# Patient Record
Sex: FEMALE | Race: WHITE | NOT HISPANIC OR LATINO | Employment: OTHER | URBAN - METROPOLITAN AREA
[De-identification: names, ages, dates, MRNs, and addresses within clinical notes are randomized per-mention and may not be internally consistent; named-entity substitution may affect disease eponyms.]

---

## 2017-01-17 ENCOUNTER — ALLSCRIPTS OFFICE VISIT (OUTPATIENT)
Dept: OTHER | Facility: OTHER | Age: 62
End: 2017-01-17

## 2017-01-17 DIAGNOSIS — R73.01 IMPAIRED FASTING GLUCOSE: ICD-10-CM

## 2017-01-17 DIAGNOSIS — E78.5 HYPERLIPIDEMIA: ICD-10-CM

## 2017-01-17 DIAGNOSIS — E03.9 HYPOTHYROIDISM: ICD-10-CM

## 2017-01-17 DIAGNOSIS — R92.8 OTHER ABNORMAL AND INCONCLUSIVE FINDINGS ON DIAGNOSTIC IMAGING OF BREAST: ICD-10-CM

## 2017-01-20 ENCOUNTER — GENERIC CONVERSION - ENCOUNTER (OUTPATIENT)
Dept: OTHER | Facility: OTHER | Age: 62
End: 2017-01-20

## 2017-01-20 LAB
A/G RATIO (HISTORICAL): 2 (ref 1.1–2.5)
ALBUMIN SERPL BCP-MCNC: 4.5 G/DL (ref 3.6–4.8)
ALP SERPL-CCNC: 70 IU/L (ref 39–117)
ALT SERPL W P-5'-P-CCNC: 32 IU/L (ref 0–32)
AMBIG ABBREV CMP14 DEFAULT (HISTORICAL): NORMAL
AMBIG ABBREV LP DEFAULT (HISTORICAL): NORMAL
AST SERPL W P-5'-P-CCNC: 38 IU/L (ref 0–40)
BASOPHILS # BLD AUTO: 0 X10E3/UL (ref 0–0.2)
BASOPHILS # BLD AUTO: 1 %
BILIRUB SERPL-MCNC: 0.5 MG/DL (ref 0–1.2)
BUN SERPL-MCNC: 19 MG/DL (ref 8–27)
BUN/CREA RATIO (HISTORICAL): 23 (ref 11–26)
CALCIUM SERPL-MCNC: 9.6 MG/DL (ref 8.7–10.3)
CHLORIDE SERPL-SCNC: 101 MMOL/L (ref 96–106)
CHOLEST SERPL-MCNC: 174 MG/DL (ref 100–199)
CO2 SERPL-SCNC: 21 MMOL/L (ref 18–29)
CREAT SERPL-MCNC: 0.83 MG/DL (ref 0.57–1)
DEPRECATED RDW RBC AUTO: 13.4 % (ref 12.3–15.4)
EGFR AFRICAN AMERICAN (HISTORICAL): 89 ML/MIN/1.73
EGFR-AMERICAN CALC (HISTORICAL): 77 ML/MIN/1.73
EOSINOPHIL # BLD AUTO: 0.3 X10E3/UL (ref 0–0.4)
EOSINOPHIL # BLD AUTO: 4 %
GLUCOSE SERPL-MCNC: 111 MG/DL (ref 65–99)
HBA1C MFR BLD HPLC: 5.4 % (ref 4.8–5.6)
HCT VFR BLD AUTO: 39.1 % (ref 34–46.6)
HDLC SERPL-MCNC: 67 MG/DL
HGB BLD-MCNC: 13 G/DL (ref 11.1–15.9)
IMM.GRANULOCYTES (CD4/8) (HISTORICAL): 0 %
IMM.GRANULOCYTES (CD4/8) (HISTORICAL): 0 X10E3/UL (ref 0–0.1)
LDLC SERPL CALC-MCNC: 73 MG/DL (ref 0–99)
LYMPHOCYTES # BLD AUTO: 2.2 X10E3/UL (ref 0.7–3.1)
LYMPHOCYTES # BLD AUTO: 35 %
MCH RBC QN AUTO: 31.9 PG (ref 26.6–33)
MCHC RBC AUTO-ENTMCNC: 33.2 G/DL (ref 31.5–35.7)
MCV RBC AUTO: 96 FL (ref 79–97)
MONOCYTES # BLD AUTO: 0.5 X10E3/UL (ref 0.1–0.9)
MONOCYTES (HISTORICAL): 8 %
NEUTROPHILS # BLD AUTO: 3.3 X10E3/UL (ref 1.4–7)
NEUTROPHILS # BLD AUTO: 52 %
PLATELET # BLD AUTO: 168 X10E3/UL (ref 150–379)
POTASSIUM SERPL-SCNC: 4.5 MMOL/L (ref 3.5–5.2)
RBC (HISTORICAL): 4.08 X10E6/UL (ref 3.77–5.28)
SODIUM SERPL-SCNC: 142 MMOL/L (ref 134–144)
TOT. GLOBULIN, SERUM (HISTORICAL): 2.3 G/DL (ref 1.5–4.5)
TOTAL PROTEIN (HISTORICAL): 6.8 G/DL (ref 6–8.5)
TRIGL SERPL-MCNC: 168 MG/DL (ref 0–149)
VLDLC SERPL CALC-MCNC: 34 MG/DL (ref 5–40)
WBC # BLD AUTO: 6.3 X10E3/UL (ref 3.4–10.8)

## 2017-01-21 LAB
T4 FREE SERPL-MCNC: 1.26 NG/DL (ref 0.82–1.77)
TSH SERPL DL<=0.05 MIU/L-ACNC: 1.13 UIU/ML (ref 0.45–4.5)

## 2017-01-25 ENCOUNTER — GENERIC CONVERSION - ENCOUNTER (OUTPATIENT)
Dept: OTHER | Facility: OTHER | Age: 62
End: 2017-01-25

## 2017-02-16 ENCOUNTER — APPOINTMENT (EMERGENCY)
Dept: CT IMAGING | Facility: HOSPITAL | Age: 62
End: 2017-02-16
Payer: COMMERCIAL

## 2017-02-16 ENCOUNTER — HOSPITAL ENCOUNTER (EMERGENCY)
Facility: HOSPITAL | Age: 62
Discharge: HOME | End: 2017-02-16
Attending: EMERGENCY MEDICINE | Admitting: EMERGENCY MEDICINE
Payer: COMMERCIAL

## 2017-02-16 VITALS
OXYGEN SATURATION: 98 % | SYSTOLIC BLOOD PRESSURE: 126 MMHG | WEIGHT: 210.2 LBS | TEMPERATURE: 98.5 F | BODY MASS INDEX: 33.93 KG/M2 | DIASTOLIC BLOOD PRESSURE: 86 MMHG | HEART RATE: 83 BPM | RESPIRATION RATE: 18 BRPM

## 2017-02-16 DIAGNOSIS — S06.0X0A CONCUSSION, WITHOUT LOSS OF CONSCIOUSNESS, INITIAL ENCOUNTER: Primary | ICD-10-CM

## 2017-02-16 LAB
ALBUMIN SERPL BCP-MCNC: 4.5 G/DL (ref 3.5–5)
ALP SERPL-CCNC: 81 U/L (ref 46–116)
ALT SERPL W P-5'-P-CCNC: 41 U/L (ref 12–78)
ANION GAP SERPL CALCULATED.3IONS-SCNC: 11 MMOL/L (ref 4–13)
AST SERPL W P-5'-P-CCNC: 27 U/L (ref 5–45)
BASOPHILS # BLD AUTO: 0.04 THOUSANDS/ΜL (ref 0–0.1)
BASOPHILS NFR BLD AUTO: 1 % (ref 0–1)
BILIRUB SERPL-MCNC: 0.8 MG/DL (ref 0.2–1)
BUN SERPL-MCNC: 13 MG/DL (ref 5–25)
CALCIUM SERPL-MCNC: 9.6 MG/DL (ref 8.3–10.1)
CHLORIDE SERPL-SCNC: 104 MMOL/L (ref 100–108)
CO2 SERPL-SCNC: 26 MMOL/L (ref 21–32)
CREAT SERPL-MCNC: 0.88 MG/DL (ref 0.6–1.3)
EOSINOPHIL # BLD AUTO: 0.12 THOUSAND/ΜL (ref 0–0.61)
EOSINOPHIL NFR BLD AUTO: 1 % (ref 0–6)
ERYTHROCYTE [DISTWIDTH] IN BLOOD BY AUTOMATED COUNT: 12.7 % (ref 11.6–15.1)
GFR SERPL CREATININE-BSD FRML MDRD: >60 ML/MIN/1.73SQ M
GLUCOSE SERPL-MCNC: 116 MG/DL (ref 65–140)
HCT VFR BLD AUTO: 42.4 % (ref 34.8–46.1)
HGB BLD-MCNC: 13.6 G/DL (ref 11.5–15.4)
LYMPHOCYTES # BLD AUTO: 1.7 THOUSANDS/ΜL (ref 0.6–4.47)
LYMPHOCYTES NFR BLD AUTO: 20 % (ref 14–44)
MCH RBC QN AUTO: 31.7 PG (ref 26.8–34.3)
MCHC RBC AUTO-ENTMCNC: 32.1 G/DL (ref 31.4–37.4)
MCV RBC AUTO: 99 FL (ref 82–98)
MONOCYTES # BLD AUTO: 0.68 THOUSAND/ΜL (ref 0.17–1.22)
MONOCYTES NFR BLD AUTO: 8 % (ref 4–12)
NEUTROPHILS # BLD AUTO: 6.11 THOUSANDS/ΜL (ref 1.85–7.62)
NEUTS SEG NFR BLD AUTO: 70 % (ref 43–75)
PLATELET # BLD AUTO: 165 THOUSANDS/UL (ref 149–390)
PMV BLD AUTO: 13.3 FL (ref 8.9–12.7)
POTASSIUM SERPL-SCNC: 4.1 MMOL/L (ref 3.5–5.3)
PROT SERPL-MCNC: 7.9 G/DL (ref 6.4–8.2)
RBC # BLD AUTO: 4.29 MILLION/UL (ref 3.81–5.12)
SODIUM SERPL-SCNC: 141 MMOL/L (ref 136–145)
TROPONIN I SERPL-MCNC: <0.02 NG/ML
WBC # BLD AUTO: 8.65 THOUSAND/UL (ref 4.31–10.16)

## 2017-02-16 PROCEDURE — 36415 COLL VENOUS BLD VENIPUNCTURE: CPT | Performed by: EMERGENCY MEDICINE

## 2017-02-16 PROCEDURE — 80053 COMPREHEN METABOLIC PANEL: CPT | Performed by: EMERGENCY MEDICINE

## 2017-02-16 PROCEDURE — 93005 ELECTROCARDIOGRAM TRACING: CPT | Performed by: EMERGENCY MEDICINE

## 2017-02-16 PROCEDURE — 99284 EMERGENCY DEPT VISIT MOD MDM: CPT

## 2017-02-16 PROCEDURE — 85025 COMPLETE CBC W/AUTO DIFF WBC: CPT | Performed by: EMERGENCY MEDICINE

## 2017-02-16 PROCEDURE — 70450 CT HEAD/BRAIN W/O DYE: CPT

## 2017-02-16 PROCEDURE — 84484 ASSAY OF TROPONIN QUANT: CPT | Performed by: EMERGENCY MEDICINE

## 2017-02-17 LAB
ATRIAL RATE: 83 BPM
P AXIS: 42 DEGREES
PR INTERVAL: 142 MS
QRS AXIS: 2 DEGREES
QRSD INTERVAL: 78 MS
QT INTERVAL: 368 MS
QTC INTERVAL: 432 MS
T WAVE AXIS: 29 DEGREES
VENTRICULAR RATE: 83 BPM

## 2017-07-05 DIAGNOSIS — Z91.018 ALLERGY TO OTHER FOODS: ICD-10-CM

## 2017-07-06 ENCOUNTER — GENERIC CONVERSION - ENCOUNTER (OUTPATIENT)
Dept: OTHER | Facility: OTHER | Age: 62
End: 2017-07-06

## 2017-07-08 LAB
ALLERGEN CLAMS (HISTORICAL): <0.1 KU/L
ALLERGEN CORN (HISTORICAL): <0.1 KU/L
ALLERGEN SCALLOP (F338) IGE (HISTORICAL): <0.1 KU/L
CLASS (HISTORICAL): ABNORMAL
EGG WHITE (HISTORICAL): <0.1 KU/L
FISH COD (HISTORICAL): <0.1 KU/L
MILK, COW'S (HISTORICAL): <0.1 KU/L
PEANUT (HISTORICAL): <0.1 KU/L
SESAME SEED IGE (HISTORICAL): <0.1 KU/L
SHRIMP (HISTORICAL): 0.26 KU/L
SOYBEAN (HISTORICAL): <0.1 KU/L
WALNUT (HISTORICAL): <0.1 KU/L
WHEAT (HISTORICAL): <0.1 KU/L

## 2017-07-11 ENCOUNTER — GENERIC CONVERSION - ENCOUNTER (OUTPATIENT)
Dept: OTHER | Facility: OTHER | Age: 62
End: 2017-07-11

## 2017-09-14 ENCOUNTER — GENERIC CONVERSION - ENCOUNTER (OUTPATIENT)
Dept: OTHER | Facility: OTHER | Age: 62
End: 2017-09-14

## 2017-10-16 ENCOUNTER — GENERIC CONVERSION - ENCOUNTER (OUTPATIENT)
Dept: OTHER | Facility: OTHER | Age: 62
End: 2017-10-16

## 2017-11-27 ENCOUNTER — GENERIC CONVERSION - ENCOUNTER (OUTPATIENT)
Dept: FAMILY MEDICINE CLINIC | Facility: CLINIC | Age: 62
End: 2017-11-27

## 2018-01-10 NOTE — PROGRESS NOTES
Assessment    1  Encounter for preventive health examination (V70 0) (Z00 00)   2  History of squamous cell carcinoma of skin (V10 83) (Z85 828)    Plan  Abnormal mammogram    · MAMMO DIAGNOSTIC RIGHT W 3D & CAD; Status:Active; Requested IIZ:50TXM2121;    · US BREAST RIGHT COMPLETE (ABUS); Status:Hold For - Scheduling; Requested  EAT:86ZIL0672;   Hyperlipidemia, Hypothyroidism, Impaired fasting glucose    · (1) CBC/PLT/DIFF; Status:Active; Requested TSF:[id number];    · (1) COMPREHENSIVE METABOLIC PANEL; Status:Active; Requested [id number];    · (1) HEMOGLOBIN A1C; Status:Active; Requested OOE:50BTL2985;    · (1) LIPID PANEL, FASTING; Status:Active; Requested WAV:59HIY1948;    · (1) TSH WITH FT4 REFLEX; Status:Active; Requested JWB:23BXQ6568;     Discussion/Summary  Currently, she eats an adequate diet and has an adequate exercise regimen  the risks and benefits of cervical cancer screening were discussed Breast cancer screening: mammogram is current  Colorectal cancer screening: colorectal cancer screening is current  Osteoporosis screening: the risks and benefits of osteoporosis screening were discussed and the patient declines bone mineral density testing  Screening lab work includes hemoglobin, glucose, lipid profile and thyroid function testing  The immunizations are up to date  Advice and education were given regarding nutrition, aerobic exercise, weight loss, calcium supplements, vitamin D supplements, cardiovascular risk reduction, sunscreen use and self skin examination  Patient discussion: discussed with the patient  Repeat labs  She is not interested in shingles vaccine I suggested a DEXA scan given recent wrist fracture she declined  Mammogram 2016 a repeat right breast diagnostic mammogram and ultrasound recommended 2017  Regarding her medications she would like to taper off her antidepressants  I suggested a slow taper of Lexapro over the next 3 weeks   Continue with Wellbutrin for now with consideration of stopping this in the summer time  History of Present Illness  HM, Adult Female: The last health maintenance visit was >1 year(s) ago  Social History: Household members include spouse  She is   Work status: occupation: retired   The patient is a former cigarette smoker  She reports rare alcohol use and 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  General Health: The patient's health since the last visit is described as good  She has regular dental visits  She denies vision problems  She denies hearing loss  Immunizations status: up to date  Lifestyle:  She consumes a diverse and healthy diet  She has weight concerns  She exercises regularly  She exercises 2 times per week  Exercise includes aquatic aerobics  She does not use tobacco    Reproductive health: the patient is postmenopausal    Screening: Cervical cancer screening includes a pap smear performed > 1 year ago  Breast cancer screening includes a mammogram performed 09/2016  Colorectal cancer screening includes a colonoscopy performed 07/2016  Metabolic screening includes lipid profile performed 07/2015, glucose screening performed 07/2015, thyroid function test performed 07/2015 and no previous DEXA  Cardiovascular risk factors: obesity  General health risks: previous colon polyps  HPI: 62year-old female here for wellness exam  Active medical problems and past medical history see chart  Current medications reviewed  labs 07/2015 see note  Hyperlipidemia mixed type with high HDL  Lipid profile cholesterol 228  Triglycerides 197  HDL 80    Non   Impaired fasting glucose       Review of Systems    Constitutional: recent weight gain and 15 lb weight from 05/2016  Eyes: no eyesight problems  Cardiovascular: no chest pain, no palpitations and no lower extremity edema  Respiratory: DIONE wears CPAP  , but no cough, no orthopnea, no wheezing, no shortness of breath during exertion and no PND  Gastrointestinal: no abdominal pain, no nausea, no vomiting, no constipation, no diarrhea and no blood in stools  Genitourinary: no dysuria and no incontinence  Musculoskeletal: s/p fracture left distal radius [id number] secondary to fall, but no arthralgias and no myalgias  Integumentary: s/p resection SCC right face  followed by dermatology  , but no rashes and no skin lesions  Neurological: no headache and no dizziness  Psychiatric: depression and history of depression on Lexapro 10 mg daily and Wellbutrin  mg daily  Endocrine: hypothyroidism on Levothyroxine 175 mcg daily  05/2015 TSH 1 420  Active Problems    1  Depression (311) (F32 9)   2  Hyperlipidemia (272 4) (E78 5)   3  Hypothyroidism (244 9) (E03 9)   4  Impaired fasting glucose (790 21) (R73 01)   5  DIONE on CPAP (327 23) (G47 33)    Past Medical History    · History of Closed fracture of distal radius and ulna, left, initial encounter (813 44)  (S52 502A,S52 602A)   · History of Fibromyalgia (729 1) (M79 7)   · History of acute sinusitis (V12 69) (Z87 09)   · History of essential hypertension (V12 59) (Z86 79)   · History of gastroesophageal reflux (GERD) (V12 79) (Z87 19)   · History of squamous cell carcinoma of skin (V10 83) (Z85 828)   · History of urticaria (V13 3) (Z87 2)   · History of Plantar fasciitis (728 71) (M72 2)    Surgical History    · History of Nose Surgery    Family History  Mother    · Family history of Breast Cancer (V16 3)  Father    · Family history of Colon Cancer (V16 0)    Social History    · Former smoker (T66 69) (U24 548)    Current Meds   1  BuPROPion HCl ER (XL) 300 MG Oral Tablet Extended Release 24 Hour; Take 1 tablet   daily; Therapy: 23WSO4659 to (Last Rx:59Uon4101)  Requested for: 49SXB6566 Ordered   2  [facility] Vitamin E CAPS Recorded   3  Daily Value Multivitamin Oral Tablet Recorded   4  Escitalopram Oxalate 10 MG Oral Tablet; Take 1 tablet daily;    Therapy: 03Acu2627 to (Evaluate:67Agq0393) Requested for: 21Nov2016; Last   Rx:21Nov2016 Ordered   5  Fish Oil 1000 MG Oral Capsule Recorded   6  Levothyroxine Sodium 175 MCG Oral Tablet; Take 1 tablet daily; Therapy: 24TIQ2884 to (Last Rx:11Mar2016)  Requested for: 40ACS5763 Ordered   7  Pravastatin Sodium 20 MG Oral Tablet; Take 1 tablet daily; Therapy: 40RTA6880 to (Last Rx:02Mar2016)  Requested for: 02Mar2016 Ordered    Allergies    1  Cefprozil SUSR   2  Erythromycin GEL    Vitals   Recorded: 04CSB9356 02:29PM   Temperature 97 7 F   Heart Rate 62   Respiration 16   Systolic 415   Diastolic 84   Height 5 ft 5 3 in   Weight 216 lb 2 08 oz   BMI Calculated 35 64   BSA Calculated 2 05     Physical Exam    Constitutional   General appearance: No acute distress, well appearing and well nourished  Eyes   Conjunctiva and lids: No swelling, erythema or discharge  Pupils and irises: Equal, round, reactive to light  Ophthalmoscopic examination: Normal fundi and optic discs  Ears, Nose, Mouth, and Throat   Otoscopic examination: Tympanic membranes translucent with normal light reflex  Canals patent without erythema  Oropharynx: Normal with no erythema, edema, exudate or lesions  Neck   Neck: Supple, symmetric, trachea midline, no masses  Thyroid: Normal, no thyromegaly  Pulmonary   Auscultation of lungs: Clear to auscultation  Cardiovascular   Auscultation of heart: Normal rate and rhythm, normal S1 and S2, no murmurs  Heart sounds: no gallop heard  Carotid pulses: 2+ bilaterally  no bruit heard over the right carotid and no bruit heard over the left carotid  Abdominal aorta: Normal   Abdominal aorta: no bruit heard  Examination of extremities for edema and/or varicosities: Normal     Abdomen   Abdomen: Non-tender, no masses  Liver and spleen: No hepatomegaly or splenomegaly  Lymphatic   Palpation of lymph nodes in neck: No lymphadenopathy      Musculoskeletal   Gait and station: Normal     Range of motion: Normal     Skin Skin and subcutaneous tissue: Normal without rashes or lesions  Psychiatric   Judgment and insight: Normal     Recent and remote memory: Intact  Mood and affect: Normal        Results/Data  PHQ-9 Adult Depression Screening 63NUF1816 02:35PM User, Olga     Test Name Result Flag Reference   PHQ-9 Adult Depression Score 0     Over the last two weeks, how often have you been bothered by any of the following problems? Little interest or pleasure in doing things: Not at all - 0  Feeling down, depressed, or hopeless: Not at all - 0  Trouble falling or staying asleep, or sleeping too much: Not at all - 0  Feeling tired or having little energy: Not at all - 0  Poor appetite or over eating: Not at all - 0  Feeling bad about yourself - or that you are a failure or have let yourself or your family down: Not at all - 0  Trouble concentrating on things, such as reading the newspaper or watching television: Not at all - 0  Moving or speaking so slowly that other people could have noticed  Or the opposite -  being so fidgety or restless that you have been moving around a lot more than usual: Not at all - 0  Thoughts that you would be better off dead, or of hurting yourself in some way: Not at all - 0   PHQ-9 Adult Depression Screening Negative     PHQ-9 Difficulty Level Not difficult at all     PHQ-9 Severity No Depression       * MAMMO SCREENING BILATERAL W CAD [id number] 12:00AM Theresa Reyes     Test Name Result Flag Reference   [facility] SCREENING BILATERAL W CAD 09/02/2016       Summary / No summary entered :      No summary entered  Documents attached :      Surinder Aponte;  Enc: [id number] - Image Encounter - Theresa Kiminge - (Family      Medicine) (Result Document)  COLONOSCOPY [id number] 01:37PM      Test Name Result Flag Reference   Colonoscopy 07/14/16       Summary / No summary entered :      No summary entered  Documents attached :      EPIC OP NOTE - EPIC, Provider; Enc: [id number] - Transcription Encounter - EPIC,      Provider - (Family Medicine) (Result Document)  (1) CBC/PLT/DIFF [id number] 08:45AM Jazmín Cvent     Test Name Result Flag Reference   WBC 7 4 x10E3/uL  3 4-10 8   RBC 4 02 x10E6/uL  3 77-5 28   Hemoglobin 12 6 g/dL  11 1-15 9   Hematocrit 39 0 %  34 0-46  6   MCV 97 fL  79-97   MCH 31 3 pg  26 6-33 0   MCHC 32 3 g/dL  31 5-35 7   RDW 14 2 %  12 3-15 4   Platelets 158 B64O4/FU  150-379   Neutrophils 55 %     Lymphs 30 %     Monocytes 11 %     Eos 3 %     Basos 1 %     Neutrophils (Absolute) 4 1 x10E3/uL  1 4-7 0   Lymphs (Absolute) 2 2 x10E3/uL  0 7-3 1   Monocytes(Absolute) 0 8 x10E3/uL  0 1-0 9   Eos (Absolute) 0 2 x10E3/uL  0 0-0 4   Baso (Absolute) 0 1 x10E3/uL  0 0-0 2   Immature Granulocytes 0 %     Immature Grans (Abs) 0 0 x10E3/uL  0 0-0 1     (1) COMPREHENSIVE METABOLIC PANEL [id number] 78:71RS [employer]     Test Name Result Flag Reference   Glucose, Serum 105 mg/dL H 65-99   BUN 21 mg/dL  6-24   Creatinine, Serum 0 55 mg/dL L 0 57-1 00   eGFR If NonAfricn Am 104 mL/min/1 73  >59   eGFR If Africn Am 120 mL/min/1 73  >59   BUN/Creatinine Ratio 38 H 9-23   Sodium, Serum 140 mmol/L  134-144   Potassium, Serum 4 7 mmol/L  3 5-5 2   Chloride, Serum 101 mmol/L     Carbon Dioxide, Total 21 mmol/L  18-29   Calcium, Serum 9 5 mg/dL  8 7-10 2   Protein, Total, Serum 6 9 g/dL  6 0-8 5   Albumin, Serum 4 7 g/dL  3 5-5 5   Globulin, Total 2 2 g/dL  1 5-4 5   A/G Ratio 2 1  1 1-2 5   Bilirubin, Total 0 4 mg/dL  0 0-1 2   Alkaline Phosphatase, S 83 IU/L     AST (SGOT) 26 IU/L  0-40   ALT (SGPT) 27 IU/L  0-32     (LC) Lipid Panel [id number] 08:45AM Jazmín De La Garzareza     Test Name Result Flag Reference   Cholesterol, Total 228 mg/dL H 100-199   Triglycerides 197 mg/dL H 0-149   HDL Cholesterol 80 mg/dL  >39   According to ATP-III Guidelines, HDL-C >59 mg/dL is considered a  negative risk factor for CHD     VLDL Cholesterol Aldair 39 mg/dL  5-40   LDL Cholesterol Calc 109 mg/dL H 0-99 (1) TSH [id number] 08:45AM Linda Cardenas     Test Name Result Flag Reference   TSH 1 420 uIU/mL  0 450-4 500       Signatures   Electronically signed by : JUAN Garcias ; Jan 17 2017  6:25PM EST                       (Author)

## 2018-01-11 NOTE — RESULT NOTES
Message  Megan I have enclosed a copy of your recent labs  abnormals fasting blood sugar 111 normal less than 100  borderline 100 to 125  mildly elevated triglycerides at 168 normal less than 150  watch diet  thyroid studies normal  no changes in Levothyroxine dose  Verified Results  [facility]) TSH+Free T4 [id number] 08:02AM Jessie Durham     Test Name Result Flag Reference   TSH 1 130 uIU/mL  0 450-4 500   T4,Free(Direct) 1 26 ng/dL  0 82-1 77     (1) CBC/PLT/DIFF [id number] 08:02AM Jessie Durham     Test Name Result Flag Reference   WBC 6 3 x10E3/uL  3 4-10 8   RBC 4 08 x10E6/uL  3 77-5 28   Hemoglobin 13 0 g/dL  11 1-15 9   Hematocrit 39 1 %  34 0-46  6   MCV 96 fL  79-97   MCH 31 9 pg  26 6-33 0   MCHC 33 2 g/dL  31 5-35 7   RDW 13 4 %  12 3-15 4   Platelets 694 U75R1/IE  150-379   Neutrophils 52 %     Lymphs 35 %     Monocytes 8 %     Eos 4 %     Basos 1 %     Neutrophils (Absolute) 3 3 x10E3/uL  1 4-7 0   Lymphs (Absolute) 2 2 x10E3/uL  0 7-3 1   Monocytes(Absolute) 0 5 x10E3/uL  0 1-0 9   Eos (Absolute) 0 3 x10E3/uL  0 0-0 4   Baso (Absolute) 0 0 x10E3/uL  0 0-0 2   Immature Granulocytes 0 %     Immature Grans (Abs) 0 0 x10E3/uL  0 0-0 1     (1) COMPREHENSIVE METABOLIC PANEL [id number] 27:16OO Jessie Durham     Test Name Result Flag Reference   Glucose, Serum 111 mg/dL H 65-99   BUN 19 mg/dL  8-27   Creatinine, Serum 0 83 mg/dL  0 57-1 00   eGFR If NonAfricn Am 77 mL/min/1 73  >59   eGFR If Africn Am 89 mL/min/1 73  >59   BUN/Creatinine Ratio 23  11-26   Sodium, Serum 142 mmol/L  134-144   Potassium, Serum 4 5 mmol/L  3 5-5 2   Chloride, Serum 101 mmol/L     Carbon Dioxide, Total 21 mmol/L  18-29   Calcium, Serum 9 6 mg/dL  8 7-10 3   Protein, Total, Serum 6 8 g/dL  6 0-8 5   Albumin, Serum 4 5 g/dL  3 6-4 8   Globulin, Total 2 3 g/dL  1 5-4 5   A/G Ratio 2 0  1 1-2 5   Bilirubin, Total 0 5 mg/dL  0 0-1 2   Alkaline Phosphatase, S 70 IU/L     AST (SGOT) 38 IU/L  0-40   ALT (SGPT) 32 IU/L  0-32 [facility] Lipid Panel [id number] 08:02AM Robbert Lights     Test Name Result Flag Reference   Cholesterol, Total 174 mg/dL  100-199   Triglycerides 168 mg/dL H 0-149   HDL Cholesterol 67 mg/dL  >39   VLDL Cholesterol Aldair 34 mg/dL  5-40   LDL Cholesterol Calc 73 mg/dL  0-99     (1) HEMOGLOBIN A1C [id number] 08:02AM Robbert Lights     Test Name Result Flag Reference   Hemoglobin A1c 5 4 %  4 8-5 6   Pre-diabetes: 5 7 - 6 4           Diabetes: >6 4           Glycemic control for adults with diabetes: <7 0     [place] CMP14 Default [id number] 08:02AM Robbert Lights     Test Name Result Flag Reference   Jack Ren CMP14 Default Comment     A hand-written panel/profile was received from your office  In  accordance with the [facility] Ambiguous Test Code Policy dated July 1055, we have completed your order by using the closest currently  or formerly recognized AMA panel  We have assigned Comprehensive  Metabolic Panel (14), Test Code #373799 to this request   If this  is not the testing you wished to receive on this specimen, please  contact the [address] [place] [address] Client Inquiry/Technical Services Department  to clarify the test order  We appreciate your business  [facility] Jack Ren LP Default [id number] 08:02AM Robbert Lights     Test Name Result Flag Reference   Mamadou Abbrev LP Default Comment     A hand-written panel/profile was received from your office  In  accordance with the [facility] Ambiguous Test Code Policy dated July 3812, we have completed your order by using the closest currently  or formerly recognized AMA panel  We have assigned Lipid Panel,  Test Code #733501 to this request  If this is not the testing you  wished to receive on this specimen, please contact the [address] [place] [address]  Client Inquiry/Technical Services Department to clarify the test  order  We appreciate your business         Results/Data     (LC) TSH+Free T4   TSH: 1 130 uIU/mL Reference Range 0 450-4 500  T4,Free(Direct): 1 26 ng/dL Reference Range 0  82-1 77  (1) CBC/PLT/DIFF   WBC: 6 3 x10E3/uL Reference Range 3 4-10 8  RBC: 4 08 x10E6/uL Reference Range 3 77-5 28  Hemoglobin: 13 0 g/dL Reference Range 11 1-15 9  Hematocrit: 39 1 % Reference Range 34 0-46  6  MCV: 96 fL Reference Range 79-97  MCH: 31 9 pg Reference Range 26 6-33 0  MCHC: 33 2 g/dL Reference Range 31 5-35 7  RDW: 13 4 % Reference Range 12 3-15 4  Platelets: 432 N02I3/ZG Reference Range 150-379  Neutrophils: 52 %  Lymphs: 35 %  Monocytes: 8 %  Eos: 4 %  Basos: 1 %  Neutrophils (Absolute): 3 3 x10E3/uL Reference Range 1 4-7 0  Lymphs (Absolute): 2 2 x10E3/uL Reference Range 0 7-3 1  Monocytes(Absolute): 0 5 x10E3/uL Reference Range 0 1-0 9  Eos (Absolute): 0 3 x10E3/uL Reference Range 0 0-0 4  Baso (Absolute): 0 0 x10E3/uL Reference Range 0 0-0 2  Immature Granulocytes: 0 %  Immature Grans (Abs): 0 0 x10E3/uL Reference Range 0 0-0 1  (1) COMPREHENSIVE METABOLIC PANEL   Glucose, Serum: 111 mg/dL Abnormal High Reference Range 65-99  BUN: 19 mg/dL Reference Range 8-27  Creatinine, Serum: 0 83 mg/dL Reference Range 0 57-1 00  eGFR If NonAfricn Am: 77 mL/min/1 73 Reference Range >59  eGFR If Africn Am: 89 mL/min/1 73 Reference Range >59  BUN/Creatinine Ratio: 23  Reference Range 11-26  Sodium, Serum: 142 mmol/L Reference Range 134-144  Potassium, Serum: 4 5 mmol/L Reference Range 3 5-5 2  Chloride, Serum: 101 mmol/L Reference Range   Carbon Dioxide, Total: 21 mmol/L Reference Range 18-29  Calcium, Serum: 9 6 mg/dL Reference Range 8 7-10 3  Protein, Total, Serum: 6 8 g/dL Reference Range 6 0-8 5  Albumin, Serum: 4 5 g/dL Reference Range 3 6-4 8  Globulin, Total: 2 3 g/dL Reference Range 1 5-4 5  A/G Ratio: 2 0  Reference Range 1 1-2 5  Bilirubin, Total: 0 5 mg/dL Reference Range 0 0-1 2  Alkaline Phosphatase, S: 70 IU/L Reference Range   AST (SGOT): 38 IU/L Reference Range 0-40  ALT (SGPT): 32 IU/L Reference Range 0-32  (LC) Lipid Panel   Cholesterol, Total: 174 mg/dL Reference Range 100-199  Triglycerides: 168 mg/dL Abnormal High Reference Range 0-149  HDL Cholesterol: 67 mg/dL Reference Range >39  VLDL Cholesterol Aldair: 34 mg/dL Reference Range 5-40  LDL Cholesterol Ca mg/dL Reference Range 0-99  (1) HEMOGLOBIN A1C   Hemoglobin A1c: 5 4 % Reference Range 4 8-5 6    Signatures   Electronically signed by : JUAN Goodrich ; 2017  7:13AM EST                       (Author)

## 2018-01-13 VITALS
TEMPERATURE: 97.7 F | BODY MASS INDEX: 36.01 KG/M2 | HEIGHT: 65 IN | SYSTOLIC BLOOD PRESSURE: 126 MMHG | WEIGHT: 216.13 LBS | HEART RATE: 62 BPM | RESPIRATION RATE: 16 BRPM | DIASTOLIC BLOOD PRESSURE: 84 MMHG

## 2018-01-13 NOTE — RESULT NOTES
Discussion/Summary   [facility] food allergy testing normal except positive for shrimp      Verified Results  ([facility]) Allergen Profile, Food [id number] 08:09AM Barbara Metcalf     Test Name Result Flag Reference   Class Description Comment     Levels of Specific IgE       Class  Description of Class      ---------------------------  -----  --------------------                     < 0 10         0         Negative             0 10 -    0 31         0/I       Equivocal/Low             0 32 -    0 55         I         Low             0 56 -    1 40         II        Moderate             1 41 -    3 90         III       High             3 91 -   19 00         IV        Very High            19 01 -  100 00         V         Very High                    >100 00         VI        Very High   F477-ZdB Egg White <0 10 kU/L  Class 0   Z394-TkP Peanut <0 10 kU/L  Class 0   F882-XrP Soybean <0 10 kU/L  Class 0   U787-AlR Milk <0 10 kU/L  Class 0   K096-NxD Clam <0 10 kU/L  Class 0   B779-GkK [place] <0 10 kU/L  Class 0   T422-VjF Codfish <0 10 kU/L  Class 0   M499-DuD Scallop <0 10 kU/L  Class 0   A784-QkW Wheat <0 10 kU/L  Class 0   D741-BtA Corn <0 10 kU/L  Class 0   X254-NhM Sesame Seed <0 10 kU/L  Class 0   J966-HcR Shrimp 0 26 kU/L A Class 0/I

## 2018-01-23 ENCOUNTER — ALLSCRIPTS OFFICE VISIT (OUTPATIENT)
Dept: OTHER | Facility: OTHER | Age: 63
End: 2018-01-23

## 2018-01-23 DIAGNOSIS — G47.33 OBSTRUCTIVE SLEEP APNEA: ICD-10-CM

## 2018-01-23 DIAGNOSIS — R73.01 IMPAIRED FASTING GLUCOSE: ICD-10-CM

## 2018-01-23 DIAGNOSIS — E78.5 HYPERLIPIDEMIA: ICD-10-CM

## 2018-01-23 DIAGNOSIS — Z91.018 ALLERGY TO OTHER FOODS (CODE): ICD-10-CM

## 2018-01-23 DIAGNOSIS — E03.9 HYPOTHYROIDISM: ICD-10-CM

## 2018-01-24 NOTE — PROGRESS NOTES
Assessment    1  Encounter for preventive health examination (V70 0) (Z00 00)    Plan  Food allergy, Hyperlipidemia, Hypothyroidism, Impaired fasting glucose, DIONE on CPAP    · (1) CBC/PLT/DIFF; Status:Active; Requested [id number]:[id number];    · (1) COMPREHENSIVE METABOLIC PANEL; Status:Active; Requested YKP:[id number];    · (1) HEMOGLOBIN A1C; Status:Active; Requested WJT:[id number];    · (1) LIPID PANEL, FASTING; Status:Active; Requested GMA:[id number];    · (1) TSH WITH FT4 REFLEX; Status:Active; Requested QRS:[id number];   Need for shingles vaccine    · Zostavax 88735 UNT/0 65ML Subcutaneous Solution Reconstituted (Zostavax  62773 UNT/0 65ML Subcutaneous Solution Reconstituted); INJECT 0 65 ML Once  PMH: History of urticaria    · MethylPREDNISolone 4 MG Oral Tablet Therapy Pack (Medrol); take as directed    Discussion/Summary  health maintenance visit Currently, she eats a healthy diet and has an adequate exercise regimen  cervical cancer screening is current Breast cancer screening: mammogram is current  Colorectal cancer screening: colorectal cancer screening is current  Osteoporosis screening: the risks and benefits of osteoporosis screening were discussed and the patient declines bone mineral density testing  Screening lab work includes hemoglobin, glucose, lipid profile and thyroid function testing  The risks and benefits of immunizations were discussed  Advice and education were given regarding nutrition, aerobic exercise, weight loss, calcium supplements, vitamin D supplements and cardiovascular risk reduction  Repeat labs  script for shingles vaccine  script for prn Medrol for urticaria  History of Present Illness  HM, Adult Female: The last health maintenance visit was >1 year(s) ago  Social History: Household members include spouse  She is   Work status: occupation: retired   The patient is a former cigarette smoker  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  General Health:  The patient's health since the last visit is described as good  She has regular dental visits  She denies vision problems  She denies hearing loss  Immunizations status: up to date  Lifestyle:  She consumes a diverse and healthy diet  She has weight concerns  She exercises regularly  She exercises 2 times per week  Exercise includes aquatic aerobics  She does not use tobacco    Reproductive health: the patient is postmenopausal    Screening: Cervical cancer screening includes a pap smear performed > 1 year ago  Breast cancer screening includes a mammogram performed 09/2017  Colorectal cancer screening includes a colonoscopy performed 07/2016  Metabolic screening includes lipid profile performed 01/2017, glucose screening performed 01/2017, thyroid function test performed 01/2017 and no previous DEXA  Cardiovascular risk factors: obesity  General health risks: previous colon polyps  HPI: 65year-old female here for wellness exam  Active medical problems and past medical history see chart  Current medications reviewed  labs 01/2017 see note  Hyperlipidemia mixed type with high HDL  on Pravastatin 20 mg daily and fish oils  Lipid profile cholesterol 174  Triglycerides 168  HDL 67  LDL 73  Impaired fasting glucose   A1c 5 4  BPs stable off medication  Review of Systems    Constitutional: no recent weight gain and no recent weight loss  Eyes: no eyesight problems  Cardiovascular: no chest pain, no palpitations and no lower extremity edema  Respiratory: DIONE wears CPAP  , but no cough, no orthopnea, no wheezing, no shortness of breath during exertion and no PND  Gastrointestinal: no abdominal pain, no nausea, no vomiting, no constipation, no diarrhea and no blood in stools  Genitourinary: no dysuria and no incontinence  Musculoskeletal: s/p fracture left distal radius 012101 secondary to fall, but no arthralgias and no myalgias  Integumentary: s/p resection SCC right face   followed by dermatology  chronic hives  food allergy panel 07/2017 + shrimp  no reaction to shrimp , but no rashes and no skin lesions  Neurological: no headache and no dizziness  Psychiatric: depression and history of depression stable on Wellbutrin  mg daily  no longer on Lexapro  Endocrine: hypothyroidism on Levothyroxine 175 mcg daily  01/2017 TSH 1 130  Active Problems    1  Abnormal mammogram (793 80) (R92 8)   2  Depression (311) (F32 9)   3  Food allergy (V15 05) (Z91 018)   4  Hyperlipidemia (272 4) (E78 5)   5  Hypothyroidism (244 9) (E03 9)   6  Impaired fasting glucose (790 21) (R73 01)   7  DIONE on CPAP (327 23,V46 8) (G47 33,Z99 89)    Past Medical History    · History of Closed fracture of distal radius and ulna, left, initial encounter (813 44)  (S52 502A,S52 602A)   · History of Fibromyalgia (729 1) (M79 7)   · History of essential hypertension (V12 59) (Z86 79)   · History of gastroesophageal reflux (GERD) (V12 79) (Z87 19)   · History of squamous cell carcinoma of skin (V10 83) (Z85 828)   · History of urticaria (V13 3) (Z87 2)   · History of Plantar fasciitis (728 71) (M72 2)    Surgical History    · History of Nose Surgery    Family History  Mother    · Family history of Breast Cancer (V16 3)  Father    · Family history of Colon Cancer (V16 0)    Social History    · Former smoker (X78 42) (I99 506)    Current Meds   1  BuPROPion HCl ER (XL) 300 MG Oral Tablet Extended Release 24 Hour; Take 1 tablet   daily; Therapy: 11UAU7567 to (Last Rx:17Oct2017)  Requested for: 17Oct2017 Ordered   2  Calcium 500/D 500-200 MG-UNIT Oral Tablet; Therapy: (Recorded:23Jan2018) to Recorded   3  [facility] Vitamin E CAPS Recorded   4  Daily Value Multivitamin Oral Tablet Recorded   5  Fish Oil 1000 MG Oral Capsule Recorded   6  Levothyroxine Sodium 175 MCG Oral Tablet; Take 1 tablet daily; Therapy: 48CNU5438 to (Last Rx:29Ivn6898)  Requested for: 36PFR6658 Ordered   7  Pravastatin Sodium 20 MG Oral Tablet;  Take 1 tablet daily; Therapy: 30ORJ4316 to (Last Rx:01Vfz9759)  Requested for: 67VBV4876 Ordered    Allergies    1  Cefprozil SUSR   2  Erythromycin GEL    Vitals   Recorded: 13AQP1024 01:07PM   Temperature 98 3 F   Heart Rate 72   Respiration 16   Systolic 002   Diastolic 70   Height 5 ft 5 in   Weight 214 lb    BMI Calculated 35 61   BSA Calculated 2 04     Physical Exam    Constitutional   General appearance: No acute distress, well appearing and well nourished  Eyes   Conjunctiva and lids: No swelling, erythema or discharge  Pupils and irises: Equal, round, reactive to light  Ophthalmoscopic examination: Normal fundi and optic discs  Ears, Nose, Mouth, and Throat   Otoscopic examination: Tympanic membranes translucent with normal light reflex  Canals patent without erythema  Oropharynx: Normal with no erythema, edema, exudate or lesions  Neck   Neck: Supple, symmetric, trachea midline, no masses  Thyroid: Normal, no thyromegaly  There were no thyroid nodules  Pulmonary   Auscultation of lungs: Clear to auscultation  Cardiovascular   Auscultation of heart: Normal rate and rhythm, normal S1 and S2, no murmurs  Heart sounds: no gallop heard  Carotid pulses: 2+ bilaterally  no bruit heard over the right carotid and no bruit heard over the left carotid  Abdominal aorta: Normal   Abdominal aorta: no bruit heard  Examination of extremities for edema and/or varicosities: Normal     Abdomen   Abdomen: Non-tender, no masses  Liver and spleen: No hepatomegaly or splenomegaly  Lymphatic   Palpation of lymph nodes in neck: No lymphadenopathy  no anterior cervical node enlargement, no posterior cervical node enlargement, no submandibular node enlargement and no supraclavicular node enlargement  Musculoskeletal   Gait and station: Normal     Range of motion: Normal     Skin   Skin and subcutaneous tissue: Normal without rashes or lesions      Psychiatric   Mood and affect: Normal  Results/Data  MAMMO DIAGNOSTIC RIGHT W 3D & CAD [id number] 12:00AM Sheron Moran     Test Name Result Flag Reference   [facility] DIAGNOSTIC RIGHT W 3D & CAD 9/14/2017       Summary / No summary entered :      No summary entered  Documents attached :      Radha Zamorano; Enc: [id number] - Image Encounter - Sheron Moran - (Family      Medicine) (Additional Information Document)  [facility]) Allergen Profile, Food [id number] 08:09AM Sheron Moran     Test Name Result Flag Reference   Class Description Comment     Levels of Specific IgE       Class  Description of Class      ---------------------------  -----  --------------------                     < 0 10         0         Negative             0 10 -    0 31         0/I       Equivocal/Low             0 32 -    0 55         I         Low             0 56 -    1 40         II        Moderate             1 41 -    3 90         III       High             3 91 -   19 00         IV        Very High            19 01 -  100 00         V         Very High                    >100 00         VI        Very High   S359-CpJ Egg White <0 10 kU/L  Class 0   F438-NfU Peanut <0 10 kU/L  Class 0   M470-VnS Soybean <0 10 kU/L  Class 0   S629-ItO Milk <0 10 kU/L  Class 0   X472-DaM Clam <0 10 kU/L  Class 0   B883-WoH [place] <0 10 kU/L  Class 0   K483-LiR Codfish <0 10 kU/L  Class 0   D901-PpD Scallop <0 10 kU/L  Class 0   Z132-MhH Wheat <0 10 kU/L  Class 0   U099-GzQ Corn <0 10 kU/L  Class 0   V929-GfE Sesame Seed <0 10 kU/L  Class 0   A821-SeA Shrimp 0 26 kU/L A Class 0/I     US BREAST RIGHT COMPLETE (ABUS) [id number] 01:12PM Sheron Moran     Test Name Result Flag Reference   US BREAST RIGHT COMPLETE 03/03/2017       Summary / No summary entered :      No summary entered  Documents attached :      Dmitry Newberry;  Enc: [id number] - Image Encounter - Sheron Moran -      [facility]) (Result Document)  [facility]) TSH+Free T4 [id number] 08:02AM Sheron Moran     Test Name Result Flag Reference   TSH 1 130 uIU/mL  0 450-4 500   T4,Free(Direct) 1 26 ng/dL  0 82-1 77     (1) CBC/PLT/DIFF 20Jan2017 08:02AM [facility]     Test Name Result Flag Reference   WBC 6 3 x10E3/uL  3 4-10 8   RBC 4 08 x10E6/uL  3 77-5 28   Hemoglobin 13 0 g/dL  11 1-15 9   Hematocrit 39 1 %  34 0-46  6   MCV 96 fL  79-97   MCH 31 9 pg  26 6-33 0   MCHC 33 2 g/dL  31 5-35 7   RDW 13 4 %  12 3-15 4   Platelets 730 W44P3/SR  150-379   Neutrophils 52 %     Lymphs 35 %     Monocytes 8 %     Eos 4 %     Basos 1 %     Neutrophils (Absolute) 3 3 x10E3/uL  1 4-7 0   Lymphs (Absolute) 2 2 x10E3/uL  0 7-3 1   Monocytes(Absolute) 0 5 x10E3/uL  0 1-0 9   Eos (Absolute) 0 3 x10E3/uL  0 0-0 4   Baso (Absolute) 0 0 x10E3/uL  0 0-0 2   Immature Granulocytes 0 %     Immature Grans (Abs) 0 0 x10E3/uL  0 0-0 1     (1) COMPREHENSIVE METABOLIC PANEL [id number] 45:99GK [facility]     Test Name Result Flag Reference   Glucose, Serum 111 mg/dL H 65-99   BUN 19 mg/dL  8-27   Creatinine, Serum 0 83 mg/dL  0 57-1 00   BUN/Creatinine Ratio 23  11-26   Sodium, Serum 142 mmol/L  134-144   Potassium, Serum 4 5 mmol/L  3 5-5 2   Chloride, Serum 101 mmol/L     Carbon Dioxide, Total 21 mmol/L  18-29   Calcium, Serum 9 6 mg/dL  8 7-10 3   Protein, Total, Serum 6 8 g/dL  6 0-8 5   Albumin, Serum 4 5 g/dL  3 6-4 8   Globulin, Total 2 3 g/dL  1 5-4 5   A/G Ratio 2 0  1 1-2 5   Bilirubin, Total 0 5 mg/dL  0 0-1 2   Alkaline Phosphatase, S 70 IU/L     AST (SGOT) 38 IU/L  0-40   ALT (SGPT) 32 IU/L  0-32   eGFR If NonAfricn Am 77 mL/min/1 73  >59   eGFR If Africn Am 89 mL/min/1 73  >59     (LC) Lipid Panel 20Jan2017 08:02AM Rachael Suresh     Test Name Result Flag Reference   Cholesterol, Total 174 mg/dL  100-199   Triglycerides 168 mg/dL H 0-149   HDL Cholesterol 67 mg/dL  >39   VLDL Cholesterol [place] 34 mg/dL  5-40   LDL Cholesterol Calc 73 mg/dL  0-99     (1) HEMOGLOBIN A1C 20Jan2017 08:02AM Rachael Suresh     Test Name Result Flag Reference Hemoglobin A1c 5 4 %  4 8-5 6   Pre-diabetes: 5 7 - 6 4           Diabetes: >6 4           Glycemic control for adults with diabetes: <7 0       Signatures   Electronically signed by : JUAN Pederson ; Jan 23 2018  2:33PM EST                       (Author)

## 2018-01-30 LAB — TSH SERPL DL<=0.005 MIU/L-ACNC: 2.53 UIU/ML (ref 0.45–4.5)

## 2018-03-05 DIAGNOSIS — E78.00 HYPERCHOLESTEREMIA: Primary | ICD-10-CM

## 2018-03-05 RX ORDER — PRAVASTATIN SODIUM 20 MG
20 TABLET ORAL DAILY
Qty: 90 TABLET | Refills: 3 | Status: SHIPPED | OUTPATIENT
Start: 2018-03-05 | End: 2018-03-15 | Stop reason: SDUPTHER

## 2018-03-05 RX ORDER — PRAVASTATIN SODIUM 20 MG
1 TABLET ORAL DAILY
COMMUNITY
Start: 2015-02-23 | End: 2018-03-05 | Stop reason: SDUPTHER

## 2018-03-15 DIAGNOSIS — E78.00 HYPERCHOLESTEREMIA: ICD-10-CM

## 2018-03-16 RX ORDER — PRAVASTATIN SODIUM 20 MG
20 TABLET ORAL DAILY
Qty: 90 TABLET | Refills: 3 | Status: SHIPPED | OUTPATIENT
Start: 2018-03-16 | End: 2020-01-16

## 2018-05-07 ENCOUNTER — OFFICE VISIT (OUTPATIENT)
Dept: FAMILY MEDICINE CLINIC | Facility: CLINIC | Age: 63
End: 2018-05-07
Payer: COMMERCIAL

## 2018-05-07 VITALS
TEMPERATURE: 98.1 F | BODY MASS INDEX: 34.99 KG/M2 | WEIGHT: 210 LBS | HEIGHT: 65 IN | RESPIRATION RATE: 16 BRPM | HEART RATE: 92 BPM | SYSTOLIC BLOOD PRESSURE: 122 MMHG | DIASTOLIC BLOOD PRESSURE: 82 MMHG

## 2018-05-07 DIAGNOSIS — R22.0 TONGUE SWELLING: Primary | ICD-10-CM

## 2018-05-07 PROBLEM — R92.8 ABNORMAL MAMMOGRAM: Status: ACTIVE | Noted: 2017-01-17

## 2018-05-07 PROCEDURE — 99214 OFFICE O/P EST MOD 30 MIN: CPT | Performed by: FAMILY MEDICINE

## 2018-05-07 RX ORDER — EPINEPHRINE 1 MG/ML
0.5 INJECTION, SOLUTION, CONCENTRATE INTRAVENOUS ONCE
Status: SHIPPED | OUTPATIENT
Start: 2018-05-07

## 2018-05-07 RX ORDER — OYSTER SHELL CALCIUM WITH VITAMIN D 500; 200 MG/1; [IU]/1
1 TABLET, FILM COATED ORAL DAILY
COMMUNITY
End: 2019-01-24 | Stop reason: ALTCHOICE

## 2018-05-07 RX ORDER — PREDNISONE 20 MG/1
20 TABLET ORAL DAILY
Qty: 4 TABLET | Refills: 0 | Status: SHIPPED | OUTPATIENT
Start: 2018-05-07 | End: 2018-05-09 | Stop reason: ALTCHOICE

## 2018-05-07 RX ORDER — OMEGA-3 FATTY ACIDS CAP DELAYED RELEASE 1000 MG 1000 MG
1 CAPSULE DELAYED RELEASE ORAL DAILY
COMMUNITY

## 2018-05-07 RX ORDER — FAMOTIDINE 40 MG/1
40 TABLET, FILM COATED ORAL 2 TIMES DAILY PRN
Qty: 30 TABLET | Refills: 1 | Status: SHIPPED | OUTPATIENT
Start: 2018-05-07 | End: 2018-05-09

## 2018-05-07 NOTE — PROGRESS NOTES
FAMILY MEDICINE PROGRESS NOTE  Iman Moore 65 y o  female   DATE: May 7, 2018     ASSESSMENT and PLAN:  Iman Moore is a 65 y o  female with:     1  Tongue swelling  Likely related to ingestion of TicTac, though has had them without incident in the past  Significant left tongue swelling on exam and now complaining of gradually progressive difficulty swallowing  Epinephrine 0 5mg IM given in the office today  Meds as below, can add Benadryl qHS PRN  If symptoms persist, she is to go to the ER emergently  - predniSONE 20 mg tablet; Take 1 tablet (20 mg total) by mouth daily  Dispense: 4 tablet; Refill: 0  - famotidine (PEPCID) 40 MG tablet; Take 1 tablet (40 mg total) by mouth 2 (two) times a day as needed (itching)  Dispense: 30 tablet; Refill: 1    SUBJECTIVE:  Iman Moore is a 65 y o  female who presents today with a chief complaint of Mouth Injury (left tomgue swelling)  This morning, she took 2-3 orange Tic Tacs and then 40 minutes later started with a tongue swelling  She went to the pool and had no problems  Otherwise no other new food ingestion  Took 2 pills of prednisone 4mg  which she has for hives  She has not noticed a difference in her swelling  Is having some difficulty swallowing, but still able to swallow  Did need an EpiPen >10 years ago  Doesn't have an EpiPen at home  Review of Systems   HENT: Positive for trouble swallowing  Negative for congestion and mouth sores  Respiratory: Negative for cough, choking, shortness of breath, wheezing and stridor  Cardiovascular: Negative for chest pain and palpitations  I have reviewed the patient's PMH, Social History, Medication List and Allergies  OBJECTIVE:  /82   Pulse 92   Temp 98 1 °F (36 7 °C)   Resp 16   Ht 5' 5" (1 651 m)   Wt 95 3 kg (210 lb)   BMI 34 95 kg/m²   Physical Exam   Constitutional: She appears well-developed and well-nourished  No distress     HENT:   Mouth/Throat:       Cardiovascular: Normal rate, regular rhythm and normal heart sounds  Pulmonary/Chest: Effort normal and breath sounds normal  No respiratory distress  She has no wheezes  She has no rales  Skin: She is not diaphoretic  Vitals reviewed  Shruthi Gutierrez MD

## 2018-05-09 ENCOUNTER — OFFICE VISIT (OUTPATIENT)
Dept: FAMILY MEDICINE CLINIC | Facility: CLINIC | Age: 63
End: 2018-05-09
Payer: COMMERCIAL

## 2018-05-09 VITALS
HEIGHT: 65 IN | HEART RATE: 78 BPM | BODY MASS INDEX: 34.99 KG/M2 | WEIGHT: 210 LBS | TEMPERATURE: 97.2 F | RESPIRATION RATE: 16 BRPM | SYSTOLIC BLOOD PRESSURE: 110 MMHG | DIASTOLIC BLOOD PRESSURE: 62 MMHG

## 2018-05-09 DIAGNOSIS — Z91.018 FOOD ALLERGY: Primary | ICD-10-CM

## 2018-05-09 DIAGNOSIS — R73.01 IMPAIRED FASTING GLUCOSE: ICD-10-CM

## 2018-05-09 DIAGNOSIS — E78.2 MIXED HYPERLIPIDEMIA: ICD-10-CM

## 2018-05-09 PROCEDURE — 99214 OFFICE O/P EST MOD 30 MIN: CPT | Performed by: FAMILY MEDICINE

## 2018-05-09 PROCEDURE — 1036F TOBACCO NON-USER: CPT | Performed by: FAMILY MEDICINE

## 2018-05-09 RX ORDER — FEXOFENADINE HCL 180 MG/1
180 TABLET ORAL DAILY
Qty: 30 TABLET | Refills: 1 | Status: SHIPPED | OUTPATIENT
Start: 2018-05-09 | End: 2019-01-24 | Stop reason: ALTCHOICE

## 2018-05-09 RX ORDER — EPINEPHRINE 0.3 MG/.3ML
0.3 INJECTION SUBCUTANEOUS ONCE
Qty: 0.3 ML | Refills: 0 | Status: SHIPPED | OUTPATIENT
Start: 2018-05-09 | End: 2019-01-24 | Stop reason: ALTCHOICE

## 2018-05-09 NOTE — ASSESSMENT & PLAN NOTE
Tongue swelling has significantly improved after 1/2 dose epinephrine given in the office 2 days ago and 2 days of prednisone  Patient has a history angioedema, with unknown source  Patient has had a workup including [place] panel and food allergy that showed low probability for shrimp allergy otherwise was normal   Given this is likely histamine mediated will add Allegra 180 milligrams daily  Given Rx for EpiPen to keep on hand  Will check CBC for IgG and C1 esterase inhibitor, and refer to allergist for further testing  Patient to go to ER if she has to use her EpiPen

## 2018-05-09 NOTE — ASSESSMENT & PLAN NOTE
Labs:  Lab Results   Component Value Date    HGBA1C 5 4 01/20/2017     Lab Results   Component Value Date    CHOL 174 01/20/2017    HDL 67 01/20/2017    LDLCALC 73 01/20/2017    TRIG 168 (H) 01/20/2017     -Recheck A1c and FBG

## 2018-05-09 NOTE — PROGRESS NOTES
FAMILY MEDICINE PROGRESS NOTE  Claude Nims 65 y o  female   DATE: May 9, 2018     ASSESSMENT and PLAN:  Claude Nims is a 65 y o  female with:     Hyperlipidemia  Pt had labs ordered in January, but only TSH was actually resulted  Recheck FLP    Impaired fasting glucose  Labs:  Lab Results   Component Value Date    HGBA1C 5 4 01/20/2017     Lab Results   Component Value Date    CHOL 174 01/20/2017    HDL 67 01/20/2017    LDLCALC 73 01/20/2017    TRIG 168 (H) 01/20/2017     -Recheck A1c and FBG    Food allergy   Tongue swelling has significantly improved after 1/2 dose epinephrine given in the office 2 days ago and 2 days of prednisone  Patient has a history angioedema, with unknown source  Patient has had a workup including [place] panel and food allergy that showed low probability for shrimp allergy otherwise was normal   Given this is likely histamine mediated will add Allegra 180 milligrams daily  Given Rx for EpiPen to keep on hand  Will check CBC for IgG and C1 esterase inhibitor, and refer to allergist for further testing  Patient to go to ER if she has to use her EpiPen  SUBJECTIVE:  Claude Nims is a 65 y o  female who presents today with a chief complaint of Follow-up (tongue swelling)  Patient was seen 2 days ago with left-sided tongue swelling,and received Epi in the office at that time  Pt here to discuss her underlying allergies, has seen Allergist and did have allergy tests done in the past  She has a history of angioedema, only once she had hives to the paint where it caused anaphylaxis  2016: Allergy food profile- low reaction to shrimp  2010: Allergist did skin patch testing that was mostly negative      Review of Systems   Constitutional: Negative for fever  HENT: Negative for facial swelling, trouble swallowing and voice change  Respiratory: Negative for cough, choking and shortness of breath        I have reviewed the patient's PMH, Social History, Medication List and Allergies  OBJECTIVE:  /62   Pulse 78   Temp (!) 97 2 °F (36 2 °C)   Resp 16   Ht 5' 5" (1 651 m)   Wt 95 3 kg (210 lb)   BMI 34 95 kg/m²   Physical Exam   Constitutional: She appears well-developed and well-nourished  No distress  HENT:   Mouth/Throat: Oropharynx is clear and moist  No oropharyngeal exudate, posterior oropharyngeal edema, posterior oropharyngeal erythema or tonsillar abscesses  Tongue swelling has resolved entirely   Skin: She is not diaphoretic  Isabel Maravilla MD

## 2018-05-18 LAB
ALBUMIN SERPL-MCNC: 4.8 G/DL (ref 3.6–4.8)
ALBUMIN/GLOB SERPL: 1.8 {RATIO} (ref 1.2–2.2)
ALP SERPL-CCNC: 85 IU/L (ref 39–117)
ALT SERPL-CCNC: 25 IU/L (ref 0–32)
AMBIG ABBREV DEFAULT: NORMAL
AST SERPL-CCNC: 22 IU/L (ref 0–40)
BASOPHILS # BLD AUTO: 0 X10E3/UL (ref 0–0.2)
BASOPHILS NFR BLD AUTO: 1 %
BILIRUB SERPL-MCNC: 0.4 MG/DL (ref 0–1.2)
BUN SERPL-MCNC: 17 MG/DL (ref 8–27)
BUN/CREAT SERPL: 21 (ref 12–28)
C1INH SERPL-MCNC: 39 MG/DL (ref 21–39)
CALCIUM SERPL-MCNC: 9.4 MG/DL (ref 8.7–10.3)
CHLORIDE SERPL-SCNC: 101 MMOL/L (ref 96–106)
CHOLEST SERPL-MCNC: 210 MG/DL (ref 100–199)
CO2 SERPL-SCNC: 24 MMOL/L (ref 18–29)
CREAT SERPL-MCNC: 0.8 MG/DL (ref 0.57–1)
EOSINOPHIL # BLD AUTO: 0.2 X10E3/UL (ref 0–0.4)
EOSINOPHIL NFR BLD AUTO: 3 %
ERYTHROCYTE [DISTWIDTH] IN BLOOD BY AUTOMATED COUNT: 13.9 % (ref 12.3–15.4)
EST. AVERAGE GLUCOSE BLD GHB EST-MCNC: 114 MG/DL
GLOBULIN SER-MCNC: 2.6 G/DL (ref 1.5–4.5)
GLUCOSE SERPL-MCNC: 111 MG/DL (ref 65–99)
HBA1C MFR BLD: 5.6 % (ref 4.8–5.6)
HCT VFR BLD AUTO: 40.7 % (ref 34–46.6)
HDLC SERPL-MCNC: 85 MG/DL
HGB BLD-MCNC: 13.7 G/DL (ref 11.1–15.9)
IMM GRANULOCYTES # BLD: 0 X10E3/UL (ref 0–0.1)
IMM GRANULOCYTES NFR BLD: 1 %
LDLC SERPL CALC-MCNC: 79 MG/DL (ref 0–99)
LYMPHOCYTES # BLD AUTO: 2.2 X10E3/UL (ref 0.7–3.1)
LYMPHOCYTES NFR BLD AUTO: 29 %
MCH RBC QN AUTO: 31.8 PG (ref 26.6–33)
MCHC RBC AUTO-ENTMCNC: 33.7 G/DL (ref 31.5–35.7)
MCV RBC AUTO: 94 FL (ref 79–97)
MONOCYTES # BLD AUTO: 0.7 X10E3/UL (ref 0.1–0.9)
MONOCYTES NFR BLD AUTO: 9 %
NEUTROPHILS # BLD AUTO: 4.4 X10E3/UL (ref 1.4–7)
NEUTROPHILS NFR BLD AUTO: 57 %
PLATELET # BLD AUTO: 171 X10E3/UL (ref 150–379)
POTASSIUM SERPL-SCNC: 4.9 MMOL/L (ref 3.5–5.2)
PROT SERPL-MCNC: 7.4 G/DL (ref 6–8.5)
RBC # BLD AUTO: 4.31 X10E6/UL (ref 3.77–5.28)
SL AMB EGFR AFRICAN AMERICAN: 92 ML/MIN/1.73
SL AMB EGFR NON AFRICAN AMERICAN: 80 ML/MIN/1.73
SODIUM SERPL-SCNC: 144 MMOL/L (ref 134–144)
TRIGL SERPL-MCNC: 228 MG/DL (ref 0–149)
WBC # BLD AUTO: 7.7 X10E3/UL (ref 3.4–10.8)

## 2018-05-24 DIAGNOSIS — F32.A DEPRESSION, UNSPECIFIED DEPRESSION TYPE: Primary | ICD-10-CM

## 2018-05-25 RX ORDER — BUPROPION HYDROCHLORIDE 300 MG/1
TABLET ORAL
Qty: 90 TABLET | Refills: 0 | Status: SHIPPED | OUTPATIENT
Start: 2018-05-25 | End: 2018-08-14 | Stop reason: SDUPTHER

## 2018-08-14 DIAGNOSIS — F32.A DEPRESSION, UNSPECIFIED DEPRESSION TYPE: ICD-10-CM

## 2018-08-14 RX ORDER — BUPROPION HYDROCHLORIDE 300 MG/1
TABLET ORAL
Qty: 90 TABLET | Refills: 1 | Status: SHIPPED | OUTPATIENT
Start: 2018-08-14 | End: 2019-01-24 | Stop reason: SDUPTHER

## 2018-08-17 DIAGNOSIS — E03.9 ACQUIRED HYPOTHYROIDISM: Primary | ICD-10-CM

## 2018-08-20 RX ORDER — LEVOTHYROXINE SODIUM 175 UG/1
TABLET ORAL
Qty: 90 TABLET | Refills: 1 | Status: SHIPPED | OUTPATIENT
Start: 2018-08-20 | End: 2019-01-28 | Stop reason: SDUPTHER

## 2018-09-27 PROCEDURE — 87624 HPV HI-RISK TYP POOLED RSLT: CPT | Performed by: OBSTETRICS & GYNECOLOGY

## 2018-09-27 PROCEDURE — G0145 SCR C/V CYTO,THINLAYER,RESCR: HCPCS | Performed by: OBSTETRICS & GYNECOLOGY

## 2018-09-28 ENCOUNTER — LAB REQUISITION (OUTPATIENT)
Dept: LAB | Facility: HOSPITAL | Age: 63
End: 2018-09-28
Payer: COMMERCIAL

## 2018-09-28 DIAGNOSIS — Z01.419 ENCOUNTER FOR GYNECOLOGICAL EXAMINATION WITHOUT ABNORMAL FINDING: ICD-10-CM

## 2018-09-28 DIAGNOSIS — Z11.51 ENCOUNTER FOR SCREENING FOR HUMAN PAPILLOMAVIRUS (HPV): ICD-10-CM

## 2018-10-03 LAB
HPV HR 12 DNA CVX QL NAA+PROBE: NEGATIVE
HPV16 DNA CVX QL NAA+PROBE: NEGATIVE
HPV18 DNA CVX QL NAA+PROBE: NEGATIVE
LAB AP GYN PRIMARY INTERPRETATION: NORMAL
Lab: NORMAL

## 2018-10-23 ENCOUNTER — IMMUNIZATION (OUTPATIENT)
Dept: FAMILY MEDICINE CLINIC | Facility: CLINIC | Age: 63
End: 2018-10-23
Payer: COMMERCIAL

## 2018-10-23 DIAGNOSIS — Z23 NEED FOR INFLUENZA VACCINATION: Primary | ICD-10-CM

## 2018-10-23 PROCEDURE — 90686 IIV4 VACC NO PRSV 0.5 ML IM: CPT

## 2018-10-23 PROCEDURE — 90471 IMMUNIZATION ADMIN: CPT

## 2019-01-24 ENCOUNTER — OFFICE VISIT (OUTPATIENT)
Dept: FAMILY MEDICINE CLINIC | Facility: CLINIC | Age: 64
End: 2019-01-24
Payer: COMMERCIAL

## 2019-01-24 VITALS
DIASTOLIC BLOOD PRESSURE: 73 MMHG | HEIGHT: 65 IN | SYSTOLIC BLOOD PRESSURE: 114 MMHG | RESPIRATION RATE: 18 BRPM | HEART RATE: 96 BPM | BODY MASS INDEX: 36.49 KG/M2 | WEIGHT: 219 LBS | TEMPERATURE: 98.9 F

## 2019-01-24 DIAGNOSIS — R29.890 LOSS OF HEIGHT: ICD-10-CM

## 2019-01-24 DIAGNOSIS — Z23 NEED FOR SHINGLES VACCINE: ICD-10-CM

## 2019-01-24 DIAGNOSIS — F33.42 RECURRENT MAJOR DEPRESSIVE DISORDER, IN FULL REMISSION (HCC): ICD-10-CM

## 2019-01-24 DIAGNOSIS — E78.2 MIXED HYPERLIPIDEMIA: ICD-10-CM

## 2019-01-24 DIAGNOSIS — Z99.89 OSA ON CPAP: ICD-10-CM

## 2019-01-24 DIAGNOSIS — E66.9 OBESITY (BMI 30-39.9): ICD-10-CM

## 2019-01-24 DIAGNOSIS — R73.01 IMPAIRED FASTING GLUCOSE: ICD-10-CM

## 2019-01-24 DIAGNOSIS — Z91.018 FOOD ALLERGY: ICD-10-CM

## 2019-01-24 DIAGNOSIS — L50.8 CHRONIC URTICARIA: ICD-10-CM

## 2019-01-24 DIAGNOSIS — E03.9 ACQUIRED HYPOTHYROIDISM: ICD-10-CM

## 2019-01-24 DIAGNOSIS — G47.33 OSA ON CPAP: ICD-10-CM

## 2019-01-24 DIAGNOSIS — Z00.00 WELL ADULT EXAM: Primary | ICD-10-CM

## 2019-01-24 PROCEDURE — 99396 PREV VISIT EST AGE 40-64: CPT | Performed by: FAMILY MEDICINE

## 2019-01-24 RX ORDER — BUPROPION HYDROCHLORIDE 300 MG/1
300 TABLET ORAL DAILY
Qty: 90 TABLET | Refills: 3 | Status: SHIPPED | OUTPATIENT
Start: 2019-01-24 | End: 2020-01-16

## 2019-01-24 RX ORDER — RANITIDINE 150 MG/1
150 TABLET ORAL
Qty: 90 TABLET | Refills: 3 | Status: SHIPPED | OUTPATIENT
Start: 2019-01-24 | End: 2019-09-19 | Stop reason: ALTCHOICE

## 2019-01-24 NOTE — PROGRESS NOTES
Assessment/Plan:         Diagnoses and all orders for this visit:    Well adult exam    Recurrent major depressive disorder, in full remission ([facility] [address] )  -     buPROPion (WELLBUTRIN XL) 300 mg 24 hr tablet; Take 1 tablet (300 mg total) by mouth daily    Chronic urticaria  -     ranitidine (ZANTAC) 150 mg tablet; Take 1 tablet (150 mg total) by mouth daily at bedtime for 90 days  -     CBC and differential  -     Comprehensive metabolic panel    Impaired fasting glucose  -     Hemoglobin A1C    Acquired hypothyroidism    DIONE on CPAP  -     TSH, 3rd generation with Free T4 reflex    Obesity (BMI 30-39  9)    Mixed hyperlipidemia  -     Lipid panel    Food allergy    Loss of height  -     DXA bone density spine hip and pelvis; Future    Need for shingles vaccine  -     Zoster Vac Recomb Adjuvanted 50 MCG/0 5ML SUSR; Inject 50 mcg into a muscle once for 1 dose        Repeat labs  Up to date with flu vaccine  Script for Shingrix  DEXA scan for loss of height and left distal radial fracture  Re chronic hives start Zyrtec 10 mg in AM  Zantac 150 mg at HS  BMI Counseling: Body mass index is 36 22 kg/m²  Discussed the patient's BMI with her  The BMI is above average  BMI counseling and education was provided to the patient  Nutrition recommendations include reducing portion sizes, decreasing overall calorie intake, consuming healthier snacks, moderation in carbohydrate intake, reducing intake of saturated fat and trans fat and reducing intake of cholesterol  Patient ID: Ray Snider is a 59 y o  female  59year-old female here for wellness exam  Active medical problems and past medical history see chart  Current medications reviewed  Hyperlipidemia mixed type with high HDL  on Pravastatin 20 mg daily and fish oils  Last lipid profile 05/2018  cholesterol 210  Triglycerides 228  HDL 85  LDL 79  Impaired fasting glucose   A1c 5 6  Non smoker  Exercises several times a week   Teaches aquatic exercise program at the   The following portions of the patient's history were reviewed and updated as appropriate: allergies, current medications, past family history, past medical history, past social history, past surgical history and problem list     Review of Systems   Constitutional: Negative for appetite change, chills, fatigue, fever and unexpected weight change  HENT: Negative for congestion, ear pain, hearing loss, rhinorrhea, sore throat and trouble swallowing  Eyes: Negative for visual disturbance  Respiratory: Negative for cough, shortness of breath and wheezing  Cardiovascular: Negative for chest pain, palpitations and leg swelling  Gastrointestinal: Negative for abdominal pain, blood in stool, constipation, diarrhea, nausea and vomiting  Colonoscopy 07/2016   Endocrine: Negative for polydipsia and polyuria  Hypothyroidism on Levothyroxine 175 mcg daily  01/2018 TSH 2 530      Genitourinary: Negative for difficulty urinating  GYN and pap smear 10/2018  Mammogram 09/2018   Musculoskeletal: Negative for arthralgias and myalgias  S/p fracture left distal radius 02/2016 secondary to fall,   Skin: Negative for rash  s/p resection SCC right face  followed by dermatology  chronic hives with prior episodes of swelling of lips and tongue  prior allergist evaluation  food allergy panel 07/2017 + shrimp  no reaction to shrimp  No improvement with Allegra  She has an Epi Pen  Allergic/Immunologic: Positive for food allergies  Neurological: Negative for dizziness and headaches  Hematological: Negative for adenopathy  Does not bruise/bleed easily  Psychiatric/Behavioral: Positive for dysphoric mood  Negative for sleep disturbance  Long standing history of depression stable on Wellbutrin  mg daily  no longer on Lexapro            Objective:      /73   Pulse 96   Temp 98 9 °F (37 2 °C)   Resp 18   Ht 5' 5 2" (1 656 m)   Wt 99 3 kg (219 lb)   BMI 36 22 kg/m²          Physical Exam   Constitutional: She is oriented to person, place, and time  She appears well-developed and well-nourished  No distress  HENT:   Mouth/Throat: Oropharynx is clear and moist and mucous membranes are normal  No oral lesions  Normal dentition  Eyes: Pupils are equal, round, and reactive to light  Conjunctivae and EOM are normal  No scleral icterus  Neck: No JVD present  Carotid bruit is not present  No tracheal deviation present  No thyroid mass and no thyromegaly present  Cardiovascular: Normal rate, regular rhythm and normal heart sounds  Exam reveals no gallop  No murmur heard  Pulmonary/Chest: Effort normal and breath sounds normal  No respiratory distress  She has no wheezes  She has no rales  Abdominal: Soft  Bowel sounds are normal  She exhibits no distension and no mass  There is no tenderness  There is no rebound and no guarding  Musculoskeletal: Normal range of motion  She exhibits no edema  Lymphadenopathy:     She has no cervical adenopathy  Neurological: She is alert and oriented to person, place, and time  No cranial nerve deficit  Skin: No rash noted  Psychiatric: She has a normal mood and affect  Nursing note and vitals reviewed          Recent Results (from the past 6384 hour(s))   CBC and differential    Collection Time: 05/14/18  7:15 AM   Result Value Ref Range    White Blood Cell Count 7 7 3 4 - 10 8 x10E3/uL    Red Blood Cell Count 4 31 3 77 - 5 28 x10E6/uL    Hemoglobin 13 7 11 1 - 15 9 g/dL    HCT 40 7 34 0 - 46 6 %    MCV 94 79 - 97 fL    MCH 31 8 26 6 - 33 0 pg    MCHC 33 7 31 5 - 35 7 g/dL    RDW 13 9 12 3 - 15 4 %    Platelet Count 467 356 - 379 x10E3/uL    Neutrophils 57 Not Estab  %    Lymphocytes 29 Not Estab  %    Monocytes 9 Not Estab  %    Eosinophils 3 Not Estab  %    Basophils PCT 1 Not Estab  %    Neutrophils (Absolute) 4 4 1 4 - 7 0 x10E3/uL    Lymphocytes (Absolute) 2 2 0 7 - 3 1 x10E3/uL    Monocytes (Absolute) 0 7 0 1 - 0 9 x10E3/uL    Eosinophils (Absolute) 0 2 0 0 - 0 4 x10E3/uL    Basophils ABS 0 0 0 0 - 0 2 x10E3/uL    Immature Granulocytes 1 Not Estab  %    Immature Granulocytes (Absolute) 0 0 0 0 - 0 1 x10E3/uL   Comprehensive metabolic panel    Collection Time: 05/14/18  7:15 AM   Result Value Ref Range    Glucose, Random 111 (H) 65 - 99 mg/dL    BUN 17 8 - 27 mg/dL    Creatinine 0 80 0 57 - 1 00 mg/dL    eGFR Non African American 80 >59 mL/min/1 73    SL AMB EGFR AFRICAN AMERICAN 92 >59 mL/min/1 73    SL AMB BUN/CREATININE RATIO 21 12 - 28    Sodium 144 134 - 144 mmol/L    Potassium 4 9 3 5 - 5 2 mmol/L    Chloride 101 96 - 106 mmol/L    CO2 24 18 - 29 mmol/L    CALCIUM 9 4 8 7 - 10 3 mg/dL    SL AMB PROTEIN, TOTAL 7 4 6 0 - 8 5 g/dL    Albumin 4 8 3 6 - 4 8 g/dL    Globulin, Total 2 6 1 5 - 4 5 g/dL    Albumin/Globulin Ratio 1 8 1 2 - 2 2    TOTAL BILIRUBIN 0 4 0 0 - 1 2 mg/dL    Alk Phos Isoenzymes 85 39 - 117 IU/L    SL AMB AST 22 0 - 40 IU/L    SL AMB ALT 25 0 - 32 IU/L   Lipid panel    Collection Time: 05/14/18  7:15 AM   Result Value Ref Range    Cholesterol, Total 210 (H) 100 - 199 mg/dL    Triglycerides 228 (H) 0 - 149 mg/dL    HDL 85 >39 mg/dL    LDL Direct 79 0 - 99 mg/dL   HEMOGLOBIN A1C W/ EAG ESTIMATION    Collection Time: 05/14/18  7:15 AM   Result Value Ref Range    Hemoglobin A1C 5 6 4 8 - 5 6 %    Estimated Average Glucose 114 mg/dL   C1 esterase inhibitor    Collection Time: 05/14/18  7:15 AM   Result Value Ref Range    C1 Esterase Inhibitor 39 21 - 39 mg/dL   Brayden Rahman Default    Collection Time: 05/14/18  7:15 AM   Result Value Ref Range    SAMSONIG DARV DEFAULT Comment    Liquid-based pap, screening    Collection Time: 09/27/18  2:49 PM   Result Value Ref Range    Case Report       Gynecologic Cytology Report                       Case: [id number]                                  Authorizing Provider:  Melvin Patel MD            Collected:           09/27/2018 8626 First Screen:          Caitlin Duarte CT       Received:            10/01/2018 1448              Specimen:    LIQUID-BASED PAP, SCREENING, Cervix, Endocervical                                          Primary Interpretation Negative for intraepithelial lesion or malignancy     Specimen Adequacy       Satisfactory for evaluation  Endocervical/transformation zone component present  Additional Information       [employer] FDA approved ,  and ThinPrep Imaging System are utilized with strict adherence to the 's instruction manual to prepare gynecologic and non-gynecologic cytology specimens for the production of ThinPrep slides as well as for gynecologic ThinPrep imaging  These processes have been validated by our laboratory and/or by the   The Pap test is not a diagnostic procedure and should not be used as the sole means to detect cervical cancer  It is only a screening procedure to aid in the detection of cervical cancer and its precursors  Both false-negative and false-positive results have been experienced  Your patient's test result should be interpreted in this context together with the history and clinical findings        LMP     HPV High Risk    Collection Time: 09/27/18  2:49 PM   Result Value Ref Range    HPV Other HR Negative Negative    HPV16 Negative Negative    HPV18 Negative Negative

## 2019-01-28 DIAGNOSIS — E03.9 ACQUIRED HYPOTHYROIDISM: ICD-10-CM

## 2019-01-29 RX ORDER — LEVOTHYROXINE SODIUM 175 UG/1
TABLET ORAL
Qty: 90 TABLET | Refills: 3 | Status: SHIPPED | OUTPATIENT
Start: 2019-01-29 | End: 2019-02-05 | Stop reason: DRUGHIGH

## 2019-01-31 LAB
ALBUMIN SERPL-MCNC: 4.8 G/DL (ref 3.6–4.8)
ALBUMIN/GLOB SERPL: 2.1 {RATIO} (ref 1.2–2.2)
ALP SERPL-CCNC: 85 IU/L (ref 39–117)
ALT SERPL-CCNC: 25 IU/L (ref 0–32)
AST SERPL-CCNC: 21 IU/L (ref 0–40)
BASOPHILS # BLD AUTO: 0 X10E3/UL (ref 0–0.2)
BASOPHILS NFR BLD AUTO: 1 %
BILIRUB SERPL-MCNC: 0.8 MG/DL (ref 0–1.2)
BUN SERPL-MCNC: 17 MG/DL (ref 8–27)
BUN/CREAT SERPL: 21 (ref 12–28)
CALCIUM SERPL-MCNC: 9.6 MG/DL (ref 8.7–10.3)
CHLORIDE SERPL-SCNC: 99 MMOL/L (ref 96–106)
CHOLEST SERPL-MCNC: 210 MG/DL (ref 100–199)
CO2 SERPL-SCNC: 22 MMOL/L (ref 20–29)
CREAT SERPL-MCNC: 0.8 MG/DL (ref 0.57–1)
EOSINOPHIL # BLD AUTO: 0.2 X10E3/UL (ref 0–0.4)
EOSINOPHIL NFR BLD AUTO: 3 %
ERYTHROCYTE [DISTWIDTH] IN BLOOD BY AUTOMATED COUNT: 13.3 % (ref 12.3–15.4)
GLOBULIN SER-MCNC: 2.3 G/DL (ref 1.5–4.5)
GLUCOSE SERPL-MCNC: 106 MG/DL (ref 65–99)
HBA1C MFR BLD: 5.4 % (ref 4.8–5.6)
HCT VFR BLD AUTO: 41.6 % (ref 34–46.6)
HDLC SERPL-MCNC: 66 MG/DL
HGB BLD-MCNC: 13.6 G/DL (ref 11.1–15.9)
IMM GRANULOCYTES # BLD: 0 X10E3/UL (ref 0–0.1)
IMM GRANULOCYTES NFR BLD: 0 %
LABCORP COMMENT: NORMAL
LDLC SERPL CALC-MCNC: 78 MG/DL (ref 0–99)
LYMPHOCYTES # BLD AUTO: 1.8 X10E3/UL (ref 0.7–3.1)
LYMPHOCYTES NFR BLD AUTO: 28 %
MCH RBC QN AUTO: 32.2 PG (ref 26.6–33)
MCHC RBC AUTO-ENTMCNC: 32.7 G/DL (ref 31.5–35.7)
MCV RBC AUTO: 99 FL (ref 79–97)
MONOCYTES # BLD AUTO: 0.5 X10E3/UL (ref 0.1–0.9)
MONOCYTES NFR BLD AUTO: 7 %
NEUTROPHILS # BLD AUTO: 4.1 X10E3/UL (ref 1.4–7)
NEUTROPHILS NFR BLD AUTO: 61 %
PLATELET # BLD AUTO: 177 X10E3/UL (ref 150–379)
POTASSIUM SERPL-SCNC: 4.4 MMOL/L (ref 3.5–5.2)
PROT SERPL-MCNC: 7.1 G/DL (ref 6–8.5)
RBC # BLD AUTO: 4.22 X10E6/UL (ref 3.77–5.28)
SL AMB EGFR AFRICAN AMERICAN: 91 ML/MIN/1.73
SL AMB EGFR NON AFRICAN AMERICAN: 79 ML/MIN/1.73
SL AMB T4, FREE (DIRECT): 1.15 NG/DL (ref 0.82–1.77)
SL AMB VLDL CHOLESTEROL CALC: 66 MG/DL (ref 5–40)
SODIUM SERPL-SCNC: 138 MMOL/L (ref 134–144)
TRIGL SERPL-MCNC: 331 MG/DL (ref 0–149)
TSH SERPL DL<=0.005 MIU/L-ACNC: 6.48 UIU/ML (ref 0.45–4.5)
WBC # BLD AUTO: 6.6 X10E3/UL (ref 3.4–10.8)

## 2019-02-03 ENCOUNTER — TELEPHONE (OUTPATIENT)
Dept: FAMILY MEDICINE CLINIC | Facility: CLINIC | Age: 64
End: 2019-02-03

## 2019-02-03 NOTE — TELEPHONE ENCOUNTER
Call re las  Cholesterol 210 normal less 200  Triglycerides 331 normal less 150  This increased from 228   < 100 normal  watch diet  Increase fish oil capsules to 2/day  Repeat FBS and lipid profile in 6 months  TSH or thyroid test shows thyroid mildly underactive  Dose of Levothyroxine can be increased to 200 mcg daily  If agreeable let me know       Recent Results (from the past 336 hour(s))   CBC and differential    Collection Time: 01/28/19  9:02 AM   Result Value Ref Range    White Blood Cell Count 6 6 3 4 - 10 8 x10E3/uL    Red Blood Cell Count 4 22 3 77 - 5 28 x10E6/uL    Hemoglobin 13 6 11 1 - 15 9 g/dL    HCT 41 6 34 0 - 46 6 %    MCV 99 (H) 79 - 97 fL    MCH 32 2 26 6 - 33 0 pg    MCHC 32 7 31 5 - 35 7 g/dL    RDW 13 3 12 3 - 15 4 %    Platelet Count 899 943 - 379 x10E3/uL    Neutrophils 61 Not Estab  %    Lymphocytes 28 Not Estab  %    Monocytes 7 Not Estab  %    Eosinophils 3 Not Estab  %    Basophils PCT 1 Not Estab  %    Neutrophils (Absolute) 4 1 1 4 - 7 0 x10E3/uL    Lymphocytes (Absolute) 1 8 0 7 - 3 1 x10E3/uL    Monocytes (Absolute) 0 5 0 1 - 0 9 x10E3/uL    Eosinophils (Absolute) 0 2 0 0 - 0 4 x10E3/uL    Basophils ABS 0 0 0 0 - 0 2 x10E3/uL    Immature Granulocytes 0 Not Estab  %    Immature Granulocytes (Absolute) 0 0 0 0 - 0 1 x10E3/uL   Comprehensive metabolic panel    Collection Time: 01/28/19  9:02 AM   Result Value Ref Range    Glucose, Random 106 (H) 65 - 99 mg/dL    BUN 17 8 - 27 mg/dL    Creatinine 0 80 0 57 - 1 00 mg/dL    eGFR Non  79 >59 mL/min/1 73    eGFR  91 >59 mL/min/1 73    SL AMB BUN/CREATININE RATIO 21 12 - 28    Sodium 138 134 - 144 mmol/L    Potassium 4 4 3 5 - 5 2 mmol/L    Chloride 99 96 - 106 mmol/L    CO2 22 20 - 29 mmol/L    CALCIUM 9 6 8 7 - 10 3 mg/dL    Protein, Total 7 1 6 0 - 8 5 g/dL    Albumin 4 8 3 6 - 4 8 g/dL    Globulin, Total 2 3 1 5 - 4 5 g/dL    Albumin/Globulin Ratio 2 1 1 2 - 2 2    TOTAL BILIRUBIN 0 8 0 0 - 1 2 mg/dL    Alk Phos Isoenzymes 85 39 - 117 IU/L    AST 21 0 - 40 IU/L    ALT 25 0 - 32 IU/L   Lipid panel    Collection Time: 01/28/19  9:02 AM   Result Value Ref Range    Cholesterol, Total 210 (H) 100 - 199 mg/dL    Triglycerides 331 (H) 0 - 149 mg/dL    HDL 66 >39 mg/dL    VLDL Cholesterol Calculated 66 (H) 5 - 40 mg/dL    LDL Direct 78 0 - 99 mg/dL   Hemoglobin A1c (w/out EAG)    Collection Time: 01/28/19  9:02 AM   Result Value Ref Range    Hemoglobin A1C 5 4 4 8 - 5 6 %   TSH WITH REFLEX TO FREE T4    Collection Time: 01/28/19  9:02 AM   Result Value Ref Range    TSH 6 480 (H) 0 450 - 4 500 uIU/mL   T4, free    Collection Time: 01/28/19  9:02 AM   Result Value Ref Range    T4,Free (Direct) 1 15 0 82 - 1 77 ng/dL   Specimen Status Report    Collection Time: 01/28/19  9:02 AM   Result Value Ref Range    Comment Comment

## 2019-02-04 NOTE — TELEPHONE ENCOUNTER
Patient called back and was notified of results  She is agreeable to the increase of her LEVOTHYROXINE  MCG  Please call into her [employer] [facility] mailorder  She also wants to know if her PRAVASTATIN 20 MG is staying the same?  Please advise     [phone]

## 2019-02-05 DIAGNOSIS — E03.9 ACQUIRED HYPOTHYROIDISM: ICD-10-CM

## 2019-02-05 DIAGNOSIS — E78.2 MIXED HYPERLIPIDEMIA: Primary | ICD-10-CM

## 2019-02-05 RX ORDER — LEVOTHYROXINE SODIUM 0.2 MG/1
200 TABLET ORAL
Qty: 90 TABLET | Refills: 3 | Status: SHIPPED | OUTPATIENT
Start: 2019-02-05 | End: 2019-12-15 | Stop reason: SDUPTHER

## 2019-02-05 NOTE — TELEPHONE ENCOUNTER
Call new script for Levothyroxine 200 mcg daily sent in  Repeat TSH in 6 weeks  I would not change Pravastatin at this time  Correcting thyroid issue may bring cholesterol down  I would repeat a lipid profile in 6 months

## 2019-02-15 DIAGNOSIS — R29.890 LOSS OF HEIGHT: ICD-10-CM

## 2019-02-24 DIAGNOSIS — E78.00 HYPERCHOLESTEREMIA: ICD-10-CM

## 2019-02-25 RX ORDER — PRAVASTATIN SODIUM 20 MG
TABLET ORAL
Qty: 90 TABLET | Refills: 3 | Status: SHIPPED | OUTPATIENT
Start: 2019-02-25 | End: 2019-09-19 | Stop reason: ALTCHOICE

## 2019-08-16 ENCOUNTER — TELEPHONE (OUTPATIENT)
Dept: FAMILY MEDICINE CLINIC | Facility: CLINIC | Age: 64
End: 2019-08-16

## 2019-08-16 DIAGNOSIS — L50.9 HIVES: Primary | ICD-10-CM

## 2019-08-16 RX ORDER — PREDNISONE 20 MG/1
20 TABLET ORAL 2 TIMES DAILY WITH MEALS
Qty: 10 TABLET | Refills: 0 | Status: SHIPPED | OUTPATIENT
Start: 2019-08-16 | End: 2021-02-08 | Stop reason: SDUPTHER

## 2019-08-16 NOTE — TELEPHONE ENCOUNTER
Patient called stating she was prescribed prednisone from her allergist for hives  She stated she is having a break out and would like to know if this can be prescribed to her  Please advise patient would like a return call   She stated the last time she was prescribed Prednisone 20 mg

## 2019-09-16 ENCOUNTER — IMMUNIZATIONS (OUTPATIENT)
Dept: FAMILY MEDICINE CLINIC | Facility: CLINIC | Age: 64
End: 2019-09-16
Payer: COMMERCIAL

## 2019-09-16 DIAGNOSIS — Z23 NEED FOR INFLUENZA VACCINATION: Primary | ICD-10-CM

## 2019-09-16 PROCEDURE — 90471 IMMUNIZATION ADMIN: CPT

## 2019-09-16 PROCEDURE — 90682 RIV4 VACC RECOMBINANT DNA IM: CPT

## 2019-09-19 ENCOUNTER — OFFICE VISIT (OUTPATIENT)
Dept: FAMILY MEDICINE CLINIC | Facility: CLINIC | Age: 64
End: 2019-09-19
Payer: COMMERCIAL

## 2019-09-19 VITALS
HEIGHT: 62 IN | DIASTOLIC BLOOD PRESSURE: 74 MMHG | SYSTOLIC BLOOD PRESSURE: 118 MMHG | HEART RATE: 78 BPM | TEMPERATURE: 97.3 F | RESPIRATION RATE: 16 BRPM | BODY MASS INDEX: 39.56 KG/M2 | WEIGHT: 215 LBS

## 2019-09-19 DIAGNOSIS — E03.9 ACQUIRED HYPOTHYROIDISM: ICD-10-CM

## 2019-09-19 DIAGNOSIS — R73.01 IMPAIRED FASTING GLUCOSE: ICD-10-CM

## 2019-09-19 DIAGNOSIS — Z11.59 NEED FOR HEPATITIS C SCREENING TEST: ICD-10-CM

## 2019-09-19 DIAGNOSIS — E78.2 MIXED HYPERLIPIDEMIA: ICD-10-CM

## 2019-09-19 DIAGNOSIS — F33.0 MILD EPISODE OF RECURRENT MAJOR DEPRESSIVE DISORDER (HCC): Primary | ICD-10-CM

## 2019-09-19 PROCEDURE — 3008F BODY MASS INDEX DOCD: CPT | Performed by: FAMILY MEDICINE

## 2019-09-19 PROCEDURE — 99213 OFFICE O/P EST LOW 20 MIN: CPT | Performed by: FAMILY MEDICINE

## 2019-09-19 RX ORDER — FLUOXETINE 10 MG/1
10 CAPSULE ORAL DAILY
Qty: 30 CAPSULE | Refills: 1 | Status: SHIPPED | OUTPATIENT
Start: 2019-09-19 | End: 2019-10-03 | Stop reason: SDUPTHER

## 2019-09-19 NOTE — PROGRESS NOTES
Assessment/Plan:         Diagnoses and all orders for this visit:    Mild episode of recurrent major depressive disorder (HCC)  -     FLUoxetine (PROzac) 10 mg capsule; Take 1 capsule (10 mg total) by mouth daily    Acquired hypothyroidism  -     CBC and differential  -     TSH, 3rd generation with Free T4 reflex    Impaired fasting glucose  -     Hemoglobin A1C    Mixed hyperlipidemia  -     Comprehensive metabolic panel  -     Lipid panel    Need for hepatitis C screening test  -     Hepatitis C antibody; Future          Repeat labs  Start Fluoxetine 10 mg/day in AM with food  Vitamin D 2,000 IU daily  Continue with current dose of Wellbutrin XL  Status report several weeks  The 10-year ASCVD risk score (Ganesh Randle et al , 2013) is: 3 2%    Values used to calculate the score:      Age: 59 years      Sex: Female      Is Non- : No      Diabetic: No      Tobacco smoker: No      Systolic Blood Pressure: 381 mmHg      Is BP treated: No      HDL Cholesterol: 66 mg/dL      Total Cholesterol: 210 mg/dL     Patient ID: Braeden Friedman is a 59 y o  female  Follow up visit  Longstanding history of depression currently on Wellbutrin  mg/day recently she has been experiencing depressive symptoms-feeling down, tearful  She had been on Prozac, Lexapro in the past along with Wellbutrin XL  Retired  Working part time at [employer] as a   Labs 01/2019 see note  The following portions of the patient's history were reviewed and updated as appropriate: allergies, current medications, past family history, past medical history, past social history, past surgical history and problem list     Review of Systems   Constitutional: Negative for appetite change, chills, fatigue, fever and unexpected weight change  4 lb weight loss from 01/2019    HENT: Negative for rhinorrhea  Respiratory: Negative for cough, shortness of breath and wheezing      Cardiovascular: Negative for chest pain, palpitations and leg swelling  Gastrointestinal: Negative for abdominal pain, blood in stool, constipation, diarrhea, nausea and vomiting  Colonoscopy 07/2016   Endocrine:        Hypothyroidism on Levothyroxine 200 mcg daily  Genitourinary: Negative for difficulty urinating  GYN and pap smear 10/2018  Mammogram 09/2018   Musculoskeletal: Negative for arthralgias and myalgias  S/p fracture left distal radius 02/2016 secondary to fall,   Skin: Negative for rash  s/p resection SCC right face  followed by dermatology  chronic hives with prior episodes of swelling of lips and tongue  prior allergist evaluation  food allergy panel 07/2017 + shrimp  no reaction to shrimp  No improvement with Allegra  She has an Epi Pen  Allergic/Immunologic: Positive for food allergies  Neurological: Negative for headaches  Psychiatric/Behavioral: Positive for dysphoric mood and sleep disturbance  Negative for suicidal ideas  The patient is not nervous/anxious  See HPI  Objective:      /74   Pulse 78   Temp (!) 97 3 °F (36 3 °C)   Resp 16   Ht 5' 2 2" (1 58 m)   Wt 97 5 kg (215 lb)   BMI 39 07 kg/m²          Physical Exam   Constitutional: She appears well-developed and well-nourished  No distress  Psychiatric: She has a normal mood and affect  Her speech is normal and behavior is normal  Judgment and thought content normal  Cognition and memory are normal  She is attentive           Recent Results (from the past 6048 hour(s))   CBC and differential    Collection Time: 01/28/19  9:02 AM   Result Value Ref Range    White Blood Cell Count 6 6 3 4 - 10 8 x10E3/uL    Red Blood Cell Count 4 22 3 77 - 5 28 x10E6/uL    Hemoglobin 13 6 11 1 - 15 9 g/dL    HCT 41 6 34 0 - 46 6 %    MCV 99 (H) 79 - 97 fL    MCH 32 2 26 6 - 33 0 pg    MCHC 32 7 31 5 - 35 7 g/dL    RDW 13 3 12 3 - 15 4 %    Platelet Count 004 533 - 379 x10E3/uL    Neutrophils 61 Not Estab  %    Lymphocytes 28 Not Estab  %    Monocytes 7 Not Estab  %    Eosinophils 3 Not Estab  %    Basophils PCT 1 Not Estab  %    Neutrophils (Absolute) 4 1 1 4 - 7 0 x10E3/uL    Lymphocytes (Absolute) 1 8 0 7 - 3 1 x10E3/uL    Monocytes (Absolute) 0 5 0 1 - 0 9 x10E3/uL    Eosinophils (Absolute) 0 2 0 0 - 0 4 x10E3/uL    Basophils ABS 0 0 0 0 - 0 2 x10E3/uL    Immature Granulocytes 0 Not Estab  %    Immature Granulocytes (Absolute) 0 0 0 0 - 0 1 x10E3/uL   Comprehensive metabolic panel    Collection Time: 01/28/19  9:02 AM   Result Value Ref Range    Glucose, Random 106 (H) 65 - 99 mg/dL    BUN 17 8 - 27 mg/dL    Creatinine 0 80 0 57 - 1 00 mg/dL    eGFR Non  79 >59 mL/min/1 73    eGFR  91 >59 mL/min/1 73    SL AMB BUN/CREATININE RATIO 21 12 - 28    Sodium 138 134 - 144 mmol/L    Potassium 4 4 3 5 - 5 2 mmol/L    Chloride 99 96 - 106 mmol/L    CO2 22 20 - 29 mmol/L    CALCIUM 9 6 8 7 - 10 3 mg/dL    Protein, Total 7 1 6 0 - 8 5 g/dL    Albumin 4 8 3 6 - 4 8 g/dL    Globulin, Total 2 3 1 5 - 4 5 g/dL    Albumin/Globulin Ratio 2 1 1 2 - 2 2    TOTAL BILIRUBIN 0 8 0 0 - 1 2 mg/dL    Alk Phos Isoenzymes 85 39 - 117 IU/L    AST 21 0 - 40 IU/L    ALT 25 0 - 32 IU/L   Lipid panel    Collection Time: 01/28/19  9:02 AM   Result Value Ref Range    Cholesterol, Total 210 (H) 100 - 199 mg/dL    Triglycerides 331 (H) 0 - 149 mg/dL    HDL 66 >39 mg/dL    VLDL Cholesterol Calculated 66 (H) 5 - 40 mg/dL    LDL Direct 78 0 - 99 mg/dL   Hemoglobin A1c (w/out EAG)    Collection Time: 01/28/19  9:02 AM   Result Value Ref Range    Hemoglobin A1C 5 4 4 8 - 5 6 %   TSH WITH REFLEX TO FREE T4    Collection Time: 01/28/19  9:02 AM   Result Value Ref Range    TSH 6 480 (H) 0 450 - 4 500 uIU/mL   T4, free    Collection Time: 01/28/19  9:02 AM   Result Value Ref Range    T4,Free (Direct) 1 15 0 82 - 1 77 ng/dL   Specimen Status Report    Collection Time: 01/28/19  9:02 AM   Result Value Ref Range    Comment Comment

## 2019-09-28 LAB
ALBUMIN SERPL-MCNC: 4.5 G/DL (ref 3.6–4.8)
ALBUMIN/GLOB SERPL: 2.1 {RATIO} (ref 1.2–2.2)
ALP SERPL-CCNC: 80 IU/L (ref 39–117)
ALT SERPL-CCNC: 24 IU/L (ref 0–32)
AST SERPL-CCNC: 23 IU/L (ref 0–40)
BASOPHILS # BLD AUTO: 0 X10E3/UL (ref 0–0.2)
BASOPHILS NFR BLD AUTO: 1 %
BILIRUB SERPL-MCNC: 0.4 MG/DL (ref 0–1.2)
BUN SERPL-MCNC: 15 MG/DL (ref 8–27)
BUN/CREAT SERPL: 21 (ref 12–28)
CALCIUM SERPL-MCNC: 9.3 MG/DL (ref 8.7–10.3)
CHLORIDE SERPL-SCNC: 107 MMOL/L (ref 96–106)
CHOLEST SERPL-MCNC: 181 MG/DL (ref 100–199)
CO2 SERPL-SCNC: 20 MMOL/L (ref 20–29)
CREAT SERPL-MCNC: 0.72 MG/DL (ref 0.57–1)
EOSINOPHIL # BLD AUTO: 0.2 X10E3/UL (ref 0–0.4)
EOSINOPHIL NFR BLD AUTO: 3 %
ERYTHROCYTE [DISTWIDTH] IN BLOOD BY AUTOMATED COUNT: 13 % (ref 12.3–15.4)
GLOBULIN SER-MCNC: 2.1 G/DL (ref 1.5–4.5)
GLUCOSE SERPL-MCNC: 113 MG/DL (ref 65–99)
HBA1C MFR BLD: 5.5 % (ref 4.8–5.6)
HCT VFR BLD AUTO: 38.2 % (ref 34–46.6)
HDLC SERPL-MCNC: 61 MG/DL
HGB BLD-MCNC: 12.6 G/DL (ref 11.1–15.9)
IMM GRANULOCYTES # BLD: 0 X10E3/UL (ref 0–0.1)
IMM GRANULOCYTES NFR BLD: 0 %
LDLC SERPL CALC-MCNC: 89 MG/DL (ref 0–99)
LYMPHOCYTES # BLD AUTO: 1.9 X10E3/UL (ref 0.7–3.1)
LYMPHOCYTES NFR BLD AUTO: 30 %
MCH RBC QN AUTO: 31 PG (ref 26.6–33)
MCHC RBC AUTO-ENTMCNC: 33 G/DL (ref 31.5–35.7)
MCV RBC AUTO: 94 FL (ref 79–97)
MONOCYTES # BLD AUTO: 0.6 X10E3/UL (ref 0.1–0.9)
MONOCYTES NFR BLD AUTO: 9 %
NEUTROPHILS # BLD AUTO: 3.6 X10E3/UL (ref 1.4–7)
NEUTROPHILS NFR BLD AUTO: 57 %
PLATELET # BLD AUTO: 159 X10E3/UL (ref 150–450)
POTASSIUM SERPL-SCNC: 4.6 MMOL/L (ref 3.5–5.2)
PROT SERPL-MCNC: 6.6 G/DL (ref 6–8.5)
RBC # BLD AUTO: 4.07 X10E6/UL (ref 3.77–5.28)
SL AMB EGFR AFRICAN AMERICAN: 103 ML/MIN/1.73
SL AMB EGFR NON AFRICAN AMERICAN: 89 ML/MIN/1.73
SL AMB T4, FREE (DIRECT): 1.56 NG/DL (ref 0.82–1.77)
SL AMB VLDL CHOLESTEROL CALC: 31 MG/DL (ref 5–40)
SODIUM SERPL-SCNC: 142 MMOL/L (ref 134–144)
TRIGL SERPL-MCNC: 154 MG/DL (ref 0–149)
TSH SERPL DL<=0.005 MIU/L-ACNC: 0.17 UIU/ML (ref 0.45–4.5)
WBC # BLD AUTO: 6.2 X10E3/UL (ref 3.4–10.8)

## 2019-10-03 ENCOUNTER — TELEPHONE (OUTPATIENT)
Dept: FAMILY MEDICINE CLINIC | Facility: CLINIC | Age: 64
End: 2019-10-03

## 2019-10-03 DIAGNOSIS — E03.9 ACQUIRED HYPOTHYROIDISM: Primary | ICD-10-CM

## 2019-10-03 DIAGNOSIS — F33.0 MILD EPISODE OF RECURRENT MAJOR DEPRESSIVE DISORDER (HCC): ICD-10-CM

## 2019-10-03 RX ORDER — FLUOXETINE 10 MG/1
10 CAPSULE ORAL DAILY
Qty: 90 CAPSULE | Refills: 3 | Status: SHIPPED | OUTPATIENT
Start: 2019-10-03 | End: 2019-10-08 | Stop reason: SDUPTHER

## 2019-10-03 NOTE — TELEPHONE ENCOUNTER
Call re labs  All labs are normal except for the following-  thyroid test shows dose of Levothyroxine too high  She is on Levothyroxine 200 mcg daily  I would change to 200 mcg daily M-Saturday 1/2 tablet on Sundays  TSH in 6 weeks   < 100  Borderline 100 to 125  "pre diabetes" treatment diet weight loss  Repeat FBS in 6 months  Cholesterol 181 decreased from 210 normal less than 200  Triglycerides 154 decreased from 331 normal < 150       Recent Results (from the past 504 hour(s))   CBC and differential    Collection Time: 09/25/19  7:07 AM   Result Value Ref Range    White Blood Cell Count 6 2 3 4 - 10 8 x10E3/uL    Red Blood Cell Count 4 07 3 77 - 5 28 x10E6/uL    Hemoglobin 12 6 11 1 - 15 9 g/dL    HCT 38 2 34 0 - 46 6 %    MCV 94 79 - 97 fL    MCH 31 0 26 6 - 33 0 pg    MCHC 33 0 31 5 - 35 7 g/dL    RDW 13 0 12 3 - 15 4 %    Platelet Count 892 855 - 450 x10E3/uL    Neutrophils 57 Not Estab  %    Lymphocytes 30 Not Estab  %    Monocytes 9 Not Estab  %    Eosinophils 3 Not Estab  %    Basophils PCT 1 Not Estab  %    Neutrophils (Absolute) 3 6 1 4 - 7 0 x10E3/uL    Lymphocytes (Absolute) 1 9 0 7 - 3 1 x10E3/uL    Monocytes (Absolute) 0 6 0 1 - 0 9 x10E3/uL    Eosinophils (Absolute) 0 2 0 0 - 0 4 x10E3/uL    Basophils ABS 0 0 0 0 - 0 2 x10E3/uL    Immature Granulocytes 0 Not Estab  %    Immature Granulocytes (Absolute) 0 0 0 0 - 0 1 x10E3/uL   Comprehensive metabolic panel    Collection Time: 09/25/19  7:07 AM   Result Value Ref Range    Glucose, Random 113 (H) 65 - 99 mg/dL    BUN 15 8 - 27 mg/dL    Creatinine 0 72 0 57 - 1 00 mg/dL    eGFR Non  89 >59 mL/min/1 73    eGFR  103 >59 mL/min/1 73    SL AMB BUN/CREATININE RATIO 21 12 - 28    Sodium 142 134 - 144 mmol/L    Potassium 4 6 3 5 - 5 2 mmol/L    Chloride 107 (H) 96 - 106 mmol/L    CO2 20 20 - 29 mmol/L    CALCIUM 9 3 8 7 - 10 3 mg/dL    Protein, Total 6 6 6 0 - 8 5 g/dL    Albumin 4 5 3 6 - 4 8 g/dL    Globulin, Total 2 1 1 5 - 4 5 g/dL    Albumin/Globulin Ratio 2 1 1 2 - 2 2    TOTAL BILIRUBIN 0 4 0 0 - 1 2 mg/dL    Alk Phos Isoenzymes 80 39 - 117 IU/L    AST 23 0 - 40 IU/L    ALT 24 0 - 32 IU/L   Lipid panel    Collection Time: 09/25/19  7:07 AM   Result Value Ref Range    Cholesterol, Total 181 100 - 199 mg/dL    Triglycerides 154 (H) 0 - 149 mg/dL    HDL 61 >39 mg/dL    VLDL Cholesterol Calculated 31 5 - 40 mg/dL    LDL Direct 89 0 - 99 mg/dL   Hemoglobin A1c (w/out EAG)    Collection Time: 09/25/19  7:07 AM   Result Value Ref Range    Hemoglobin A1C 5 5 4 8 - 5 6 %   TSH WITH REFLEX TO FREE T4    Collection Time: 09/25/19  7:07 AM   Result Value Ref Range    TSH 0 168 (L) 0 450 - 4 500 uIU/mL   T4, free    Collection Time: 09/25/19  7:07 AM   Result Value Ref Range    T4,Free (Direct) 1 56 0 82 - 1 77 ng/dL

## 2019-10-03 NOTE — TELEPHONE ENCOUNTER
Spoke with patient and gave her the message  Mailing out her lab slip  She will have labs done in 6 weeks

## 2019-10-07 ENCOUNTER — ANNUAL EXAM (OUTPATIENT)
Dept: OBGYN CLINIC | Facility: CLINIC | Age: 64
End: 2019-10-07
Payer: COMMERCIAL

## 2019-10-07 VITALS
SYSTOLIC BLOOD PRESSURE: 120 MMHG | WEIGHT: 210 LBS | BODY MASS INDEX: 33.75 KG/M2 | DIASTOLIC BLOOD PRESSURE: 70 MMHG | HEIGHT: 66 IN

## 2019-10-07 DIAGNOSIS — Z12.31 ENCOUNTER FOR SCREENING MAMMOGRAM FOR BREAST CANCER: Primary | ICD-10-CM

## 2019-10-07 DIAGNOSIS — Z01.419 ENCOUNTER FOR ANNUAL ROUTINE GYNECOLOGICAL EXAMINATION: ICD-10-CM

## 2019-10-07 PROCEDURE — 99396 PREV VISIT EST AGE 40-64: CPT | Performed by: OBSTETRICS & GYNECOLOGY

## 2019-10-07 NOTE — PROGRESS NOTES
Assessment/Plan:  Breast exam normal  GYN exam normal  Discussed colonoscopy screening  Colonoscopy due in 5 years  Diet and exercise discussed    Subjective:      Patient ID: Fang Henning is a 62 y o   postmenopausal female with a past medical history of hypothyroidism and colon polyps presents for her yearly visit  No breast or gynecological complaints  Patient is postmenopausal as of   Patient had a colonoscopy done in August  No polyps were found and patient  was cleared for testing every five years  Review of Systems   Constitutional: Negative  HENT: Negative  Eyes: Negative  Respiratory: Negative  Cardiovascular: Negative  Gastrointestinal: Negative  Endocrine: Negative  Genitourinary: Negative  Musculoskeletal: Negative  Skin: Negative  Allergic/Immunologic: Negative  Neurological: Negative  Hematological: Negative  Psychiatric/Behavioral: Negative  All other systems reviewed and are negative  Objective:      /70 (BP Location: Left arm, Patient Position: Sitting, Cuff Size: Standard)   Ht 5' 6 14" (1 68 m)   Wt 95 3 kg (210 lb)   BMI 33 75 kg/m²          Physical Exam   Constitutional: She is oriented to person, place, and time  She appears well-developed and well-nourished  HENT:   Head: Normocephalic and atraumatic  Neck: Normal range of motion  Neck supple  No tracheal deviation present  No thyromegaly present  Cardiovascular: Normal rate, regular rhythm and normal heart sounds  Pulmonary/Chest: Effort normal and breath sounds normal  No stridor  No respiratory distress  She has no wheezes  She has no rales  She exhibits no tenderness  Right breast exhibits no inverted nipple, no mass, no nipple discharge, no skin change and no tenderness  Left breast exhibits no inverted nipple, no mass, no nipple discharge, no skin change and no tenderness  No breast swelling, tenderness, discharge or bleeding   Breasts are symmetrical  Abdominal: Soft  Bowel sounds are normal  She exhibits no distension and no mass  There is no tenderness  There is no rebound and no guarding  No hernia  Hernia confirmed negative in the right inguinal area and confirmed negative in the left inguinal area  Genitourinary: Vagina normal and uterus normal  Rectal exam shows no external hemorrhoid, no internal hemorrhoid, no fissure and no mass  No breast swelling, tenderness, discharge or bleeding  No labial fusion  There is no rash, tenderness, lesion or injury on the right labia  There is no rash, tenderness, lesion or injury on the left labia  Uterus is not deviated, not enlarged, not fixed and not tender  Cervix exhibits no motion tenderness, no discharge and no friability  Right adnexum displays no mass, no tenderness and no fullness  Left adnexum displays no mass, no tenderness and no fullness  No erythema, tenderness or bleeding in the vagina  No foreign body in the vagina  No signs of injury around the vagina  No vaginal discharge found  Genitourinary Comments: Normal urethra  Mild vaginal atrophy   Lymphadenopathy: No inguinal adenopathy noted on the right or left side  Neurological: She is alert and oriented to person, place, and time  Skin: Skin is warm and dry  Psychiatric: She has a normal mood and affect   Her behavior is normal  Judgment and thought content normal

## 2019-10-08 DIAGNOSIS — F33.0 MILD EPISODE OF RECURRENT MAJOR DEPRESSIVE DISORDER (HCC): ICD-10-CM

## 2019-10-08 RX ORDER — FLUOXETINE 10 MG/1
10 CAPSULE ORAL DAILY
Qty: 90 CAPSULE | Refills: 3 | Status: SHIPPED | OUTPATIENT
Start: 2019-10-08 | End: 2020-07-27 | Stop reason: SDUPTHER

## 2019-11-26 LAB
SL AMB T4, FREE (DIRECT): 1.51 NG/DL (ref 0.82–1.77)
TSH SERPL DL<=0.005 MIU/L-ACNC: 0.35 UIU/ML (ref 0.45–4.5)

## 2019-11-29 ENCOUNTER — TELEPHONE (OUTPATIENT)
Dept: FAMILY MEDICINE CLINIC | Facility: CLINIC | Age: 64
End: 2019-11-29

## 2019-11-29 DIAGNOSIS — E03.9 ACQUIRED HYPOTHYROIDISM: Primary | ICD-10-CM

## 2019-11-29 NOTE — TELEPHONE ENCOUNTER
Call re labs TSH  Still showing thyroid slightly over active but improved  Current dose of Levothyroxine 200 mcg M-Saturdays  1/2 tablet Sundays  I would change to 200 mg M-Fridays and 1/2 tablet Saturdays and Sundays   TSH in 6 weeks order placed     Lab Results   Component Value Date    [id number] 1 130 01/20/2017    TSH 0 352 (L) 11/22/2019

## 2019-12-15 DIAGNOSIS — E03.9 ACQUIRED HYPOTHYROIDISM: ICD-10-CM

## 2019-12-16 RX ORDER — LEVOTHYROXINE SODIUM 0.2 MG/1
TABLET ORAL
Qty: 90 TABLET | Refills: 3 | Status: SHIPPED | OUTPATIENT
Start: 2019-12-16 | End: 2021-01-01

## 2020-01-15 DIAGNOSIS — F33.42 RECURRENT MAJOR DEPRESSIVE DISORDER, IN FULL REMISSION (HCC): ICD-10-CM

## 2020-01-15 DIAGNOSIS — E78.00 HYPERCHOLESTEREMIA: ICD-10-CM

## 2020-01-16 RX ORDER — BUPROPION HYDROCHLORIDE 300 MG/1
TABLET ORAL
Qty: 90 TABLET | Refills: 3 | Status: SHIPPED | OUTPATIENT
Start: 2020-01-16 | End: 2020-12-17

## 2020-01-16 RX ORDER — PRAVASTATIN SODIUM 20 MG
TABLET ORAL
Qty: 90 TABLET | Refills: 3 | Status: SHIPPED | OUTPATIENT
Start: 2020-01-16 | End: 2020-12-17

## 2020-01-20 LAB — TSH SERPL DL<=0.005 MIU/L-ACNC: 0.7 UIU/ML (ref 0.45–4.5)

## 2020-01-21 ENCOUNTER — TELEPHONE (OUTPATIENT)
Dept: FAMILY MEDICINE CLINIC | Facility: CLINIC | Age: 65
End: 2020-01-21

## 2020-01-21 NOTE — TELEPHONE ENCOUNTER
Call re TSH normal no changes in Levothyroxine dose repeat TSH in 3 to 6 months       Lab Results   Component Value Date    [id number] 1 130 01/20/2017    TSH 0 705 01/17/2020

## 2020-01-26 NOTE — PROGRESS NOTES
Assessment/Plan:     Diagnoses and all orders for this visit:    Well adult exam    Mixed hyperlipidemia  -     Lipid panel    Acquired hypothyroidism  -     TSH, 3rd generation with Free T4 reflex    Impaired fasting glucose  -     Comprehensive metabolic panel  -     Hemoglobin A1C    DIONE on CPAP    Food allergy    Chronic urticaria    Recurrent major depressive disorder, in full remission (HCC)    Osteopenia of right forearm          Continue with current medications  Repeat labs in 6 months  Up to date with adult vaccines  Re chronic urticaria I recommended Zyrtec 10 mg daily in AM and Pepcid 20 mg at HS  BMI Counseling: Body mass index is 33 69 kg/m²  The BMI is above normal  Nutrition recommendations include reducing portion sizes, decreasing overall calorie intake, consuming healthier snacks, moderation in carbohydrate intake, reducing intake of saturated fat and trans fat and reducing intake of cholesterol  Exercise recommendations include exercising 3-5 times per week  Patient ID: Donny Crystal is a 62 y o  female  62year-old female here for wellness exam  Medications reviewed  Hyperlipidemia mixed type with high HDL  on Pravastatin 20 mg daily and fish oils  Lipid profile 09/2019 cholesterol 181  Triglycerides 154  HDL 61  LDL 89  Impaired fasting glucose 09/2019   A1c 5 5  Non smoker  Exercises several times a week  [facility] [employer]  The following portions of the patient's history were reviewed and updated as appropriate: allergies, current medications, past family history, past medical history, past social history, past surgical history and problem list     Review of Systems   Constitutional: Positive for unexpected weight change  Negative for appetite change, chills, fatigue and fever  5 lb weight loss from 09/2019 with dieting and exercise  HENT: Negative for congestion, ear pain, rhinorrhea, sore throat and trouble swallowing      Eyes: Negative for visual disturbance  Respiratory: Negative for cough, shortness of breath and wheezing  Cardiovascular: Negative for chest pain, palpitations and leg swelling  Gastrointestinal: Negative for abdominal pain, blood in stool, constipation, diarrhea, nausea and vomiting  Colonoscopy 08/2019 normal except for severe diverticulosis sigmoid coon  Endocrine:        Hypothyroidism on Levothyroxine 200 mcg daily  01/20202 TSH normal at 0 705  DEXA scan 02/2019 osteopenia T score -2 3 right distal radius  Fracture risk assessment low risk  On MVI daily    Genitourinary: Negative for difficulty urinating  GYN and pap smear 10/2018  Mammogram 09/2019   Musculoskeletal: Negative for arthralgias and myalgias  S/p fracture left distal radius 02/2016 secondary to fall,   Skin: Negative for rash  s/p resection SCC right face followed by dermatology  chronic hives with prior episodes of swelling of lips and tongue  Prior allergist evaluation  food allergy panel 07/2017 + shrimp  no reaction to shrimp  Repeat allergy testing 2018 + trees, grass, dust and eggs  No improvement with Allegra  She has an Epi Pen  Allergic/Immunologic: Positive for food allergies  Neurological: Negative for dizziness and headaches  Hematological: Negative for adenopathy  Does not bruise/bleed easily  Psychiatric/Behavioral: Positive for dysphoric mood  Negative for sleep disturbance and suicidal ideas  The patient is not nervous/anxious  Longstanding history of depression stable on Wellbutrin  mg/day and Prozac 10 mg daily  Objective:      /64   Pulse 88   Temp 97 8 °F (36 6 °C)   Resp 16   Ht 5' 6 2" (1 681 m)   Wt 95 3 kg (210 lb)   BMI 33 69 kg/m²     Wt Readings from Last 3 Encounters:   01/27/20 95 3 kg (210 lb)   10/07/19 95 3 kg (210 lb)   09/19/19 97 5 kg (215 lb)        Physical Exam   Constitutional: She is oriented to person, place, and time   She appears well-developed and well-nourished  No distress  HENT:   Mouth/Throat: Oropharynx is clear and moist and mucous membranes are normal  No oral lesions  Normal dentition  Eyes: Pupils are equal, round, and reactive to light  Conjunctivae and EOM are normal  No scleral icterus  Neck: No JVD present  Carotid bruit is not present  No tracheal deviation present  No thyroid mass and no thyromegaly present  Cardiovascular: Normal rate, regular rhythm and normal heart sounds  Exam reveals no gallop  No murmur heard  Pulmonary/Chest: Effort normal and breath sounds normal  No respiratory distress  She has no wheezes  She has no rales  Abdominal: Soft  Bowel sounds are normal  She exhibits no distension, no abdominal bruit and no mass  There is no hepatosplenomegaly  There is no tenderness  There is no rebound and no guarding  Musculoskeletal: Normal range of motion  She exhibits no edema or deformity  Lymphadenopathy:     She has no cervical adenopathy  Neurological: She is alert and oriented to person, place, and time  She displays normal reflexes  No cranial nerve deficit  Skin: No rash noted  No cyanosis  Nails show no clubbing  Psychiatric: She has a normal mood and affect  Nursing note and vitals reviewed          Recent Results (from the past 672 hour(s))   TSH WITH REFLEX TO FREE T4    Collection Time: 01/17/20  8:22 AM   Result Value Ref Range    TSH 0 705 0 450 - 4 500 uIU/mL     Hemoglobin A1C (%)   Date Value   09/25/2019 5 5   01/28/2019 5 4   05/14/2018 5 6       Lab Results   Component Value Date     01/20/2017    SODIUM 142 09/25/2019    K 4 6 09/25/2019     (H) 09/25/2019    CO2 20 09/25/2019    ANIONGAP 11 04/15/2014    AGAP 11 02/16/2017    BUN 15 09/25/2019    CREATININE 0 72 09/25/2019    GLUC 113 (H) 09/25/2019    CALCIUM 9 6 02/16/2017    AST 23 09/25/2019    ALT 24 09/25/2019    ALKPHOS 81 02/16/2017    PROT 6 8 01/20/2017    TP 6 6 09/25/2019    BILITOT 0 5 01/20/2017 TBILI 0 4 09/25/2019    EGFR >60 0 02/16/2017       Lab Results   Component Value Date    WBC 6 2 09/25/2019    HGB 12 6 09/25/2019    HCT 38 2 09/25/2019    MCV 94 09/25/2019     09/25/2019       Lab Results   Component Value Date    CHOLESTEROL 181 09/25/2019    CHOLESTEROL 210 (H) 01/28/2019    CHOLESTEROL 210 (H) 05/14/2018     Lab Results   Component Value Date    HDL 61 09/25/2019    HDL 66 01/28/2019    HDL 85 05/14/2018     Lab Results   Component Value Date    TRIG 154 (H) 09/25/2019    TRIG 331 (H) 01/28/2019    TRIG 228 (H) 05/14/2018     No results found for: Dusty

## 2020-01-27 ENCOUNTER — OFFICE VISIT (OUTPATIENT)
Dept: FAMILY MEDICINE CLINIC | Facility: CLINIC | Age: 65
End: 2020-01-27
Payer: COMMERCIAL

## 2020-01-27 VITALS
WEIGHT: 210 LBS | RESPIRATION RATE: 16 BRPM | SYSTOLIC BLOOD PRESSURE: 110 MMHG | HEART RATE: 88 BPM | DIASTOLIC BLOOD PRESSURE: 64 MMHG | HEIGHT: 66 IN | TEMPERATURE: 97.8 F | BODY MASS INDEX: 33.75 KG/M2

## 2020-01-27 DIAGNOSIS — G47.33 OSA ON CPAP: ICD-10-CM

## 2020-01-27 DIAGNOSIS — Z91.018 FOOD ALLERGY: ICD-10-CM

## 2020-01-27 DIAGNOSIS — L50.8 CHRONIC URTICARIA: ICD-10-CM

## 2020-01-27 DIAGNOSIS — M85.831 OSTEOPENIA OF RIGHT FOREARM: ICD-10-CM

## 2020-01-27 DIAGNOSIS — E03.9 ACQUIRED HYPOTHYROIDISM: ICD-10-CM

## 2020-01-27 DIAGNOSIS — E78.2 MIXED HYPERLIPIDEMIA: ICD-10-CM

## 2020-01-27 DIAGNOSIS — R73.01 IMPAIRED FASTING GLUCOSE: ICD-10-CM

## 2020-01-27 DIAGNOSIS — Z99.89 OSA ON CPAP: ICD-10-CM

## 2020-01-27 DIAGNOSIS — F33.42 RECURRENT MAJOR DEPRESSIVE DISORDER, IN FULL REMISSION (HCC): ICD-10-CM

## 2020-01-27 DIAGNOSIS — Z00.00 WELL ADULT EXAM: Primary | ICD-10-CM

## 2020-01-27 PROCEDURE — 99396 PREV VISIT EST AGE 40-64: CPT | Performed by: FAMILY MEDICINE

## 2020-04-29 ENCOUNTER — TELEPHONE (OUTPATIENT)
Dept: FAMILY MEDICINE CLINIC | Facility: CLINIC | Age: 65
End: 2020-04-29

## 2020-04-29 DIAGNOSIS — L50.9 HIVES: Primary | ICD-10-CM

## 2020-04-29 RX ORDER — METHYLPREDNISOLONE 4 MG/1
TABLET ORAL
Qty: 21 EACH | Refills: 0 | Status: SHIPPED | OUTPATIENT
Start: 2020-04-29 | End: 2020-07-01 | Stop reason: ALTCHOICE

## 2020-06-20 ENCOUNTER — NURSE TRIAGE (OUTPATIENT)
Dept: OTHER | Facility: OTHER | Age: 65
End: 2020-06-20

## 2020-07-01 ENCOUNTER — OFFICE VISIT (OUTPATIENT)
Dept: FAMILY MEDICINE CLINIC | Facility: CLINIC | Age: 65
End: 2020-07-01
Payer: COMMERCIAL

## 2020-07-01 VITALS
WEIGHT: 216 LBS | DIASTOLIC BLOOD PRESSURE: 70 MMHG | TEMPERATURE: 98.2 F | SYSTOLIC BLOOD PRESSURE: 108 MMHG | HEART RATE: 96 BPM | RESPIRATION RATE: 16 BRPM | BODY MASS INDEX: 34.65 KG/M2

## 2020-07-01 DIAGNOSIS — F10.20 ALCOHOLISM (HCC): ICD-10-CM

## 2020-07-01 DIAGNOSIS — F33.1 MODERATE EPISODE OF RECURRENT MAJOR DEPRESSIVE DISORDER (HCC): ICD-10-CM

## 2020-07-01 DIAGNOSIS — H61.23 IMPACTED CERUMEN, BILATERAL: ICD-10-CM

## 2020-07-01 DIAGNOSIS — S40.012A CONTUSION OF LEFT SHOULDER, INITIAL ENCOUNTER: Primary | ICD-10-CM

## 2020-07-01 DIAGNOSIS — E03.9 ACQUIRED HYPOTHYROIDISM: ICD-10-CM

## 2020-07-01 DIAGNOSIS — K76.0 FATTY LIVER: ICD-10-CM

## 2020-07-01 DIAGNOSIS — S10.93XA HEMATOMA OF NECK, INITIAL ENCOUNTER: ICD-10-CM

## 2020-07-01 PROCEDURE — 69210 REMOVE IMPACTED EAR WAX UNI: CPT | Performed by: FAMILY MEDICINE

## 2020-07-01 PROCEDURE — 1036F TOBACCO NON-USER: CPT | Performed by: FAMILY MEDICINE

## 2020-07-01 PROCEDURE — 99214 OFFICE O/P EST MOD 30 MIN: CPT | Performed by: FAMILY MEDICINE

## 2020-07-01 RX ORDER — MAGNESIUM 200 MG
1 TABLET ORAL DAILY
COMMUNITY

## 2020-07-01 NOTE — PROGRESS NOTES
Assessment/Plan:         Diagnoses and all orders for this visit:    Contusion of left shoulder, initial encounter  -     MRI shoulder left wo contrast; Future    Hematoma of neck, initial encounter    Alcoholism ([facility] [address] )    Moderate episode of [facility] disorder [facility]    Acquired hypothyroidism    Fatty liver    Impacted cerumen, bilateral  -     Ear cerumen removal    Other orders  -     Calcium Carb-Cholecalciferol (Calcium 500/D) 500-400 MG-UNIT CHEW; Chew 1 tablet daily  -     Magnesium 200 MG TABS; Take 1 tablet by mouth daily          Status post fall with contusion left shoulder- clinical findings concerning for left shoulder labral tear element of impingement  MRI left shoulder without contrast   No treatment at this time needed for left supraclavicular hematoma  Watch for any signs of increasing left arm pain, numbness or pallor  Increase Fluoxetine to 20 mg a day  Vitamin D 2,000 IU daily, B complex daily  Status report in several weeks  She has a follow-up with Psychiatry but not for 2 months  As a separate procedure today I performed bilateral cerumen impaction removal see procedure note     Patient ID: Jacey Kessler is a 62 y o  female  S/p fall at home tripping over gate for her dog injuring her left shoulder  Fall occurred while she was drinking  History of alcoholism with prior in patient rehab  She had been attending AA meetings until onset of COV 19  She relapsed and started drinking again  After her fall she was seen in ER at [place] [id number] 06/20/2020 X-rays left shoulder normal   CT scan neck showed hematoma within the left supraclavicular fossa  Hematoma just anterior to the left subclavian artery  No evidence of active hemorrhage  CT scan chest left cirilo clavicular edema no evidence of pneumothorax or other traumatic abnormality within the chest   Hepatic fatty infiltration  No rib or clavicular fracture seen  Lab CBC normal   Hemoglobin 13 7    Chemistry profile random blood glucose 98  Electrolytes normal   Creatinine 0 70  LFTs normal except for bilirubin 1 2  TSH 1 90  Ethanol level negative  Urine drug screen positive for benzodiazepine  She opted to pursue out patient treatment for her alcoholism  She has started back with AA meetings  She started a partial out program 3 days a week at [employer]  The following portions of the patient's history were reviewed and updated as appropriate: allergies, current medications, past family history, past medical history, past social history, past surgical history and problem list     Review of Systems   Constitutional: Positive for unexpected weight change  Negative for appetite change, chills, fatigue and fever  6 lb weight gain from 10/2019  HENT: Negative for congestion, ear discharge, ear pain, rhinorrhea, sore throat and trouble swallowing  Decreased hearing right ear  Eyes: Negative for visual disturbance  Respiratory: Negative for cough, shortness of breath and wheezing  Cardiovascular: Negative for chest pain, palpitations and leg swelling  Gastrointestinal: Negative for abdominal pain, blood in stool, constipation, diarrhea, nausea and vomiting  Colonoscopy 08/2019 normal except for severe diverticulosis sigmoid coon  Endocrine:        Hypothyroidism on Levothyroxine 200 mcg daily  06/2020 TSH normal at 1 90  DEXA scan 02/2019 osteopenia T score -2 3 right distal radius  Fracture risk assessment low risk  On MVI daily    Genitourinary: Negative for difficulty urinating  GYN and pap smear 10/2018  Mammogram 09/2019   Musculoskeletal: Negative for arthralgias and myalgias  S/p fracture left distal radius 02/2016 secondary to fall,   Skin: Negative for rash  s/p resection SCC right face followed by dermatology  chronic hives with prior episodes of swelling of lips and tongue  Prior allergist evaluation  food allergy panel 07/2017 + shrimp   no reaction to shrimp  Repeat allergy testing 2018 + trees, grass, dust and eggs  No improvement with Allegra  She has an Epi Pen  Allergic/Immunologic: Positive for food allergies  Neurological: Negative for dizziness and headaches  Hematological: Negative for adenopathy  Does not bruise/bleed easily  Psychiatric/Behavioral: Positive for dysphoric mood  Negative for sleep disturbance and suicidal ideas  The patient is nervous/anxious  Longstanding history of depression currently on Wellbutrin  mg/day and Prozac 10 mg daily  She feels she needs an adjustment of her medication  She has felt more depressed since she stopped drinking  Objective:      /70   Pulse 96   Temp 98 2 °F (36 8 °C)   Resp 16   Wt 98 kg (216 lb)   BMI 34 65 kg/m²     Wt Readings from Last 3 Encounters:   07/01/20 98 kg (216 lb)   01/27/20 95 3 kg (210 lb)   10/07/19 95 3 kg (210 lb)          Physical Exam   Constitutional: She is oriented to person, place, and time  She appears well-developed and well-nourished  No distress  HENT:   Bilateral impacted cerumen   Eyes: Conjunctivae are normal  No scleral icterus  Neck: No JVD present  No tracheal deviation present  No thyroid mass and no thyromegaly present  Cardiovascular: Normal rate, regular rhythm and normal heart sounds  Exam reveals no gallop  No murmur heard  Pulmonary/Chest: Effort normal  No respiratory distress  She has no wheezes  She has no rales  Musculoskeletal: Normal range of motion  Full range of motion of left shoulder  Large hematoma left supraclavicular area  Neurovascular exam left arm intact  Tenderness left subacromial area  No effusion left shoulder no warmth or redness  Positive impingement sign  Negative empty can sign  Positive Kingsbury test   Negative sulcus sign  Negative Speed test   Lymphadenopathy:     She has no cervical adenopathy  Right: No supraclavicular adenopathy present          Left: No supraclavicular adenopathy present  Neurological: She is alert and oriented to person, place, and time  She has normal strength  She displays normal reflexes  No sensory deficit  Strength and sensation left upper extremity normal    Psychiatric: She has a normal mood and affect  Her speech is normal and behavior is normal  Judgment and thought content normal  Cognition and memory are normal          Ear cerumen removal  Date/Time: 7/1/2020 2:50 PM  Performed by: Anson Severance, MD  Authorized by: Anson Severance, MD     Patient location:  Clinic  Other Assisting Provider: No    Consent:     Consent obtained:  Verbal  Procedure details:     Local anesthetic:  None    Location:  L ear and R ear    Procedure type: irrigation with instrumentation      Instrumentation: curette      Approach:  External  Post-procedure details:     Complication:  None    Hearing quality:  Normal    Patient tolerance of procedure:   Tolerated well, no immediate complications  Comments:      Post procedure both TMs and ear canals normal

## 2020-07-07 ENCOUNTER — TELEPHONE (OUTPATIENT)
Dept: FAMILY MEDICINE CLINIC | Facility: CLINIC | Age: 65
End: 2020-07-07

## 2020-07-07 NOTE — TELEPHONE ENCOUNTER
Coretta from Pre Encounter called  Patient is scheduled for MRI  tomorrow and wanted to know if Prior Auth went through  Please advise

## 2020-07-23 ENCOUNTER — HOSPITAL ENCOUNTER (OUTPATIENT)
Dept: RADIOLOGY | Age: 65
Discharge: HOME | End: 2020-07-23
Payer: COMMERCIAL

## 2020-07-23 DIAGNOSIS — S40.012A CONTUSION OF LEFT SHOULDER, INITIAL ENCOUNTER: ICD-10-CM

## 2020-07-23 PROCEDURE — 73221 MRI JOINT UPR EXTREM W/O DYE: CPT

## 2020-07-24 ENCOUNTER — TELEPHONE (OUTPATIENT)
Dept: FAMILY MEDICINE CLINIC | Facility: CLINIC | Age: 65
End: 2020-07-24

## 2020-07-24 DIAGNOSIS — M75.102 ROTATOR CUFF TEAR ARTHROPATHY OF LEFT SHOULDER: Primary | ICD-10-CM

## 2020-07-24 DIAGNOSIS — M12.812 ROTATOR CUFF TEAR ARTHROPATHY OF LEFT SHOULDER: Primary | ICD-10-CM

## 2020-07-24 NOTE — TELEPHONE ENCOUNTER
Call re MRI left shoulder-severe tendonitis with rotator cuff tear ( supraspinatus tendon)  I would recommend orthopedic evaluation  Procedure: Mri Shoulder Left Wo Contrast    Result Date: 7/23/2020  Narrative: MRI LEFT SHOULDER INDICATION:   [id number]: Contusion of left shoulder, initial encounter  Kylee Browning note 7/1/20 reviewed, status post fall  Concern for labral tear/impingement COMPARISON:  None  TECHNIQUE:   The following MR sequences were obtained of the left shoulder: Localizer, axial GRE/PD fat sat, oblique coronal T2 fat sat, oblique sagittal T1/T2 fat sat  Imaging performed on 3 0T MRI Gadolinium was not used  FINDINGS: SUBCUTANEOUS TISSUES: Normal JOINT EFFUSION: There is a small glenohumeral joint effusion  ACROMION PROCESS: Normal  ROTATOR CUFF: There is severe supraspinatus and infraspinatus tendinosis and hypertrophy without focal full thickness defect  There is likely high-grade articular surface tear involving the supraspinatus, best appreciated on series 4/13, measuring about 6 mm in width, with about 12 mm of retraction, best appreciated on series 4/13 Intrasubstance subscapularis tear measures about 3 mm in width as noted on series 6/11 SUBACROMIAL/SUBDELTOID BURSA: There is mild subacromial/subdeltoid bursitis  LONG HEAD OF BICEPS TENDON: There is mild tendinosis without tear  GLENOID LABRUM: Degenerative GLENOHUMERAL JOINT: Intact  ACROMIOCLAVICULAR JOINT:  There is moderate osteoarthritis  BONES: Normal      Impression: 1  Severe supraspinatus and infraspinatus tendinosis with high-grade articular surface supraspinatus tear 2  Intrasubstance subscapularis tear 3  Mild long head biceps tendinosis 4    Mild subacromion subdeltoid bursitis Workstation performed: [id number]

## 2020-07-27 DIAGNOSIS — F33.0 MILD EPISODE OF RECURRENT MAJOR DEPRESSIVE DISORDER (HCC): ICD-10-CM

## 2020-07-27 RX ORDER — FLUOXETINE HYDROCHLORIDE 20 MG/1
20 CAPSULE ORAL DAILY
Qty: 90 CAPSULE | Refills: 1 | Status: SHIPPED | OUTPATIENT
Start: 2020-07-27 | End: 2021-02-22

## 2020-07-27 NOTE — TELEPHONE ENCOUNTER
Patient called requesting refill on Prozac 20 mg one daily 90 day supply with refills to [facility]

## 2020-07-31 ENCOUNTER — OFFICE VISIT (OUTPATIENT)
Dept: OBGYN CLINIC | Facility: MEDICAL CENTER | Age: 65
End: 2020-07-31
Payer: COMMERCIAL

## 2020-07-31 VITALS
DIASTOLIC BLOOD PRESSURE: 75 MMHG | SYSTOLIC BLOOD PRESSURE: 125 MMHG | HEART RATE: 57 BPM | HEIGHT: 66 IN | TEMPERATURE: 97.6 F | WEIGHT: 216 LBS | BODY MASS INDEX: 34.72 KG/M2

## 2020-07-31 DIAGNOSIS — M75.102 ROTATOR CUFF TEAR ARTHROPATHY OF LEFT SHOULDER: ICD-10-CM

## 2020-07-31 DIAGNOSIS — M12.812 ROTATOR CUFF TEAR ARTHROPATHY OF LEFT SHOULDER: ICD-10-CM

## 2020-07-31 PROCEDURE — 3008F BODY MASS INDEX DOCD: CPT | Performed by: ORTHOPAEDIC SURGERY

## 2020-07-31 PROCEDURE — 1036F TOBACCO NON-USER: CPT | Performed by: ORTHOPAEDIC SURGERY

## 2020-07-31 PROCEDURE — 99203 OFFICE O/P NEW LOW 30 MIN: CPT | Performed by: ORTHOPAEDIC SURGERY

## 2020-07-31 RX ORDER — TRAMADOL HYDROCHLORIDE 50 MG/1
50 TABLET ORAL EVERY 6 HOURS PRN
Qty: 40 TABLET | Refills: 0 | Status: SHIPPED | OUTPATIENT
Start: 2020-07-31 | End: 2021-02-08 | Stop reason: ALTCHOICE

## 2020-07-31 NOTE — PROGRESS NOTES
Orthopaedic Surgery - Office Note  Elver Lujan (39 y o  female)   : 1955   MRN: [id number]  Encounter Date: 2020    Chief Complaint   Patient presents with    Left Shoulder - Pain       Assessment / Plan  Left partial supraspinatus and partial subscapularis tears    · Referral to PT was given  · We will hold off on CSI for now  · If she fails to improve, we will consider possible need for surgical intervention  Return in about 6 weeks (around 2020)  History of Present Illness  Elver Lujan is a 62 y o  female who presents for evaluation of left shoulder pain after a fall over a dog gate on 20  Since then she has continuted to have shoulder pain, worse at night and reaching overhead  She has been taking oral anti-inflammatories with minimal relief  She has not had PT or CSI so far  Denies numbness in the LUE  Review of Systems  Pertinent items are noted in HPI  All other systems were reviewed and are negative  Physical Exam  /75   Pulse 57   Temp 97 6 °F (36 4 °C)   Ht 5' 6" (1 676 m)   Wt 98 kg (216 lb)   BMI 34 86 kg/m²   Cons: Appears well  No apparent distress  Psych: Alert  Oriented x3  Mood and affect normal   Eyes: PERRLA, EOMI  Resp: Normal effort  No audible wheezing or stridor  CV: Palpable pulse  No discernable arrhythmia  No LE edema  Lymph:  No palpable cervical, axillary, or inguinal lymphadenopathy  Skin: Warm  No palpable masses  No visible lesions  Neuro: Normal muscle tone  Normal and symmetric DTR's  Left Shoulder Exam  Alignment / Posture:  mild scapular protraction  Inspection:  No swelling  No ecchymosis  No muscle atrophy  Palpation:  moderate subacromial tenderness  No warmth  ROM:  Shoulder   Shoulder ER 60  Shoulder IR T8  Strength:  Supraspinatus 4/5  Infraspinatus 4/5  Subscapularis 4/5  Stability:  No objective shoulder instability  Tests: (+) Marcial  (+) Neer  (+) Painful arc  (-) Drop arm   (-) Belly press   Neurovascular:  Sensation intact in Ax/R/M/U nerve distributions  2+ radial pulse  Studies Reviewed  I have personally reviewed pertinent films in PACS  MRI of left shoulder - partial articular supraspinatus tear and partial subscapularis tear    Procedures  No procedures today  Medical, Surgical, Family, and Social History  The patient's medical history, family history, and social history, were reviewed and updated as appropriate      Past Medical History:   Diagnosis Date    Anxiety     Closed fracture of distal radius and ulna, left, initial encounter     Last Assessed:  5/13/16    Depression     Disease of thyroid gland     Essential hypertension     Fibromyalgia     GERD (gastroesophageal reflux disease)     Sleep apnea     pt uses c-pap    Squamous cell carcinoma of skin     Last Assessed:  1/17/17       Past Surgical History:   Procedure Laterality Date    COLONOSCOPY N/A 7/14/2016    Procedure: COLONOSCOPY WITH ENDOCLIPS AND INJECTION FOR HEMOSTASIS;  Surgeon: Reinaldo Momin MD;  Location:  MAIN OR;  Service:     COLONOSCOPY  08/2019    MOHS RECONSTRUCTION      NASAL FRACTURE SURGERY      OH OPEN RX DISTAL RADIUS FX, INTRA-ARTICULAR, 3+ FRAG Left 2/25/2016    Procedure: OPEN REDUCTION W/ INTERNAL FIXATION (ORIF)DISTAL  RADIUS ;  Surgeon: Brandy Casillas MD;  Location: [facility];  Service: Orthopedics       Family History   Problem Relation Age of Onset    Cancer Mother         Breast    Breast cancer Mother     Colon cancer Mother     Cancer Father         Colon    Colon cancer Father        Social History     Occupational History    Not on file   Tobacco Use    Smoking status: Former Smoker    Smokeless tobacco: Never Used   Substance and Sexual Activity    Alcohol use: No    Drug use: No    Sexual activity: Not Currently       Allergies   Allergen Reactions    Erythromycin Shortness Of Breath     Reaction Date: 20Oct2011;     Cefprozil      Reaction Date: [id number];     Cephalosporins          Current Outpatient Medications:     buPROPion (WELLBUTRIN XL) 300 mg 24 hr tablet, TAKE 1 TABLET BY MOUTH  DAILY, Disp: 90 tablet, Rfl: 3    Calcium Carb-Cholecalciferol (Calcium 500/D) 500-400 MG-UNIT CHEW, Chew 1 tablet daily, Disp: , Rfl:     FLUoxetine (PROzac) 20 mg capsule, Take 1 capsule (20 mg total) by mouth daily, Disp: 90 capsule, Rfl: 1    levothyroxine 200 mcg tablet, TAKE 1 TABLET BY MOUTH  DAILY IN THE EARLY MORNING, Disp: 90 tablet, Rfl: 3    Magnesium 200 MG TABS, Take 1 tablet by mouth daily, Disp: , Rfl:     Multiple Vitamins-Minerals (MULTIVITAMIN ADULT PO), Take 1 tablet by mouth daily, Disp: , Rfl:     Omega-3 Fatty Acids (FISH OIL) 1000 MG CPDR, Take 1 capsule by mouth daily, Disp: , Rfl:     pravastatin (PRAVACHOL) 20 mg tablet, TAKE 1 TABLET BY MOUTH  DAILY, Disp: 90 tablet, Rfl: 3    vitamin E 100 UNIT capsule, Take 1 capsule by mouth daily, Disp: , Rfl:     traMADol (ULTRAM) 50 mg tablet, Take 1 tablet (50 mg total) by mouth every 6 (six) hours as needed for moderate pain, Disp: 40 tablet, Rfl: 0    Current Facility-Administered Medications:     EPINEPHrine PF (ADRENALIN) 1 mg/mL injection 0 5 mg, 0 5 mg, Intramuscular, Once, MD María Petit MD    Scribe Attestation    I,:    am acting as a scribe while in the presence of the attending physician :        I,:    personally performed the services described in this documentation    as scribed in my presence :

## 2020-08-03 ENCOUNTER — EVALUATION (OUTPATIENT)
Dept: PHYSICAL THERAPY | Facility: CLINIC | Age: 65
End: 2020-08-03
Payer: COMMERCIAL

## 2020-08-03 DIAGNOSIS — M75.102 ROTATOR CUFF TEAR ARTHROPATHY OF LEFT SHOULDER: ICD-10-CM

## 2020-08-03 DIAGNOSIS — M12.812 ROTATOR CUFF TEAR ARTHROPATHY OF LEFT SHOULDER: ICD-10-CM

## 2020-08-03 PROCEDURE — 97162 PT EVAL MOD COMPLEX 30 MIN: CPT | Performed by: PHYSICAL THERAPIST

## 2020-08-03 NOTE — PROGRESS NOTES
PT Evaluation     Today's date: 8/3/2020  Patient name: Mariam Schwab  : 1955   MRN: [id number]  Referring provider: Leticia Kirkland MD  Dx:   Encounter Diagnosis     ICD-10-CM    1  Rotator cuff tear arthropathy of left shoulder  M75 102 Ambulatory referral to Physical Therapy    M12 812        Start Time: 1745  Stop Time: 1830  Total time in clinic (min): 45 minutes    Assessment  Assessment details: Mariam Schwab is a 62 y o  female who presents with signs and symptoms consistent with referring diagnosis of RTC tear and tendopathies  Patient reports that she has had to falls in the last few months secondary to trip over baby gate in her home  Patient reports that she had mild pain after the first but did not seek medical help and it resolved on its own  However, in  patient fell again on the same shoulder and had significant pain  Patient did not hear an audible pop but did have swelling in the clavicle region for a short time after  Patient waited a few weeks to see if it would resolve however did not have relief in symptoms  Patient received MRI  Showing involvement of supraspinatus, infraspinatus, subscapularis, bicep tendon, and subacromial bursa  Patient reports a sharp/shooting pain that is intermittent but aggravated by positions and certain movements  Aggravating factors include, sleeping on the L side, lifting, lowering the arm from an elevated position, turning the steering wheel, putting bra on in the back, reaching behind  Patient presents with pain, decreased strength, decreased ROM and decreased joint mobility  Due to these impairments, Patient has difficulty performing a/iadls, recreational activities and work-related activities  Patient would benefit from skilled physical therapy to address the impairments, improve their level of function, and to improve their overall quality of life      Impairments: abnormal or restricted ROM, activity intolerance, impaired physical strength, lacks appropriate home exercise program, pain with function and scapular dyskinesis    Symptom irritability: moderateUnderstanding of Dx/Px/POC: good   Prognosis: good    Goals  Short Term Goals: to be achieved by 4 weeks  1) Patient to be independent with basic HEP  2) Decrease pain to 4/10 at its worst   3) Increase shoulder ROM by 5-10 degrees in all planes  4) Increase shoulder strength by 1/2 MMT grade in all deficient planes  Long Term Goals: to be achieved by discharge  1) FOTO equal to or greater than target score indicating improvements with overall function  2) Patient to be independent with comprehensive HEP  3) Patient will demonstrate functional reaching with limited pain in order to participate in daily activities  4) Increase UE strength to 4+/5 MMT grade in all planes to improve a/iadls  5) Patient to report no sleep interruption secondary to pain  Plan  Plan details: 2x week for 4-6 weeks   Patient would benefit from: PT eval and skilled physical therapy  Planned modality interventions: low level laser therapy  Planned therapy interventions: manual therapy, neuromuscular re-education, patient education, therapeutic activities, therapeutic exercise and home exercise program  Treatment plan discussed with: patient        Subjective Evaluation    History of Present Illness  Mechanism of injury: History of Current Injury: Patient reports that she has had to falls in the last few months secondary to trip over baby gate in her home  Patient reports that she had mild pain after the first but did not seek medical help and it resolved on its own  However, in June patient fell again on the same shoulder and had significant pain  Patient did not hear an audible pop but did have swelling in the clavicle region for a short time after  Patient waited a few weeks to see if it would resolve however did not have relief in symptoms   Patient received MRI at the doctors a week or two ago which showed RTC tears  Patient wants to try conservative treatment first secondary to not wanting surgery  Pain location/Descriptors: Patient reports lateral >anterior shoulder pain  Patient reports a sharp/shooting pain that is intermittent but aggravated by positions and certain movements  Aggravating factors: sleeping on the L side, lifting, lowering the arm from an elevated position, turning the steering wheel, putting bra on in the back, reaching behind  Easing factors: changing position when sleeping, Aleeve, sleeping better with tramadole, ice   24 HR pattern: No changes   Imaging: MRI 2020  1  Severe supraspinatus and infraspinatus tendinosis with high-grade articular surface supraspinatus tear  2  Intrasubstance subscapularis tear  3  Mild long head biceps tendinosis  4  Mild subacromion subdeltoid bursitis  Special Questions: Patient denies radiating pain, numbness, or tingling  Sleep disturbance but able to fall back to sleep with position change  Patient goals: Patient reports to alleviate the pain and get her back to her recreational activities  Hobbies/Interest: swimming, water exercises, water  at the [facility]  Occupation: retired teacher     Pain  Current pain ratin  At best pain ratin  At worst pain ratin  Location: L anteriolateral shoulder   Quality: sharp  Relieving factors: ice, medications and change in position  Aggravating factors: overhead activity and lifting    Patient Goals  Patient goals for therapy: increased strength, return to sport/leisure activities and decreased pain  Patient goal: Patient reports to alleviate the pain and get her back to her recreational activities  Objective     Postural Observations    Additional Postural Observation Details  Rounded shoulders      Cervical/Thoracic Screen   Cervical range of motion within normal limits    Active Range of Motion   Left Shoulder   Flexion: 165 degrees with pain  Extension: 60 degrees Abduction: 100 degrees with pain  External rotation 0°: 45 degrees   External rotation 45°: 62 degrees with pain  External rotation 90°: 50 degrees with pain  Internal rotation 45°: 90 degrees   Internal rotation 90°: 80 degrees     Right Shoulder   Normal active range of motion    Passive Range of Motion   Left Shoulder   Flexion: 175 degrees with pain  Abduction: 155 degrees with pain    Right Shoulder   Normal passive range of motion    Strength/Myotome Testing     Left Shoulder     Planes of Motion   Flexion: 4   Extension: 4+   Abduction: 4   External rotation at 0°: 4   Internal rotation at 0°: 4     Isolated Muscles   Infraspinatus: 4   Lower trapezius: 4   Middle trapezius: 4   Rhomboids: 4   Serratus anterior: 4   Subscapularis: 4   Supraspinatus: 4   Teres minor: 4     Right Shoulder   Normal muscle strength    Additional Strength Details  Pain with all motions  Flex, abd, and ER the most     Tests     Left Shoulder   Positive empty can, full can, Hawkin's and painful arc                Precautions: Depression, anxiety, fibromyalgia, hx of cancer, thyroid disorder, GERD       Manuals             AP GH mobs             Inf mobs              Scapular mobs              PROM             Neuro Re-Ed             Ys, Ts, Is             Body blade                                                                                Ther Ex             Resisted Rows             Resisted Extensions             Resisted IR             Resisted ER             Sidelying ER             No moneys              Wall slides             Serratus punches              LAT/FWD raises              Shoulder press             Lat pull downs                           Arm bike or pullies              Ther Activity                                       Gait Training                                       HEP              Scapular retractions              ER isometric              IR isometric

## 2020-08-06 ENCOUNTER — OFFICE VISIT (OUTPATIENT)
Dept: PHYSICAL THERAPY | Facility: CLINIC | Age: 65
End: 2020-08-06
Payer: COMMERCIAL

## 2020-08-06 DIAGNOSIS — M12.812 ROTATOR CUFF TEAR ARTHROPATHY OF LEFT SHOULDER: Primary | ICD-10-CM

## 2020-08-06 DIAGNOSIS — M75.102 ROTATOR CUFF TEAR ARTHROPATHY OF LEFT SHOULDER: Primary | ICD-10-CM

## 2020-08-06 PROCEDURE — 97110 THERAPEUTIC EXERCISES: CPT | Performed by: PHYSICAL THERAPIST

## 2020-08-06 PROCEDURE — 97112 NEUROMUSCULAR REEDUCATION: CPT | Performed by: PHYSICAL THERAPIST

## 2020-08-06 PROCEDURE — 97140 MANUAL THERAPY 1/> REGIONS: CPT | Performed by: PHYSICAL THERAPIST

## 2020-08-06 NOTE — PROGRESS NOTES
Daily Note     Today's date: 2020  Patient name: Faith Buckley  : 1955  MRN: [id number]  Referring provider: Melissa Morales MD  Dx:   Encounter Diagnosis     ICD-10-CM    1  Rotator cuff tear arthropathy of left shoulder  M75 102     M12 812        Start Time: 1530  Stop Time: 1615  Total time in clinic (min): 45 minutes    Subjective: Patient notes the most pain is in the lateral shoulder  PAtient notes performing HEP and had no questions  Objective: See treatment diary below      Assessment: Patient tolerated treatment well  Patient responded well to the introduction of exercise program this date  Patient had mild to moderate pain throughout exercises  Patient had the most discomfort with shoulder abduction and external rotation  Patient noted decreased pain and discomfort post manual treatment  Patient fatigue post session  Patient would benefit from continued PT      Plan: Continue per plan of care        Precautions: Depression, anxiety, fibromyalgia, hx of cancer, thyroid disorder, GERD       Manuals             AP GH mobs 5'            Inf mobs  5'            Scapular mobs              PROM             Neuro Re-Ed             Ys, Ts, Is             Body blade  2x30" ea way                                                                               Ther Ex             Resisted Rows 2x10 RTB            Resisted Extensions 2x10 RTB            Resisted IR 2x10 RTB            Resisted ER 2x10 RTB            Sidelying ER             No moneys  2x10 RTB            Wall slides With towel   20x             Serratus punches              LAT/FWD raises              Shoulder press             Lat pull downs              Pball rotations  2x10 CW and CCW                         Arm bike or pullies  2'/2'            Ther Activity                                       Gait Training                                       HEP              Scapular retractions              ER isometric              IR isometric

## 2020-08-11 ENCOUNTER — OFFICE VISIT (OUTPATIENT)
Dept: PHYSICAL THERAPY | Facility: CLINIC | Age: 65
End: 2020-08-11
Payer: COMMERCIAL

## 2020-08-11 DIAGNOSIS — M12.812 ROTATOR CUFF TEAR ARTHROPATHY OF LEFT SHOULDER: Primary | ICD-10-CM

## 2020-08-11 DIAGNOSIS — M75.102 ROTATOR CUFF TEAR ARTHROPATHY OF LEFT SHOULDER: Primary | ICD-10-CM

## 2020-08-11 PROCEDURE — 97140 MANUAL THERAPY 1/> REGIONS: CPT | Performed by: PHYSICAL THERAPIST

## 2020-08-11 PROCEDURE — 97112 NEUROMUSCULAR REEDUCATION: CPT | Performed by: PHYSICAL THERAPIST

## 2020-08-11 PROCEDURE — 97110 THERAPEUTIC EXERCISES: CPT | Performed by: PHYSICAL THERAPIST

## 2020-08-11 NOTE — PROGRESS NOTES
Daily Note     Today's date: 2020  Patient name: Agnieszka Nath  : 1955  MRN: [id number]  Referring provider: Pilar Blanco MD  Dx:   Encounter Diagnosis     ICD-10-CM    1  Rotator cuff tear arthropathy of left shoulder  M75 102     M12 812        Start Time: 730  Stop Time: 0815  Total time in clinic (min): 45 minutes    Subjective: Patient notes the most pain is in the lateral shoulder  Patient reports feeling good after last session with minimal soreness  Objective: See treatment diary below      Assessment: Patient tolerated treatment well  Patient responded well to progressions and new exercises  Patient had mild to moderate pain throughout exercises especially with shoulder abduction and external rotation  Patient also complains of pain in the UT secondary to increased compensation of this muscle in order to aid in promoting more shoulder elevation  This indicates weakness and pain in the RTC and the need for strengthening in both the RTC and scapular stabilizers  Patient noted decreased pain and discomfort post manual treatment  Patient fatigue post session  Patient would benefit from continued PT      Plan: Continue per plan of care        Precautions: Depression, anxiety, fibromyalgia, hx of cancer, thyroid disorder, GERD       Manuals 8/6 8/10           AP GH mobs 5' 2 5'           Inf mobs  5' 2 5'           Scapular mobs   5'           PROM             Neuro Re-Ed             Ys, Ts, Is  NV           Body blade  2x30" ea way  2x30" ea way                                                                              Ther Ex             Resisted Rows 2x10 RTB 2x10 GTB           Resisted Extensions 2x10 RTB 2x10 GTB           Resisted IR 2x10 RTB 2x10 RTB           Resisted ER 2x10 RTB 2x10 RTB           Sidelying ER             No moneys  2x10 RTB 3x10 RTB           Wall slides With towel   20x  With towel   20x            Serratus punches   TB NV           LAT/FWD raises Shoulder press             Lat pull downs              Pball rotations  2x10 CW and CCW            [place] Sweeps   3x10 10#           Arm bike or pullies  2'/2' 2'/2'           Shoulder abduction in and outs on wall with TB  2x10 YTB           Ther Activity                                       Gait Training                                       HEP              Scapular retractions              ER isometric              IR isometric

## 2020-08-13 ENCOUNTER — OFFICE VISIT (OUTPATIENT)
Dept: PHYSICAL THERAPY | Facility: CLINIC | Age: 65
End: 2020-08-13
Payer: COMMERCIAL

## 2020-08-13 DIAGNOSIS — M75.102 ROTATOR CUFF TEAR ARTHROPATHY OF LEFT SHOULDER: Primary | ICD-10-CM

## 2020-08-13 DIAGNOSIS — M12.812 ROTATOR CUFF TEAR ARTHROPATHY OF LEFT SHOULDER: Primary | ICD-10-CM

## 2020-08-13 PROCEDURE — 97112 NEUROMUSCULAR REEDUCATION: CPT | Performed by: PHYSICAL THERAPIST

## 2020-08-13 PROCEDURE — 97110 THERAPEUTIC EXERCISES: CPT | Performed by: PHYSICAL THERAPIST

## 2020-08-13 PROCEDURE — 97140 MANUAL THERAPY 1/> REGIONS: CPT | Performed by: PHYSICAL THERAPIST

## 2020-08-13 NOTE — PROGRESS NOTES
Daily Note     Today's date: 2020  Patient name: Abdiel Ramos  : 1955  MRN: [id number]  Referring provider: Chaya Kanner, MD  Dx:   Encounter Diagnosis     ICD-10-CM    1  Rotator cuff tear arthropathy of left shoulder  M75 102     M12 812        Start Time: 0845  Stop Time: 8008  Total time in clinic (min): 40 minutes    Subjective: Patient notes that she felt really good after last session and had a good two days  Patient notes that she has a little more soreness this morning  Patient also reports that the upper trap area is better after doing the stretch given last visit  Objective: See treatment diary below      Assessment: Patient tolerated treatment well  Patient had decreased complaints of pain this date throughout exercises even with increased pain levels this morning  Patient demonstrated improvements with cane exercises by gaining more motion to end range as well as decreased pain  During manual treatment, trigger point and increased muscle tension was felt in the upper trap area  Patient noted decreased pain and discomfort post manual treatment  Patient fatigue post session  Patient would benefit from continued PT      Plan: Continue per plan of care        Precautions: Depression, anxiety, fibromyalgia, hx of cancer, thyroid disorder, GERD       Manuals 8/6 8/10 8/13          AP GH mobs 5' 2 5' 2 5'          Inf mobs  5' 2 5' 2 5'          Scapular mobs   5' 5'          PROM             Neuro Re-Ed             Ys, Ts, Is  NV           Body blade  2x30" ea way  2x30" ea way  2x30" ea way                                                                             Ther Ex             Resisted Rows 2x10 RTB 2x10 GTB 2x10 GTB          Resisted Extensions 2x10 RTB 2x10 GTB 2x10 GTB          Resisted IR 2x10 RTB 2x10 RTB 2x10 RTB          Resisted ER 2x10 RTB 2x10 RTB 2x10 RTB          Sidelying ER             No moneys  2x10 RTB 3x10 RTB 3x10 RTB          Wall slides With towel   20x With towel   20x  With towel   20x           Serratus punches   TB NV 2x10 2#          Cane exercises   Flex/[id number]"           Shoulder press             Lat pull downs              Pball rotations  2x10 CW and CCW            [place] Sweeps   3x10 10# 3x10 15#          Arm bike or pullies  2'/2' 2'/2' 2'/2'          Shoulder abduction in and outs on wall with TB  2x10 YTB 2x10 YTB          Bicep curls    3x10 4#          Ther Activity                                       Gait Training                                       HEP              Scapular retractions              ER isometric              IR isometric

## 2020-08-18 ENCOUNTER — OFFICE VISIT (OUTPATIENT)
Dept: PHYSICAL THERAPY | Facility: CLINIC | Age: 65
End: 2020-08-18
Payer: COMMERCIAL

## 2020-08-18 DIAGNOSIS — M12.812 ROTATOR CUFF TEAR ARTHROPATHY OF LEFT SHOULDER: Primary | ICD-10-CM

## 2020-08-18 DIAGNOSIS — M75.102 ROTATOR CUFF TEAR ARTHROPATHY OF LEFT SHOULDER: Primary | ICD-10-CM

## 2020-08-18 PROCEDURE — 97110 THERAPEUTIC EXERCISES: CPT | Performed by: PHYSICAL THERAPIST

## 2020-08-18 PROCEDURE — 97140 MANUAL THERAPY 1/> REGIONS: CPT | Performed by: PHYSICAL THERAPIST

## 2020-08-18 PROCEDURE — 97112 NEUROMUSCULAR REEDUCATION: CPT | Performed by: PHYSICAL THERAPIST

## 2020-08-18 NOTE — PROGRESS NOTES
Daily Note     Today's date: 2020  Patient name: Agnieszka Nath  : 1955  MRN: [id number]  Referring provider: Pilar Blanco MD  Dx:   Encounter Diagnosis     ICD-10-CM    1  Rotator cuff tear arthropathy of left shoulder  M75 102     M12 812        Start Time: 0815  Stop Time: 0900  Total time in clinic (min): 45 minutes    Subjective: Patient notes that she had some soreness but its feeling a little better  Patient reports that she is having upper trap and upper scapular pain  Objective: See treatment diary below      Assessment: Patient tolerated treatment well  Patient required verbal and tactile cueing for proper scapular retraction and upper trap inhibition throughout prone Ts this date  Patient has trouble activating the scapular musculature secondary to weakness which causes the UTs to compensate  This compensation is contributing to the tightness and trigger points throughout this area  Patient noted decreased pain and discomfort post manual treatment  Patient fatigue post session  Patient would benefit from continued PT      Plan: Continue per plan of care        Precautions: Depression, anxiety, fibromyalgia, hx of cancer, thyroid disorder, GERD       Manuals 8/6 8/10 8/13 8/17         AP GH mobs 5' 2 5' 2 5' 5'         Inf mobs  5' 2 5' 2 5'          Scapular mobs   5' 5' 5' with STM          PROM             Neuro Re-Ed             Ys, Ts, Is  NV  Prone 10x each          Body blade  2x30" ea way  2x30" ea way  2x30" ea way  2x30" ea way                                                                            Ther Ex             Resisted Rows 2x10 RTB 2x10 GTB 2x10 GTB 3x10 BTB         Resisted Extensions 2x10 RTB 2x10 GTB 2x10 GTB 3x10 BTB         Resisted IR 2x10 RTB 2x10 RTB 2x10 RTB 3x10 GTB         Resisted ER 2x10 RTB 2x10 RTB 2x10 RTB 3x10 GTB         Sidelying ER    10x no #         No moneys  2x10 RTB 3x10 RTB 3x10 RTB 3x10 RTB         Wall slides With towel   20x  With towel   20x  With towel   20x  With towel   10x          Serratus punches   TB NV 2x10 2#          Cane exercises   Flex/[id number]"           Shoulder IR stretch     10x10"         Lat pull downs              Pball rotations  2x10 CW and CCW            [place] Sweeps   3x10 10# 3x10 15#          Arm bike or pullies  2'/2' 2'/2' 2'/2' Pullies 5 mins         Shoulder abduction in and outs on wall with TB  2x10 YTB 2x10 YTB          Bicep curls    3x10 4#          Ther Activity                                       Gait Training                                       HEP              Scapular retractions              ER isometric              IR isometric

## 2020-08-20 ENCOUNTER — OFFICE VISIT (OUTPATIENT)
Dept: PHYSICAL THERAPY | Facility: CLINIC | Age: 65
End: 2020-08-20
Payer: COMMERCIAL

## 2020-08-20 DIAGNOSIS — M12.812 ROTATOR CUFF TEAR ARTHROPATHY OF LEFT SHOULDER: Primary | ICD-10-CM

## 2020-08-20 DIAGNOSIS — M75.102 ROTATOR CUFF TEAR ARTHROPATHY OF LEFT SHOULDER: Primary | ICD-10-CM

## 2020-08-20 PROCEDURE — 97110 THERAPEUTIC EXERCISES: CPT | Performed by: PHYSICAL THERAPIST

## 2020-08-20 PROCEDURE — 97140 MANUAL THERAPY 1/> REGIONS: CPT | Performed by: PHYSICAL THERAPIST

## 2020-08-20 PROCEDURE — 97112 NEUROMUSCULAR REEDUCATION: CPT | Performed by: PHYSICAL THERAPIST

## 2020-08-20 NOTE — PROGRESS NOTES
Daily Note     Today's date: 2020  Patient name: Arelis Suarez  : 1955  MRN: [id number]  Referring provider: Kalyani Vo MD  Dx:   Encounter Diagnosis     ICD-10-CM    1  Rotator cuff tear arthropathy of left shoulder  M75 102     M12 812        Start Time: 845  Stop Time: 930  Total time in clinic (min): 45 minutes    Subjective: Patient notes that she felt great after last session  Patient notes that she is having a good morning with limited pain  Patient notes that the pain is not as constant as it was before and she fells stronger  Objective: See treatment diary below      Assessment: Patient tolerated treatment well  Patient demonstrated improved technique with scapular strengthening exercises by requiring decreased verbal and tactile cueing this date indicating good carry over from previous sessions  Patient demontrated decreased need for rest breaks between sets indicating improvements with shoulder strength  Patient had limited complaints of pain throughout session  Patient noted decreased pain and discomfort post manual treatment  Patient would benefit from continued PT      Plan: Continue per plan of care        Precautions: Depression, anxiety, fibromyalgia, hx of cancer, thyroid disorder, GERD       Manuals 8/6 8/10 8/13 8/17 8/20        AP GH mobs 5' 2 5' 2 5' 5'         Inf mobs  5' 2 5' 2 5'          Scapular mobs   5' 5' 5' with STM          PROM             Neuro Re-Ed             Ys, Ts, Is  NV  Prone 10x each  Prone 10x each         Body blade  2x30" ea way  2x30" ea way  2x30" ea way  2x30" ea way  2x30" ea way                                                                           Ther Ex             Resisted Rows 2x10 RTB 2x10 GTB 2x10 GTB 3x10 BTB 3x10 BTB        Resisted Extensions 2x10 RTB 2x10 GTB 2x10 GTB 3x10 BTB 3x10 BTB        Resisted IR 2x10 RTB 2x10 RTB 2x10 RTB 3x10 GTB 3x10 GTB        Resisted ER 2x10 RTB 2x10 RTB 2x10 RTB 3x10 GTB 3x10 GTB Sidelying ER    10x no #         No moneys  2x10 RTB 3x10 RTB 3x10 RTB 3x10 RTB 3x10 GTB        Wall slides With towel   20x  With towel   20x  With towel   20x  With towel   10x  With towel   10x         Serratus punches   TB NV 2x10 2#          Cane exercises   Flex/[id number]"   Flex/xbm90m30"         Shoulder IR stretch     10x10"         Lat pull downs              Pball rotations  2x10 CW and CCW            [place] Sweeps   3x10 10# 3x10 15# 3x10 15# 3x10 15#        Arm bike or pullies  2'/2' 2'/2' 2'/2' 3'/3' 3'/3'        Shoulder abduction in and outs on wall with TB  2x10 YTB 2x10 YTB 2x10 YTB 2x10 YTB        Bicep curls    3x10 4#          Ther Activity                                       Gait Training                                       HEP              Scapular retractions              ER isometric              IR isometric

## 2020-08-25 ENCOUNTER — APPOINTMENT (OUTPATIENT)
Dept: PHYSICAL THERAPY | Facility: CLINIC | Age: 65
End: 2020-08-25
Payer: COMMERCIAL

## 2020-08-27 ENCOUNTER — OFFICE VISIT (OUTPATIENT)
Dept: PHYSICAL THERAPY | Facility: CLINIC | Age: 65
End: 2020-08-27
Payer: COMMERCIAL

## 2020-08-27 DIAGNOSIS — M75.102 ROTATOR CUFF TEAR ARTHROPATHY OF LEFT SHOULDER: Primary | ICD-10-CM

## 2020-08-27 DIAGNOSIS — M12.812 ROTATOR CUFF TEAR ARTHROPATHY OF LEFT SHOULDER: Primary | ICD-10-CM

## 2020-08-27 PROCEDURE — 97110 THERAPEUTIC EXERCISES: CPT | Performed by: PHYSICAL THERAPIST

## 2020-08-27 PROCEDURE — 97112 NEUROMUSCULAR REEDUCATION: CPT | Performed by: PHYSICAL THERAPIST

## 2020-08-27 PROCEDURE — 97140 MANUAL THERAPY 1/> REGIONS: CPT | Performed by: PHYSICAL THERAPIST

## 2020-08-27 NOTE — PROGRESS NOTES
Daily Note     Today's date: 2020  Patient name: David Rosa  : 1955  MRN: [id number]  Referring provider: Cecily Jaeger MD  Dx:   Encounter Diagnosis     ICD-10-CM    1  Rotator cuff tear arthropathy of left shoulder  M75 102     M12 812        Start Time: 0845  Stop Time: 09  Total time in clinic (min): 45 minutes    Subjective: Patient notes that she has been a little sore this week  Objective: See treatment diary below      Assessment: Patient tolerated treatment well  Patient demonstrated improved scapular control with prone Ts this date but requiring less verbal and tactile cueing  Patient had limited complaints of pain throughout session  Patient continues to have trigger points and muscle tension in the UT secondary to slight UT compensation during overhead shoulder movements  Patient noted decreased pain and discomfort post manual treatment  Patient would benefit from continued PT      Plan: Continue per plan of care        Precautions: Depression, anxiety, fibromyalgia, hx of cancer, thyroid disorder, GERD       Manuals 8/6 8/10 8/13 8/17 8/20 8/27       AP GH mobs 5' 2 5' 2 5' 5'  5'       Inf mobs  5' 2 5' 2 5'          Scapular mobs   5' 5' 5' with STM   5' with STM        PROM             Neuro Re-Ed             Ys, Ts, Is  NV  Prone 10x each  Prone 10x each  Prone 10x each        Body blade  2x30" ea way  2x30" ea way  2x30" ea way  2x30" ea way  2x30" ea way                                                                           Ther Ex             Resisted Rows 2x10 RTB 2x10 GTB 2x10 GTB 3x10 BTB 3x10 BTB 3x10 Blk TB       Resisted Extensions 2x10 RTB 2x10 GTB 2x10 GTB 3x10 BTB 3x10 BTB 3x10 Blk TB       Resisted IR 2x10 RTB 2x10 RTB 2x10 RTB 3x10 GTB 3x10 GTB 3x10 GTB       Resisted ER 2x10 RTB 2x10 RTB 2x10 RTB 3x10 GTB 3x10 GTB 3x10 GTB       Sidelying ER    10x no #         No moneys  2x10 RTB 3x10 RTB 3x10 RTB 3x10 RTB 3x10 GTB        Wall slides With towel   20x With towel   20x  With towel   20x  With towel   10x  With towel   10x  With towel   10x        Serratus punches   TB NV 2x10 2#          Cane exercises   Flex/[id number]"   Flex/uwu56s23"  Flex/fsd92i91"       Shoulder IR stretch     10x10"         Lat pull downs              Pball rotations  2x10 CW and CCW            Devika Sweeps   3x10 10# 3x10 15# 3x10 15# 3x10 15# 3x10 15#       Arm bike or pullies  2'/2' 2'/2' 2'/2' 3'/3' 3'/3' 3'/3'       Shoulder abduction in and outs on wall with TB  2x10 YTB 2x10 YTB 2x10 YTB 2x10 YTB 2x10 YTB       Bicep curls    3x10 4#          Ther Activity                                       Gait Training                                       HEP              Scapular retractions              ER isometric              IR isometric

## 2020-09-01 ENCOUNTER — EVALUATION (OUTPATIENT)
Dept: PHYSICAL THERAPY | Facility: CLINIC | Age: 65
End: 2020-09-01
Payer: COMMERCIAL

## 2020-09-01 DIAGNOSIS — M12.812 ROTATOR CUFF TEAR ARTHROPATHY OF LEFT SHOULDER: Primary | ICD-10-CM

## 2020-09-01 DIAGNOSIS — M75.102 ROTATOR CUFF TEAR ARTHROPATHY OF LEFT SHOULDER: Primary | ICD-10-CM

## 2020-09-01 PROCEDURE — 97140 MANUAL THERAPY 1/> REGIONS: CPT | Performed by: PHYSICAL THERAPIST

## 2020-09-01 PROCEDURE — 97112 NEUROMUSCULAR REEDUCATION: CPT | Performed by: PHYSICAL THERAPIST

## 2020-09-01 PROCEDURE — 97110 THERAPEUTIC EXERCISES: CPT | Performed by: PHYSICAL THERAPIST

## 2020-09-01 NOTE — PROGRESS NOTES
PT Re-Evaluation    Today's date: 2020  Patient name: Miles Aguilar  : 1955   MRN: [id number]  Referring provider: Honey Chew MD  Dx:   Encounter Diagnosis     ICD-10-CM    1  Rotator cuff tear arthropathy of left shoulder  M75 102     M12 812        Start Time: 730  Stop Time: 0815  Total time in clinic (min): 45 minutes    Assessment  Assessment details: Luisa Gates 2020:  Patient has completed 8 visits thus far in this episode of care for  referring diagnosis of RTC tear and tendinopathies  Patient notes that they feel the are 75% improved since the start of therapy  Patient reports that she has seen improvements with lifting grocery bags, reaching overhead for shelves, and less pain throughout the course of the day  Patient reports that the pain is no longer constant and is more intermittent now  Patient still has pain at night but only when she goes to roll over  Patient notes that she still has the most with activities reaching backwards such as putting on a bra or reaching for something in the backseat  Upon evaluation, patient demonstrated good improvements with shoulder ROM specifically in flexion and abduction  Patient continues to be limited in strength testing secondary to pain as well as ER ROM at 45 and 90 degrees  Patient scored a 64 on FOTO indicating improvements with overall function  Patient has met all of her short term goals and is progressing appropriately toward long term goals  Patient would benefit from continued skilled physical therapy to address the impairments, improve their level of function, and to improve their overall quality of life        Impairments: abnormal or restricted ROM, activity intolerance, impaired physical strength, lacks appropriate home exercise program, pain with function and scapular dyskinesis    Symptom irritability: moderateUnderstanding of Dx/Px/POC: good   Prognosis: good    Goals  Short Term Goals: to be achieved by 4 weeks  1) Patient to be independent with basic HEP  MET  2) Decrease pain to 4/10 at its worst  PARTIALLY MET   3) Increase shoulder ROM by 5-10 degrees in all planes MET  4) Increase shoulder strength by 1/2 MMT grade in all deficient planes  MET    Long Term Goals: to be achieved by discharge  1) FOTO equal to or greater than target score indicating improvements with overall function  PARTIALLY MET   2) Patient to be independent with comprehensive HEP  PARTIALLY MET   3) Patient will demonstrate functional reaching with limited pain in order to participate in daily activities  PARTIALLY MET   4) Increase UE strength to 4+/5 MMT grade in all planes to improve a/iadls  PARTIALLY MET   5) Patient to report no sleep interruption secondary to pain  PARTIALLY MET          Plan  Plan details: UPDATED POC 2020: 2x week for 3-4 weeks   Patient would benefit from: PT eval and skilled physical therapy  Planned modality interventions: low level laser therapy  Planned therapy interventions: manual therapy, neuromuscular re-education, patient education, therapeutic activities, therapeutic exercise and home exercise program  Treatment plan discussed with: patient        Subjective Evaluation    History of Present Illness  Mechanism of injury: RE-Eval 2020: Patient notes that they feel the are 75% improved since the start of therapy  Patient reports that she has seen improvements with lifting grocery bags, reaching overhead for shelves, and less pain throughout the course of the day  Patient notes that she still has the most with activities reaching backwards such as putting on a bra or reaching for something in the backseat       Pain  Current pain ratin  At best pain ratin  At worst pain ratin  Location: L anteriolateral shoulder   Quality: sharp  Relieving factors: ice, medications and change in position  Aggravating factors: overhead activity and lifting    Patient Goals  Patient goals for therapy: increased strength, return to sport/leisure activities and decreased pain  Patient goal: Patient reports to alleviate the pain and get her back to her recreational activities  Objective     Postural Observations    Additional Postural Observation Details  Rounded shoulders  Cervical/Thoracic Screen   Cervical range of motion within normal limits    Active Range of Motion   Left Shoulder   Flexion: 170 degrees with pain  Extension: 60 degrees   Abduction: 170 degrees with pain  External rotation 45°: 62 degrees with pain  External rotation 90°: 50 degrees with pain  Internal rotation 45°: 90 degrees   Internal rotation 90°: 90 degrees     Right Shoulder   Normal active range of motion    Additional Active Range of Motion Details  Pain on the way down  Passive Range of Motion     Right Shoulder   Normal passive range of motion    Strength/Myotome Testing     Left Shoulder     Planes of Motion   Flexion: 4+   Extension: 4+   Abduction: 4   External rotation at 0°: 4   Internal rotation at 0°: 4     Isolated Muscles   Infraspinatus: 4   Lower trapezius: 4+   Middle trapezius: 4+   Rhomboids: 4+   Serratus anterior: 4+   Subscapularis: 4+   Supraspinatus: 4   Teres minor: 4     Right Shoulder   Normal muscle strength    Additional Strength Details  Most pain with resisted abduction  Tests     Left Shoulder   Positive empty can, full can, Hawkin's and painful arc       Flowsheet Rows      Most Recent Value   PT/OT G-Codes   Current Score  61   Projected Score  71              Precautions: Depression, anxiety, fibromyalgia, hx of cancer, thyroid disorder, GERD   Manuals 8/6 8/10 8/13 8/17 8/20 8/27 9/1      AP GH mobs 5' 2 5' 2 5' 5'  5' 10'      Inf mobs  5' 2 5' 2 5'          Scapular mobs   5' 5' 5' with STM   5' with STM        PROM             Neuro Re-Ed             Ys, Ts, Is  NV  Prone 10x each  Prone 10x each  Prone 10x each  Prone 10x each       Body blade  2x30" ea way  2x30" ea way  2x30" ea way  2x30" ea way  2x30" ea way Ther Ex             Resisted Rows 2x10 RTB 2x10 GTB 2x10 GTB 3x10 BTB 3x10 BTB 3x10 Blk TB       Resisted Extensions 2x10 RTB 2x10 GTB 2x10 GTB 3x10 BTB 3x10 BTB 3x10 Blk TB       Resisted IR 2x10 RTB 2x10 RTB 2x10 RTB 3x10 GTB 3x10 GTB 3x10 GTB 3x10 BTB      Resisted ER 2x10 RTB 2x10 RTB 2x10 RTB 3x10 GTB 3x10 GTB 3x10 GTB 3x10 BTB      Sidelying ER    10x no #         No moneys  2x10 RTB 3x10 RTB 3x10 RTB 3x10 RTB 3x10 GTB        Wall slides With towel   20x  With towel   20x  With towel   20x  With towel   10x  With towel   10x  With towel   10x  With towel   10x       Serratus punches   TB NV 2x10 2#          Cane exercises   Flex/wyi59v93"   Flex/xeu81w22"  Flex/qvy52e75"       Shoulder IR stretch     10x10"         Lat pull downs              Pball rotations  2x10 CW and CCW            Devika Sweeps   3x10 10# 3x10 15# 3x10 15# 3x10 15# 3x10 15#       Arm bike or pullies  2'/2' 2'/2' 2'/2' 3'/3' 3'/3' 3'/3'       Shoulder abduction in and outs on wall with TB  2x10 YTB 2x10 YTB 2x10 YTB 2x10 YTB 2x10 YTB       Bicep curls    3x10 4#          Ther Activity                                       Gait Training                                       HEP              Scapular retractions              ER isometric              IR isometric

## 2020-09-03 ENCOUNTER — OFFICE VISIT (OUTPATIENT)
Dept: PHYSICAL THERAPY | Facility: CLINIC | Age: 65
End: 2020-09-03
Payer: COMMERCIAL

## 2020-09-03 DIAGNOSIS — M12.812 ROTATOR CUFF TEAR ARTHROPATHY OF LEFT SHOULDER: Primary | ICD-10-CM

## 2020-09-03 DIAGNOSIS — M75.102 ROTATOR CUFF TEAR ARTHROPATHY OF LEFT SHOULDER: Primary | ICD-10-CM

## 2020-09-03 PROCEDURE — 97110 THERAPEUTIC EXERCISES: CPT | Performed by: PHYSICAL THERAPIST

## 2020-09-03 PROCEDURE — 97140 MANUAL THERAPY 1/> REGIONS: CPT | Performed by: PHYSICAL THERAPIST

## 2020-09-03 PROCEDURE — 97112 NEUROMUSCULAR REEDUCATION: CPT | Performed by: PHYSICAL THERAPIST

## 2020-09-03 NOTE — PROGRESS NOTES
Daily Note     Today's date: 9/3/2020  Patient name: Luzmaria Velasquez  : 1955  MRN: [id number]  Referring provider: Viki Galvan MD  Dx:   Encounter Diagnosis     ICD-10-CM    1  Rotator cuff tear arthropathy of left shoulder  M75 102     M12 812        Start Time: 0800  Stop Time: 0845  Total time in clinic (min): 45 minutes    Subjective: Patient notes that she has been doing okay  Objective: See treatment diary below      Assessment: Patient tolerated treatment well  Patient demonstrated improved scapular control with prone Ts this date with requiring no verbal or tactile cueing in order to perform correct technique  Patient continues to show slight UT compensation with more over head movements  Patient continues to have trigger points and muscle tension  Patient noted decreased pain and discomfort post manual treatment  Patient would benefit from continued PT      Plan: Continue per plan of care        Precautions: Depression, anxiety, fibromyalgia, hx of cancer, thyroid disorder, GERD       Manuals 8/6 8/10 8/13 8/17 8/20 8/27 9/3      AP GH mobs 5' 2 5' 2 5' 5'  5' 5'      Inf mobs  5' 2 5' 2 5'          Scapular mobs   5' 5' 5' with STM   5' with STM  5' with STM       PROM             Neuro Re-Ed             Ys, Ts, Is  NV  Prone 10x each  Prone 10x each  Prone 10x each        Body blade  2x30" ea way  2x30" ea way  2x30" ea way  2x30" ea way  2x30" ea way   2x15" ea way                                                                         Ther Ex             Resisted Rows 2x10 RTB 2x10 GTB 2x10 GTB 3x10 BTB 3x10 BTB 3x10 Blk TB 3x10 Blk TB      Resisted Extensions 2x10 RTB 2x10 GTB 2x10 GTB 3x10 BTB 3x10 BTB 3x10 Blk TB 3x10 Blk TB      Resisted IR 2x10 RTB 2x10 RTB 2x10 RTB 3x10 GTB 3x10 GTB 3x10 GTB 3x10 BTB      Resisted ER 2x10 RTB 2x10 RTB 2x10 RTB 3x10 GTB 3x10 GTB 3x10 GTB 3x10 BTB      Sidelying ER    10x no #         No moneys  2x10 RTB 3x10 RTB 3x10 RTB 3x10 RTB 3x10 GTB Wall slides With towel   20x  With towel   20x  With towel   20x  With towel   10x  With towel   10x  With towel   10x  With towel   10x       Serratus punches   TB NV 2x10 2#          Cane exercises   Flex/tuu38w25"   Flex/bma22p33"  Flex/ebz49s35" 10x10" ER only       Shoulder IR stretch     10x10"         Lat pull downs              Pball rotations  2x10 CW and CCW            [place] Sweeps   3x10 10# 3x10 15# 3x10 15# 3x10 15# 3x10 15# 3x10 15#      Arm bike or pullies  2'/2' 2'/2' 2'/2' 3'/3' 3'/3' 3'/3' Pullies 4 min       Shoulder abduction in and outs on wall with TB  2x10 YTB 2x10 YTB 2x10 YTB 2x10 YTB 2x10 YTB 2x10 YTB      Bicep curls    3x10 4#          Face pulls        2x10 GTB      Serratus punches        2x10 GTB      Ther Activity                                       Gait Training                                       HEP              Scapular retractions              ER isometric              IR isometric

## 2020-09-09 ENCOUNTER — OFFICE VISIT (OUTPATIENT)
Dept: OBGYN CLINIC | Facility: OTHER | Age: 65
End: 2020-09-09
Payer: COMMERCIAL

## 2020-09-09 VITALS
BODY MASS INDEX: 34.23 KG/M2 | WEIGHT: 213 LBS | TEMPERATURE: 97.3 F | HEIGHT: 66 IN | DIASTOLIC BLOOD PRESSURE: 72 MMHG | SYSTOLIC BLOOD PRESSURE: 109 MMHG | HEART RATE: 69 BPM

## 2020-09-09 DIAGNOSIS — M12.812 ROTATOR CUFF TEAR ARTHROPATHY OF LEFT SHOULDER: Primary | ICD-10-CM

## 2020-09-09 DIAGNOSIS — M75.102 ROTATOR CUFF TEAR ARTHROPATHY OF LEFT SHOULDER: Primary | ICD-10-CM

## 2020-09-09 PROCEDURE — 99213 OFFICE O/P EST LOW 20 MIN: CPT | Performed by: ORTHOPAEDIC SURGERY

## 2020-09-09 NOTE — PROGRESS NOTES
Orthopaedic Surgery - Office Note  Cesar Rojas (89 y o  female)   : 1955   MRN: [id number]  Encounter Date: 2020    Chief Complaint   Patient presents with    Left Shoulder - Follow-up       Assessment / Plan  Left shoulder partial supraspinatus and partial subscapularis tear    · The diagnosis and treatment options were reviewed  · The patient wishes to proceed with left shoulder arthroscopy with rotator cuff repair  · The risks, benefits, and alternatives were discussed  · Written consent was obtained  · Post op brace given in office today and instructed to bring day of surgery  · PT script given today  Return for 4-5 days after surgery   History of Present Illness  Cesar Rojas is a 62 y o  female who presents follow up of left shoulder partial supraspinatus and partial subscapularis tear  She has completed 5 weeks of physical therapy and is complaint with her HEP  She states that she feels an increase of overall strength and increase of overhead motion  However, she is unable to reach behind her, like putting clothing on and off,  due to serve pain  She is also having significant pain at night still  Today she is interested in receiving a CSI  She denies any further injury or trauma to her left shoulder  She denies any distal paresthesias  Review of Systems  Pertinent items are noted in HPI  All other systems were reviewed and are negative  Physical Exam  /72   Pulse 69   Temp (!) 97 3 °F (36 3 °C)   Ht 5' 6" (1 676 m)   Wt 96 6 kg (213 lb)   BMI 34 38 kg/m²   Cons: Appears well  No apparent distress  Psych: Alert  Oriented x3  Mood and affect normal   Eyes: PERRLA, EOMI  Resp: Normal effort  No audible wheezing or stridor  CV: Palpable pulse  No discernable arrhythmia  No LE edema  Lymph:  No palpable cervical, axillary, or inguinal lymphadenopathy  Skin: Warm  No palpable masses  No visible lesions  Neuro: Normal muscle tone    Normal and symmetric DTR's      Left Shoulder Exam  Alignment / Posture:  Normal shoulder posture  Inspection:  No swelling  Palpation:  No tenderness  ROM:  Shoulder   Shoulder ER 70  Shoulder IR hip  Strength:  Shoulder FE 4/5  ER 4/5  Stability:  No objective shoulder instability  Tests: (+) Marcial  (+) Belly press  (+) Lift off  Neurovascular:  Sensation intact in Ax/R/M/U nerve distributions  2+ DP & PT pulses  Studies Reviewed  I have personally reviewed pertinent films in PACS  MRI of left shoulder - reviewed imaging from 07/23/2020 which show high- grade articular surface supraspinatus tear, intrasubstance subscapularis tear    Procedures  No procedures today  Medical, Surgical, Family, and Social History  The patient's medical history, family history, and social history, were reviewed and updated as appropriate      Past Medical History:   Diagnosis Date    Anxiety     Closed fracture of distal radius and ulna, left, initial encounter     Last Assessed:  5/13/16    Depression     Disease of thyroid gland     Essential hypertension     Fibromyalgia     GERD (gastroesophageal reflux disease)     Sleep apnea     pt uses c-pap    Squamous cell carcinoma of skin     Last Assessed:  1/17/17       Past Surgical History:   Procedure Laterality Date    COLONOSCOPY N/A 7/14/2016    Procedure: COLONOSCOPY WITH ENDOCLIPS AND INJECTION FOR HEMOSTASIS;  Surgeon: Carmelita Arthur MD;  Location: [facility];  Service:     COLONOSCOPY  08/2019    MOHS RECONSTRUCTION      NASAL FRACTURE SURGERY      FL OPEN RX DISTAL RADIUS FX, INTRA-ARTICULAR, 3+ FRAG Left 2/25/2016    Procedure: OPEN REDUCTION W/ INTERNAL FIXATION (ORIF)DISTAL  RADIUS ;  Surgeon: Berto Palma MD;  Location: [facility];  Service: Orthopedics       Family History   Problem Relation Age of Onset    Cancer Mother         Breast    Breast cancer Mother     Colon cancer Mother     Cancer Father         Colon    Colon cancer Father Social History     Occupational History    Not on file   Tobacco Use    Smoking status: Former Smoker    Smokeless tobacco: Never Used   Substance and Sexual Activity    Alcohol use: No    Drug use: No    Sexual activity: Not Currently       Allergies   Allergen Reactions    Erythromycin Shortness Of Breath     Reaction Date: 20Oct2011;     Cefprozil      Reaction Date: 20Oct2011;     Cephalosporins          Current Outpatient Medications:     buPROPion (WELLBUTRIN XL) 300 mg 24 hr tablet, TAKE 1 TABLET BY MOUTH  DAILY, Disp: 90 tablet, Rfl: 3    Calcium Carb-Cholecalciferol (Calcium 500/D) 500-400 MG-UNIT CHEW, Chew 1 tablet daily, Disp: , Rfl:     levothyroxine 200 mcg tablet, TAKE 1 TABLET BY MOUTH  DAILY IN THE EARLY MORNING, Disp: 90 tablet, Rfl: 3    Magnesium 200 MG TABS, Take 1 tablet by mouth daily, Disp: , Rfl:     Multiple Vitamins-Minerals (MULTIVITAMIN ADULT PO), Take 1 tablet by mouth daily, Disp: , Rfl:     Omega-3 Fatty Acids (FISH OIL) 1000 MG CPDR, Take 1 capsule by mouth daily, Disp: , Rfl:     pravastatin (PRAVACHOL) 20 mg tablet, TAKE 1 TABLET BY MOUTH  DAILY, Disp: 90 tablet, Rfl: 3    traMADol (ULTRAM) 50 mg tablet, Take 1 tablet (50 mg total) by mouth every 6 (six) hours as needed for moderate pain, Disp: 40 tablet, Rfl: 0    vitamin E 100 UNIT capsule, Take 1 capsule by mouth daily, Disp: , Rfl:     FLUoxetine (PROzac) 20 mg capsule, Take 1 capsule (20 mg total) by mouth daily, Disp: 90 capsule, Rfl: 1    Current Facility-Administered Medications:     EPINEPHrine PF (ADRENALIN) 1 mg/mL injection 0 5 mg, 0 5 mg, Intramuscular, Once, Yolanda Williamson, [address] [facility]    Scribe Attestation    I,:   Caleb Wang am acting as a scribe while in the presence of the attending physician :        I,:   Ludwin Ford MD personally performed the services described in this documentation    as scribed in my presence :

## 2020-09-10 ENCOUNTER — APPOINTMENT (OUTPATIENT)
Dept: PHYSICAL THERAPY | Facility: CLINIC | Age: 65
End: 2020-09-10
Payer: COMMERCIAL

## 2020-09-15 ENCOUNTER — APPOINTMENT (OUTPATIENT)
Dept: PHYSICAL THERAPY | Facility: CLINIC | Age: 65
End: 2020-09-15
Payer: COMMERCIAL

## 2020-09-17 ENCOUNTER — APPOINTMENT (OUTPATIENT)
Dept: PHYSICAL THERAPY | Facility: CLINIC | Age: 65
End: 2020-09-17
Payer: COMMERCIAL

## 2020-09-18 ENCOUNTER — TELEPHONE (OUTPATIENT)
Dept: OBGYN CLINIC | Facility: HOSPITAL | Age: 65
End: 2020-09-18

## 2020-09-18 ENCOUNTER — TELEPHONE (OUTPATIENT)
Dept: OBGYN CLINIC | Facility: MEDICAL CENTER | Age: 65
End: 2020-09-18

## 2020-09-18 DIAGNOSIS — Z12.31 ENCOUNTER FOR SCREENING MAMMOGRAM FOR BREAST CANCER: ICD-10-CM

## 2020-09-18 NOTE — TELEPHONE ENCOUNTER
Please call patient to confirm her plans, there is a note from [facility] for a second opinion at which time she received a bursa injection

## 2020-09-18 NOTE — TELEPHONE ENCOUNTER
Patient sees Dr Wedny Zamorano  Patient is calling in wanting to cancel her surgery on 9/28 on her left shoulder  She is asking for a call back relating this           Call back# [phone]

## 2020-09-18 NOTE — TELEPHONE ENCOUNTER
Patient called phone room and was transferred to me to cancel her surgery and all appts afterwards  Had a second opinion and was injected

## 2020-10-05 ENCOUNTER — IMMUNIZATIONS (OUTPATIENT)
Dept: FAMILY MEDICINE CLINIC | Facility: CLINIC | Age: 65
End: 2020-10-05
Payer: COMMERCIAL

## 2020-10-05 DIAGNOSIS — Z23 NEED FOR INFLUENZA VACCINATION: Primary | ICD-10-CM

## 2020-10-05 PROCEDURE — 90471 IMMUNIZATION ADMIN: CPT

## 2020-10-05 PROCEDURE — 90682 RIV4 VACC RECOMBINANT DNA IM: CPT

## 2020-10-08 ENCOUNTER — ANNUAL EXAM (OUTPATIENT)
Dept: OBGYN CLINIC | Facility: CLINIC | Age: 65
End: 2020-10-08
Payer: COMMERCIAL

## 2020-10-08 VITALS
BODY MASS INDEX: 33.12 KG/M2 | HEIGHT: 67 IN | SYSTOLIC BLOOD PRESSURE: 100 MMHG | WEIGHT: 211 LBS | DIASTOLIC BLOOD PRESSURE: 60 MMHG

## 2020-10-08 DIAGNOSIS — Z01.419 ENCOUNTER FOR ANNUAL ROUTINE GYNECOLOGICAL EXAMINATION: ICD-10-CM

## 2020-10-08 DIAGNOSIS — Z12.31 ENCOUNTER FOR SCREENING MAMMOGRAM FOR BREAST CANCER: Primary | ICD-10-CM

## 2020-10-08 PROCEDURE — 99396 PREV VISIT EST AGE 40-64: CPT | Performed by: OBSTETRICS & GYNECOLOGY

## 2020-10-08 PROCEDURE — 1036F TOBACCO NON-USER: CPT | Performed by: OBSTETRICS & GYNECOLOGY

## 2020-12-16 DIAGNOSIS — E78.00 HYPERCHOLESTEREMIA: ICD-10-CM

## 2020-12-16 DIAGNOSIS — F33.42 RECURRENT MAJOR DEPRESSIVE DISORDER, IN FULL REMISSION (HCC): ICD-10-CM

## 2020-12-17 RX ORDER — BUPROPION HYDROCHLORIDE 300 MG/1
TABLET ORAL
Qty: 90 TABLET | Refills: 3 | Status: SHIPPED | OUTPATIENT
Start: 2020-12-17 | End: 2022-02-10 | Stop reason: SDUPTHER

## 2020-12-17 RX ORDER — PRAVASTATIN SODIUM 20 MG
TABLET ORAL
Qty: 90 TABLET | Refills: 3 | Status: SHIPPED | OUTPATIENT
Start: 2020-12-17 | End: 2022-02-10 | Stop reason: SDUPTHER

## 2021-01-01 DIAGNOSIS — E03.9 ACQUIRED HYPOTHYROIDISM: ICD-10-CM

## 2021-01-01 RX ORDER — LEVOTHYROXINE SODIUM 0.2 MG/1
TABLET ORAL
Qty: 90 TABLET | Refills: 3 | Status: SHIPPED | OUTPATIENT
Start: 2021-01-01 | End: 2022-02-10 | Stop reason: SDUPTHER

## 2021-02-08 ENCOUNTER — OFFICE VISIT (OUTPATIENT)
Dept: FAMILY MEDICINE CLINIC | Facility: CLINIC | Age: 66
End: 2021-02-08
Payer: COMMERCIAL

## 2021-02-08 VITALS
RESPIRATION RATE: 14 BRPM | BODY MASS INDEX: 34.53 KG/M2 | HEART RATE: 72 BPM | HEIGHT: 67 IN | TEMPERATURE: 96.7 F | WEIGHT: 220 LBS | SYSTOLIC BLOOD PRESSURE: 116 MMHG | DIASTOLIC BLOOD PRESSURE: 82 MMHG

## 2021-02-08 DIAGNOSIS — R73.01 IMPAIRED FASTING GLUCOSE: ICD-10-CM

## 2021-02-08 DIAGNOSIS — L50.8 CHRONIC URTICARIA: ICD-10-CM

## 2021-02-08 DIAGNOSIS — G47.33 OSA ON CPAP: ICD-10-CM

## 2021-02-08 DIAGNOSIS — Z91.018 FOOD ALLERGY: ICD-10-CM

## 2021-02-08 DIAGNOSIS — Z99.89 OSA ON CPAP: ICD-10-CM

## 2021-02-08 DIAGNOSIS — K76.0 FATTY LIVER: ICD-10-CM

## 2021-02-08 DIAGNOSIS — Z00.00 WELL ADULT EXAM: Primary | ICD-10-CM

## 2021-02-08 DIAGNOSIS — L50.9 HIVES: ICD-10-CM

## 2021-02-08 DIAGNOSIS — M81.0 AGE-RELATED OSTEOPOROSIS WITHOUT CURRENT PATHOLOGICAL FRACTURE: ICD-10-CM

## 2021-02-08 DIAGNOSIS — F33.42 RECURRENT MAJOR DEPRESSIVE DISORDER, IN FULL REMISSION (HCC): ICD-10-CM

## 2021-02-08 DIAGNOSIS — E78.2 MIXED HYPERLIPIDEMIA: ICD-10-CM

## 2021-02-08 DIAGNOSIS — E03.9 ACQUIRED HYPOTHYROIDISM: ICD-10-CM

## 2021-02-08 PROCEDURE — 3008F BODY MASS INDEX DOCD: CPT | Performed by: FAMILY MEDICINE

## 2021-02-08 PROCEDURE — 99396 PREV VISIT EST AGE 40-64: CPT | Performed by: FAMILY MEDICINE

## 2021-02-08 PROCEDURE — 3725F SCREEN DEPRESSION PERFORMED: CPT | Performed by: FAMILY MEDICINE

## 2021-02-08 PROCEDURE — 1036F TOBACCO NON-USER: CPT | Performed by: FAMILY MEDICINE

## 2021-02-08 RX ORDER — VITAMIN B COMPLEX
TABLET ORAL
COMMUNITY
Start: 2021-01-20

## 2021-02-08 RX ORDER — PREDNISONE 20 MG/1
20 TABLET ORAL 2 TIMES DAILY WITH MEALS
Qty: 10 TABLET | Refills: 2 | Status: SHIPPED | OUTPATIENT
Start: 2021-02-08 | End: 2021-02-13

## 2021-02-08 NOTE — PROGRESS NOTES
Assessment/Plan:         Diagnoses and all orders for this visit:    Well adult exam    Mixed hyperlipidemia  -     Lipid panel    Impaired fasting glucose  -     Hemoglobin A1C    DIONE on CPAP    Fatty liver  -     CBC and differential  -     Comprehensive metabolic panel    Acquired hypothyroidism  -     TSH, 3rd generation with Free T4 reflex    Chronic urticaria    Food allergy    Recurrent major depressive disorder, in full remission ([facility] [address] )    Age-related osteoporosis without current pathological fracture  -     DXA bone density spine hip and pelvis; Future    Hives  -     predniSONE 20 mg tablet; Take 1 tablet (20 mg total) by mouth 2 (two) times a day with meals for 5 days    Other orders  -     cholecalciferol (VITAMIN D3) 25 mcg (1,000 units) tablet  -     Calcium Carbonate-Vit D-Min (CALCIUM 1200 PO); Take by mouth daily          Continue with current medications  Trial of Zyrtec 10 mg daily  Consider addition of Pepcid if not effective  Repeat labs  Follow up DEXA scan  Up to date with flu vaccine and shingles vaccine  BMI Counseling: Body mass index is 34 98 kg/m²  The BMI is above normal  Nutrition recommendations include reducing portion sizes, decreasing overall calorie intake, consuming healthier snacks, moderation in carbohydrate intake, reducing intake of saturated fat and trans fat and reducing intake of cholesterol  Exercise recommendations include exercising 3-5 times per week  Patient ID: Radha Henning is a 60 y o  female  60year old female here for Wellness exam   Medications reviewed Hospitalizations/surgeries left distal radius fracture ORIF  Nasal fracture surgery  Mohs surgery  Family history breast cancer mother colon cancer parents  Social history former smoker quit 2002  Hyperlipidemia mixed type on pravastatin 20 mg daily  Last lipid profile 09/2019 cholesterol 181  Triglycerides 154  HDL 61  LDL 89  06/2020 FBS 98   Mammogram 09/2020       The following portions of the patient's history were reviewed and updated as appropriate: allergies, current medications, past family history, past medical history, past social history, past surgical history and problem list     Review of Systems   Constitutional: Positive for unexpected weight change (9 lb weight gain from 10/2020 )  Negative for appetite change, chills, fatigue and fever  HENT: Negative for congestion, ear pain, rhinorrhea, sore throat and trouble swallowing  Eyes: Negative for visual disturbance  Respiratory: Positive for apnea  Negative for cough, shortness of breath and wheezing  DIONE on CPAP   Cardiovascular: Negative for chest pain, palpitations and leg swelling  Gastrointestinal: Negative for abdominal pain, blood in stool, constipation, diarrhea, nausea and vomiting  Colonoscopy 08/2019 normal except for severe diverticulosis sigmoid coon  Endocrine:        Hypothyroidism on Levothyroxine 200 mcg daily  06/2020 TSH normal at 1 90  DEXA scan 02/2019 osteopenia T score -2 3 right distal radius  Fracture risk assessment low risk  On MVI daily    Genitourinary: Negative for difficulty urinating  GYN and pap smear 10/2018  Musculoskeletal: Negative for arthralgias and myalgias  S/p fracture left distal radius 02/2016 secondary to fall,   Skin: Positive for rash  s/p resection SCC right face followed by Dermatology  Chronic hives with prior episodes of swelling of lips and tongue  Prior allergist evaluation  Food allergy panel 07/2017 + shrimp  no reaction to shrimp  Repeat allergy testing 2018 + trees, grass, dust and eggs  No improvement with Allegra  She has an Epi Pen  Allergic/Immunologic: Positive for food allergies  Neurological: Negative for dizziness and headaches  Hematological: Negative for adenopathy  Does not bruise/bleed easily  Psychiatric/Behavioral: Negative for dysphoric mood, sleep disturbance and suicidal ideas  The patient is nervous/anxious  Longstanding history of depression stable on Wellbutrin  mg/day and Prozac 20 mg daily  History of alcoholism with prior in patient rehab  She had been attending AA meetings until onset of COV 19  She relapsed and started drinking again  She opted to pursue out patient treatment for her alcoholism  She has started back with AA meetings  She completed a partial out program 3 days a week at [facility]  Objective:      /82   Pulse 72   Temp (!) 96 7 °F (35 9 °C)   Resp 14   Ht 5' 6 5" (1 689 m)   Wt 99 8 kg (220 lb)   BMI 34 98 kg/m²       BP Readings from Last 3 Encounters:   02/08/21 116/82   10/08/20 100/60   09/09/20 109/72       Wt Readings from Last 3 Encounters:   02/08/21 99 8 kg (220 lb)   10/08/20 95 7 kg (211 lb)   09/09/20 96 6 kg (213 lb)          Physical Exam  Vitals signs and nursing note reviewed  Constitutional:       General: She is not in acute distress  Appearance: She is well-developed  HENT:      Right Ear: Tympanic membrane and ear canal normal       Left Ear: Tympanic membrane and ear canal normal    Eyes:      General: No scleral icterus  Extraocular Movements: Extraocular movements intact  Conjunctiva/sclera: Conjunctivae normal       Pupils: Pupils are equal, round, and reactive to light  Neck:      Thyroid: No thyroid mass or thyromegaly  Vascular: No carotid bruit or JVD  Trachea: No tracheal deviation  Cardiovascular:      Rate and Rhythm: Normal rate and regular rhythm  Heart sounds: Normal heart sounds  No murmur  No gallop  Pulmonary:      Effort: Pulmonary effort is normal  No respiratory distress  Breath sounds: Normal breath sounds  No wheezing or rales  Abdominal:      General: Bowel sounds are normal  There is no distension or abdominal bruit  Palpations: Abdomen is soft  There is no hepatomegaly, splenomegaly or mass  Tenderness: There is no abdominal tenderness   There is no guarding or rebound  Musculoskeletal:      Right lower leg: No edema  Left lower leg: No edema  Lymphadenopathy:      Cervical: No cervical adenopathy  Upper Body:      Right upper body: No supraclavicular adenopathy  Left upper body: No supraclavicular adenopathy  Skin:     Findings: No rash  Nails: There is no clubbing  Neurological:      General: No focal deficit present  Mental Status: She is alert and oriented to person, place, and time     Psychiatric:         Mood and Affect: Mood normal

## 2021-02-21 DIAGNOSIS — F33.0 MILD EPISODE OF RECURRENT MAJOR DEPRESSIVE DISORDER (HCC): ICD-10-CM

## 2021-02-22 RX ORDER — FLUOXETINE HYDROCHLORIDE 20 MG/1
CAPSULE ORAL
Qty: 90 CAPSULE | Refills: 3 | Status: SHIPPED | OUTPATIENT
Start: 2021-02-22 | End: 2022-02-10 | Stop reason: SDUPTHER

## 2021-02-24 LAB
ALBUMIN SERPL-MCNC: 4.5 G/DL (ref 3.8–4.8)
ALBUMIN/GLOB SERPL: 1.9 {RATIO} (ref 1.2–2.2)
ALP SERPL-CCNC: 75 IU/L (ref 39–117)
ALT SERPL-CCNC: 20 IU/L (ref 0–32)
AST SERPL-CCNC: 19 IU/L (ref 0–40)
BASOPHILS # BLD AUTO: 0.1 X10E3/UL (ref 0–0.2)
BASOPHILS NFR BLD AUTO: 1 %
BILIRUB SERPL-MCNC: 0.4 MG/DL (ref 0–1.2)
BUN SERPL-MCNC: 12 MG/DL (ref 8–27)
BUN/CREAT SERPL: 18 (ref 12–28)
CALCIUM SERPL-MCNC: 9.5 MG/DL (ref 8.7–10.3)
CHLORIDE SERPL-SCNC: 106 MMOL/L (ref 96–106)
CHOLEST SERPL-MCNC: 190 MG/DL (ref 100–199)
CHOLEST/HDLC SERPL: 3.2 RATIO (ref 0–4.4)
CO2 SERPL-SCNC: 22 MMOL/L (ref 20–29)
CREAT SERPL-MCNC: 0.68 MG/DL (ref 0.57–1)
EOSINOPHIL # BLD AUTO: 0.1 X10E3/UL (ref 0–0.4)
EOSINOPHIL NFR BLD AUTO: 2 %
ERYTHROCYTE [DISTWIDTH] IN BLOOD BY AUTOMATED COUNT: 11.9 % (ref 11.7–15.4)
EST. AVERAGE GLUCOSE BLD GHB EST-MCNC: 111 MG/DL
GLOBULIN SER-MCNC: 2.4 G/DL (ref 1.5–4.5)
GLUCOSE SERPL-MCNC: 100 MG/DL (ref 65–99)
HBA1C MFR BLD: 5.5 % (ref 4.8–5.6)
HCT VFR BLD AUTO: 39.3 % (ref 34–46.6)
HDLC SERPL-MCNC: 60 MG/DL
HGB BLD-MCNC: 13.2 G/DL (ref 11.1–15.9)
IMM GRANULOCYTES # BLD: 0 X10E3/UL (ref 0–0.1)
IMM GRANULOCYTES NFR BLD: 1 %
LDLC SERPL CALC-MCNC: 96 MG/DL (ref 0–99)
LYMPHOCYTES # BLD AUTO: 1.7 X10E3/UL (ref 0.7–3.1)
LYMPHOCYTES NFR BLD AUTO: 30 %
MCH RBC QN AUTO: 31.7 PG (ref 26.6–33)
MCHC RBC AUTO-ENTMCNC: 33.6 G/DL (ref 31.5–35.7)
MCV RBC AUTO: 94 FL (ref 79–97)
MONOCYTES # BLD AUTO: 0.6 X10E3/UL (ref 0.1–0.9)
MONOCYTES NFR BLD AUTO: 11 %
NEUTROPHILS # BLD AUTO: 3.2 X10E3/UL (ref 1.4–7)
NEUTROPHILS NFR BLD AUTO: 55 %
PLATELET # BLD AUTO: 173 X10E3/UL (ref 150–450)
POTASSIUM SERPL-SCNC: 4.4 MMOL/L (ref 3.5–5.2)
PROT SERPL-MCNC: 6.9 G/DL (ref 6–8.5)
RBC # BLD AUTO: 4.17 X10E6/UL (ref 3.77–5.28)
SL AMB EGFR AFRICAN AMERICAN: 107 ML/MIN/1.73
SL AMB EGFR NON AFRICAN AMERICAN: 93 ML/MIN/1.73
SL AMB VLDL CHOLESTEROL CALC: 34 MG/DL (ref 5–40)
SODIUM SERPL-SCNC: 141 MMOL/L (ref 134–144)
TRIGL SERPL-MCNC: 201 MG/DL (ref 0–149)
TSH SERPL DL<=0.005 MIU/L-ACNC: 0.75 UIU/ML (ref 0.45–4.5)
WBC # BLD AUTO: 5.7 X10E3/UL (ref 3.4–10.8)

## 2021-02-24 NOTE — RESULT ENCOUNTER NOTE
Megan I reviewed your labs  CBC normal  No anemia  Chemistry profile normal except for a fasting blood sugar of 100- normal less than 100  This improved from 113- 1 year ago  Your cholesterol is normal at 190 less than 200 normal   Mildly elevated triglycerides at 201  normal less than 150  Watch diet  You can increase your fish oil capsules to 2/day  Two years ago your triglycerides were 331  TSH or thyroid test normal   No changes in your levothyroxine dose  A1c normal at 5 5  This blood test reflects an average of your blood sugars over the last 3 months normal less than 5 7      Current Outpatient Medications:     buPROPion (WELLBUTRIN XL) 300 mg 24 hr tablet, TAKE 1 TABLET BY MOUTH  DAILY, Disp: 90 tablet, Rfl: 3    Calcium Carbonate-Vit D-Min (CALCIUM 1200 PO), Take by mouth daily, Disp: , Rfl:     cholecalciferol (VITAMIN D3) 25 mcg (1,000 units) tablet, , Disp: , Rfl:     levothyroxine 200 mcg tablet, TAKE 1 TABLET BY MOUTH  DAILY IN THE EARLY MORNING, Disp: 90 tablet, Rfl: 3    Magnesium 200 MG TABS, Take 1 tablet by mouth daily, Disp: , Rfl:     Multiple Vitamins-Minerals (MULTIVITAMIN ADULT PO), Take 1 tablet by mouth daily, Disp: , Rfl:     Omega-3 Fatty Acids (FISH OIL) 1000 MG CPDR, Take 1 capsule by mouth daily, Disp: , Rfl:     pravastatin (PRAVACHOL) 20 mg tablet, TAKE 1 TABLET BY MOUTH  DAILY, Disp: 90 tablet, Rfl: 3    vitamin E 100 UNIT capsule, Take 1 capsule by mouth daily, Disp: , Rfl:     FLUoxetine (PROzac) 20 mg capsule, TAKE 1 CAPSULE BY MOUTH  DAILY, Disp: 90 capsule, Rfl: 3    Current Facility-Administered Medications:     EPINEPHrine PF (ADRENALIN) 1 mg/mL injection 0 5 mg, 0 5 mg, Intramuscular, Once, Alexsandra Valladares MD

## 2021-03-02 DIAGNOSIS — M81.0 AGE-RELATED OSTEOPOROSIS WITHOUT CURRENT PATHOLOGICAL FRACTURE: ICD-10-CM

## 2021-03-10 DIAGNOSIS — Z23 ENCOUNTER FOR IMMUNIZATION: ICD-10-CM

## 2021-03-21 ENCOUNTER — IMMUNIZATIONS (OUTPATIENT)
Dept: FAMILY MEDICINE CLINIC | Facility: HOSPITAL | Age: 66
End: 2021-03-21

## 2021-03-21 DIAGNOSIS — Z23 ENCOUNTER FOR IMMUNIZATION: Primary | ICD-10-CM

## 2021-03-21 PROCEDURE — 91300 SARS-COV-2 / COVID-19 MRNA VACCINE (PFIZER-BIONTECH) 30 MCG: CPT

## 2021-03-21 PROCEDURE — 0001A SARS-COV-2 / COVID-19 MRNA VACCINE (PFIZER-BIONTECH) 30 MCG: CPT

## 2021-04-11 ENCOUNTER — IMMUNIZATIONS (OUTPATIENT)
Dept: FAMILY MEDICINE CLINIC | Facility: HOSPITAL | Age: 66
End: 2021-04-11

## 2021-04-11 DIAGNOSIS — Z23 ENCOUNTER FOR IMMUNIZATION: Primary | ICD-10-CM

## 2021-04-11 PROCEDURE — 91300 SARS-COV-2 / COVID-19 MRNA VACCINE (PFIZER-BIONTECH) 30 MCG: CPT

## 2021-04-11 PROCEDURE — 0002A SARS-COV-2 / COVID-19 MRNA VACCINE (PFIZER-BIONTECH) 30 MCG: CPT

## 2021-06-17 ENCOUNTER — OFFICE VISIT (OUTPATIENT)
Dept: PULMONOLOGY | Facility: CLINIC | Age: 66
End: 2021-06-17
Payer: COMMERCIAL

## 2021-06-17 VITALS
OXYGEN SATURATION: 96 % | DIASTOLIC BLOOD PRESSURE: 82 MMHG | SYSTOLIC BLOOD PRESSURE: 112 MMHG | BODY MASS INDEX: 34.23 KG/M2 | HEART RATE: 73 BPM | WEIGHT: 213 LBS | HEIGHT: 66 IN | TEMPERATURE: 96.8 F

## 2021-06-17 DIAGNOSIS — G47.33 OSA ON CPAP: Primary | ICD-10-CM

## 2021-06-17 DIAGNOSIS — Z99.89 OSA ON CPAP: Primary | ICD-10-CM

## 2021-06-17 DIAGNOSIS — E66.9 CLASS 1 OBESITY WITHOUT SERIOUS COMORBIDITY WITH BODY MASS INDEX (BMI) OF 34.0 TO 34.9 IN ADULT, UNSPECIFIED OBESITY TYPE: ICD-10-CM

## 2021-06-17 DIAGNOSIS — G47.19 EXCESSIVE DAYTIME SLEEPINESS: ICD-10-CM

## 2021-06-17 DIAGNOSIS — G47.9 SLEEP DISTURBANCE: ICD-10-CM

## 2021-06-17 PROBLEM — E66.811 CLASS 1 OBESITY WITHOUT SERIOUS COMORBIDITY WITH BODY MASS INDEX (BMI) OF 34.0 TO 34.9 IN ADULT: Status: ACTIVE | Noted: 2021-06-17

## 2021-06-17 PROCEDURE — 99204 OFFICE O/P NEW MOD 45 MIN: CPT | Performed by: INTERNAL MEDICINE

## 2021-06-17 NOTE — PATIENT INSTRUCTIONS
Sleep Apnea   AMBULATORY CARE:   Sleep apnea  is a condition that causes you to stop breathing for 10 seconds or longer during sleep  Obstructive sleep apnea is the most common kind  The muscles and tissues around your throat relax and block air from passing through  Obesity, use of alcohol or cigarettes, or a family history are common causes  Central sleep apnea means your brain does not send signals to the muscles that control breathing  You do not take a breath even though your airway is open  Common causes include medical conditions such as heart failure, being older than 65, or use of opioids  Common signs and symptoms include the following:   · Snoring loudly, snorting, gasping or choking while you sleep, and waking up suddenly because of these    · A hard time thinking, remembering things, or focusing on your tasks the following day    · Headache or nausea    · Waking up often during the night to urinate    · Feeling sleepy, slow, and tired during the day    Call your local emergency number (911 in the [address] [facility]) if:   · You have chest pain or trouble breathing  Call your doctor if:   · You feel tired or depressed  · You have trouble staying awake during the day  · You have trouble thinking clearly  · You have questions or concerns about your condition or care  How sleep apnea is diagnosed:   · Your healthcare provider will ask about your signs and symptoms, when they began, and how bad they are  He or she may ask about medical conditions you have and what medicines you take  Tell your healthcare provider if you smoke and if anyone in your family has sleep apnea  Your healthcare provider may ask the person who sleeps beside you about your signs  · You may need to wear a device that monitors the oxygen level in your blood while you sleep  You may need to have a sleep study (polysomnography) if you have daytime sleepiness   A sleep study helps show your brain, heart, and respiratory system are working during sleep  Sleep studies may monitor the stages of sleep, oxygen levels, body position, eye movement, and snoring  Treatment  depends on the kind of sleep apnea you have:  · A machine  may be used to help you get more air during sleep  A mask may be placed over your nose and mouth, or just your nose  The mask is hooked to the machine  You will get air through the mask  ? A continuous positive airway pressure (CPAP) machine  is used to keep your airway open during sleep  The machine blows a gentle stream of air into the mask when you breathe  This helps keep your airway open so you can breathe more regularly  Extra oxygen may be given through the machine  ? A bilevel positive airway pressure (BiPAP) machine  gives air but lowers the pressure when you breathe out  ? An adaptive servo-ventilator  is a machine that only gives air when it senses you are not breathing  · A mouth device  that looks like a mouth guard stops your tongue and other tissues from blocking airflow  · Surgery  may be needed to remove extra tissues that block your mouth, throat, or nose  Manage or prevent sleep apnea:   · Do not smoke  Nicotine and other chemicals in cigarettes and cigars can cause lung damage  Ask your healthcare provider for information if you currently smoke and need help to quit  E-cigarettes or smokeless tobacco still contain nicotine  Talk to your healthcare provider before you use these products  · Do not drink alcohol or take sedative medicine before you go to sleep  Alcohol and sedatives can relax the muscles and tissues around your throat  This can block the airflow to your lungs  · Maintain a healthy weight  Your healthcare provider can tell you what weight is healthy for you  He or she can help you create a weight loss plan, if needed  The plan will include healthy foods and regular exercise to help you reach your healthy weight   Exercise can also help you sleep and may reduce stress  · Sleep on your side or use pillows designed to prevent sleep apnea  This prevents your tongue or other tissues from blocking your throat  You can also raise the head of your bed  Follow up with your doctor as directed: You may need to have blood tests during your follow-up visits  You will need to work with your healthcare provider to find the right breathing support equipment and settings for you  Write down your questions so you remember to ask them during your visits  © Copyright [address] Information is for End User's use only and may not be sold, redistributed or otherwise used for commercial purposes  All illustrations and images included in CareNotes® are the copyrighted property of A D A M , Inc  or [address] [place] [address]  The above information is an  only  It is not intended as medical advice for individual conditions or treatments  Talk to your doctor, nurse or pharmacist before following any medical regimen to see if it is safe and effective for you

## 2021-06-17 NOTE — ASSESSMENT & PLAN NOTE
Secondary to obstructive sleep apnea as detailed above, re-establish diagnosis, she will need replacement for her equipment a new machines

## 2021-06-17 NOTE — ASSESSMENT & PLAN NOTE
· The patient was diagnosed in the early 2000s  ·  her current CPAP machine is about 10years old  ·  she has gained more than 30 pound since then  ·  I would like to reestablish her diagnosis obtain home sleep study test  ·  likely will be on auto titrating CPAP afterwards she will need a replacement for her equipment

## 2021-06-17 NOTE — PROGRESS NOTES
Sleep Consultation   Carmel Ortiz 60 y o  female MRN: [id number]      Reason for consultation: DIONE     Requesting physician: Dr Ruby Wood    Assessment/Plan  1  DIONE on CPAP  Assessment & Plan:  · The patient was diagnosed in the early 2000s  ·  her current CPAP machine is about 10years old  ·  she has gained more than 30 pound since then  ·  I would like to reestablish her diagnosis obtain home sleep study test  ·  likely will be on auto titrating CPAP afterwards she will need a replacement for her equipment    Orders:  -     Home Study; Future    2  Class 1 obesity without serious comorbidity with body mass index (BMI) of 34 0 to 34 9 in adult, unspecified obesity type  Assessment & Plan:   Counseling for lifestyle modifications and weight loss      3  Sleep disturbance  Assessment & Plan:   Secondary to obstructive sleep apnea as detailed      4  Excessive daytime sleepiness  Assessment & Plan:   Secondary to obstructive sleep apnea as detailed above, re-establish diagnosis, she will need replacement for her equipment a new machines            History of Present Illness   HPI:  Carmel Ortiz is a 60 y o  female who  Is here for a new sleep consultation as she needs a new CPAP machine 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 she was diagnosed with obstructive sleep apnea in the early 2000s and she had a CPAP machine that she has replaced last machine she has currently was set up in 2015 which is more than 5 years ago  She goes to bed around 9 p m  falls asleep in 1 hour wakes up at 6 a m  she has 3-4 awakenings during the night for dreams and she takes naps usually every day about half an hour in the afternoon she used to snore loudly and feels tired all the day but since she was on CPAP the symptoms have gotten better  She has teeth grinding, feelings of depression and anxiety which is controlled on her medications    She drinks coffee, used to smoke in the past and quit 20 years ago, denies alcohol or drugs abuse  Her [place] scale on the CPAP is 724        Review of Systems      Genitourinary none   Cardiology none   Gastrointestinal none   Neurology none   Constitutional none   Integumentary none   Psychiatry anxiety and depression   Musculoskeletal none   Pulmonary none   ENT none   Endocrine none   Hematological none               Historical Information   Past Medical History:   Diagnosis Date    Anxiety     Closed fracture of distal radius and ulna, left, initial encounter     Last Assessed:  16    Depression     Disease of thyroid gland     Essential hypertension     Fibromyalgia     GERD (gastroesophageal reflux disease)     Sleep apnea     pt uses c-pap    Squamous cell carcinoma of skin     Last Assessed:  17     Past Surgical History:   Procedure Laterality Date    COLONOSCOPY N/A 2016    Procedure: COLONOSCOPY WITH ENDOCLIPS AND INJECTION FOR HEMOSTASIS;  Surgeon: Trish Escamilla MD;  Location: [facility];  Service:     COLONOSCOPY  2019    MOHS RECONSTRUCTION      NASAL FRACTURE SURGERY      TX OPEN RX DISTAL RADIUS FX, INTRA-ARTICULAR, 3+ FRAG Left 2016    Procedure: OPEN REDUCTION W/ INTERNAL FIXATION (ORIF)DISTAL  RADIUS ;  Surgeon: Jeffery Birmingham MD;  Location: [facility];  Service: Orthopedics     Family History   Problem Relation Age of Onset    Cancer Mother         Breast    Breast cancer Mother     Colon cancer Mother     Cancer Father         Colon    Colon cancer Father      Social History     Socioeconomic History    Marital status: /Civil Union     Spouse name: Not on file    Number of children: Not on file    Years of education: Not on file    Highest education level: Not on file   Occupational History    Not on file   Tobacco Use    Smoking status: Former Smoker     Packs/day: 1 00     Types: Cigarettes     Start date: 0     Quit date:      Years since quittin 4    Smokeless tobacco: Never Used   Vaping Use    Vaping Use: Never used   Substance and Sexual Activity    Alcohol use: No    Drug use: No    Sexual activity: Not Currently   Other Topics Concern    Not on file   Social History Narrative    Not on file     Social Determinants of Health     Financial Resource Strain:     Difficulty of Paying Living Expenses:    Food Insecurity:     Worried About Running Out of Food in the Last Year:     Ran Out of Food in the Last Year:    Transportation Needs:     Lack of Transportation (Medical):  Lack of Transportation (Non-Medical):    Physical Activity:     Days of Exercise per Week:     Minutes of Exercise per Session:    Stress:     Feeling of Stress :    Social Connections:     Frequency of Communication with Friends and Family:     Frequency of Social Gatherings with Friends and Family:     Attends Oriental orthodox Services:     Active Member of Clubs or Organizations:     Attends Club or Organization Meetings:     Marital Status:    Intimate Partner Violence:     Fear of Current or Ex-Partner:     Emotionally Abused:     Physically Abused:     Sexually Abused:        Occupational History:  retired    Meds/Allergies   Allergies   Allergen Reactions    Erythromycin Shortness Of Breath     Reaction Date: [id number];     Cefprozil      Reaction Date: [id number];     Cephalosporins        Home medications:  Prior to Admission medications    Medication Sig Start Date End Date Taking?  Authorizing Provider   buPROPion (WELLBUTRIN XL) 300 mg 24 hr tablet TAKE 1 TABLET BY MOUTH  DAILY 12/17/20  Yes Vicky Aguilar MD   Calcium Carbonate-Vit D-Min (CALCIUM 1200 PO) Take by mouth daily   Yes Historical Provider, MD   cholecalciferol (VITAMIN D3) 25 mcg (1,000 units) tablet  1/20/21  Yes Historical Provider, MD   FLUoxetine (PROzac) 20 mg capsule TAKE 1 CAPSULE BY MOUTH  DAILY 2/22/21  Yes Vicky Aguilar MD   levothyroxine 200 mcg tablet TAKE 1 TABLET BY MOUTH  DAILY IN THE EARLY MORNING 1/1/21  Yes Rome Humphries Cinthia Diaz MD   Magnesium 200 MG TABS Take 1 tablet by mouth daily   Yes Historical Provider, MD   Multiple Vitamins-Minerals (MULTIVITAMIN ADULT PO) Take 1 tablet by mouth daily   Yes Historical Provider, MD   Omega-3 Fatty Acids (FISH OIL) 1000 MG CPDR Take 1 capsule by mouth daily   Yes Historical Provider, MD   pravastatin (PRAVACHOL) 20 mg tablet TAKE 1 TABLET BY MOUTH  DAILY 12/17/20  Yes Alize Pride MD   vitamin E 100 UNIT capsule Take 1 capsule by mouth daily   Yes Historical Provider, MD       Vitals:   Blood pressure 112/82, pulse 73, temperature (!) 96 8 °F (36 °C), temperature source Tympanic, height 5' 6" (1 676 m), weight 96 6 kg (213 lb), SpO2 96 %  ,  Body mass index is 34 38 kg/m²  Physical Exam  General:  Awake alert and oriented x 3, conversant without conversational dyspnea, NAD, normal affect  HEENT:  PERRL, Sclera noninjected, nonicteric OU, Nares patent,  no craniofacial abnormalities, Mucous membranes, moist, no oral lesions, normal dentition, Mallampati class 4  NECK:  Trachea midline, no accessory muscle use, no stridor, no cervical or supraclavicular adenopathy, JVP not elevated  CARDIAC: Reg, single s1/S2, no m/r/g  PULM: CTA bilaterally no wheezing, rhonchi or rales  ABD: Normoactive bowel sounds, soft nontender, nondistended, no rebound, no rigidity, no guarding  EXT: No cyanosis, no clubbing, no edema, normal capillary refill  NEURO: no focal neurologic deficits, AAOx3, moving all extremities appropriately    Labs: I have personally reviewed pertinent lab results  , ABG: No results found for: PHART, BMA2UDW, PO2ART, TQT5PLF, L9CHEFGA, BEART, SOURCE, BNP: No results found for: BNP, CBC: No results found for: WBC, HGB, HCT, MCV, PLT, ADJUSTEDWBC, MCH, MCHC, RDW, MPV, NRBC, CMP: No results found for: SODIUM, K, CL, CO2, ANIONGAP, BUN, CREATININE, GLUCOSE, CALCIUM, AST, ALT, ALKPHOS, PROT, BILITOT, EGFR, PT/INR: No results found for: PT, INR, Troponin: No results found for: TROPONINI  Lab Results   Component Value Date    WBC 5 7 02/23/2021    HGB 13 2 02/23/2021    HCT 39 3 02/23/2021    MCV 94 02/23/2021     02/23/2021      Lab Results   Component Value Date    GLUCOSE 111 (H) 01/20/2017    CALCIUM 9 6 02/16/2017     01/20/2017    K 4 4 02/23/2021    CO2 22 02/23/2021     02/23/2021    BUN 12 02/23/2021    CREATININE 0 68 02/23/2021     No results found for: IRON, TIBC, FERRITIN  No results found for: [id number]  No results found for: Grace Uribe MD  [facility]       Portions of the record may have been created with voice recognition software  Occasional wrong word or "sound a like" substitutions may have occurred due to the inherent limitations of voice recognition software  Read the chart carefully and recognize, using context, where substitutions have occurred

## 2021-08-02 ENCOUNTER — TELEMEDICINE (OUTPATIENT)
Dept: FAMILY MEDICINE CLINIC | Facility: CLINIC | Age: 66
End: 2021-08-02
Payer: COMMERCIAL

## 2021-08-02 DIAGNOSIS — B34.9 VIRAL ILLNESS: Primary | ICD-10-CM

## 2021-08-02 PROCEDURE — 1036F TOBACCO NON-USER: CPT | Performed by: FAMILY MEDICINE

## 2021-08-02 PROCEDURE — 99212 OFFICE O/P EST SF 10 MIN: CPT | Performed by: FAMILY MEDICINE

## 2021-08-02 NOTE — PROGRESS NOTES
Virtual Brief Visit    Verification of patient location:    Patient is located in the following state in which I hold an active license [place]      Assessment/Plan:    Problem List Items Addressed This Visit     None      Visit Diagnoses     Viral illness    -  Primary        Symptom treatment  Defer COV 19 testing at this time  Advised to call if any changes  Reason for visit is   Chief Complaint   Patient presents with    Virtual Brief Visit        Encounter provider Ally Rodriguez MD    Provider located at [place] [address]  [phone]    Recent Visits  No visits were found meeting these conditions  Showing recent visits within past 7 days and meeting all other requirements  Today's Visits  Date Type Provider Dept   08/02/21 Telemedicine Ally Rodriguez MD [facility]   Showing today's visits and meeting all other requirements  Future Appointments  No visits were found meeting these conditions  Showing future appointments within next 150 days and meeting all other requirements       After connecting through telephone, the patient was identified by name and date of birth  Concha Zafar was informed that this is a telemedicine visit and that the visit is being conducted through telephone  My office door was closed  No one else was in the room  She acknowledged consent and understanding of privacy and security of the platform  The patient has agreed to participate and understands she can discontinue the visit at any time  Patient is aware this is a billable service  Casandra Stewart is a 60 y o  female It was my intent to perform this visit via video technology but the patient was not able to do a video connection so the visit was completed via audio telephone only     Telemedicine visit-telephone  2 day history of nasal congestion with non productive cough  No T  No loss of taste or smell   She works part time at [employer]  She completed the COV 19 vaccine series 04/2021       Past Medical History:   Diagnosis Date    Anxiety     Closed fracture of distal radius and ulna, left, initial encounter     Last Assessed:  5/13/16    Depression     Disease of thyroid gland     Essential hypertension     Fibromyalgia     GERD (gastroesophageal reflux disease)     Sleep apnea     pt uses c-pap    Squamous cell carcinoma of skin     Last Assessed:  1/17/17       Past Surgical History:   Procedure Laterality Date    COLONOSCOPY N/A 7/14/2016    Procedure: COLONOSCOPY WITH ENDOCLIPS AND INJECTION FOR HEMOSTASIS;  Surgeon: Theo Kramer MD;  [facility];  Service:     COLONOSCOPY  08/2019    MOHS RECONSTRUCTION      NASAL FRACTURE SURGERY      TX OPEN RX DISTAL RADIUS FX, INTRA-ARTICULAR, 3+ FRAG Left 2/25/2016    Procedure: OPEN REDUCTION W/ INTERNAL FIXATION (ORIF)DISTAL  RADIUS ;  Surgeon: Mark Dow MD;  [facility];  Service: Orthopedics       Current Outpatient Medications   Medication Sig Dispense Refill    buPROPion (WELLBUTRIN XL) 300 mg 24 hr tablet TAKE 1 TABLET BY MOUTH  DAILY 90 tablet 3    Calcium Carbonate-Vit D-Min (CALCIUM 1200 PO) Take by mouth daily      cholecalciferol (VITAMIN D3) 25 mcg (1,000 units) tablet       FLUoxetine (PROzac) 20 mg capsule TAKE 1 CAPSULE BY MOUTH  DAILY 90 capsule 3    levothyroxine 200 mcg tablet TAKE 1 TABLET BY MOUTH  DAILY IN THE EARLY MORNING 90 tablet 3    Magnesium 200 MG TABS Take 1 tablet by mouth daily      Multiple Vitamins-Minerals (MULTIVITAMIN ADULT PO) Take 1 tablet by mouth daily      Omega-3 Fatty Acids (FISH OIL) 1000 MG CPDR Take 1 capsule by mouth daily      pravastatin (PRAVACHOL) 20 mg tablet TAKE 1 TABLET BY MOUTH  DAILY 90 tablet 3    vitamin E 100 UNIT capsule Take 1 capsule by mouth daily       Current Facility-Administered Medications   Medication Dose Route Frequency Provider Last Rate Last Admin    EPINEPHrine PF (ADRENALIN) 1 mg/mL injection 0 5 mg  0 5 mg Intramuscular Once Clintney Alivia Ochoa MD            Allergies   Allergen Reactions    Erythromycin Shortness Of Breath     Reaction Date: [id number];     Cefprozil      Reaction Date: [id number];     Cephalosporins        Review of Systems   Constitutional: Positive for appetite change (decreased appetite ) and fatigue  Negative for chills and fever  HENT: Positive for congestion and sore throat  Negative for rhinorrhea, sinus pain and trouble swallowing  Respiratory: Positive for cough  Negative for shortness of breath and wheezing  Gastrointestinal: Positive for nausea  Negative for diarrhea and vomiting  Musculoskeletal: Positive for myalgias  Skin: Negative for rash  Neurological: Positive for headaches  There were no vitals filed for this visit  I spent 7 minutes directly with the patient during this visit    [facility] verbally agrees to participate in [employer]  Pt is aware that [employer] could be limited without vital signs or the ability to perform a full hands-on physical exam  Megan Kovacs understands she or the provider may request at any time to terminate the video visit and request the patient to seek care or treatment in person

## 2021-08-04 ENCOUNTER — NURSE TRIAGE (OUTPATIENT)
Dept: OTHER | Facility: OTHER | Age: 66
End: 2021-08-04

## 2021-08-04 DIAGNOSIS — Z20.822 EXPOSURE TO COVID-19 VIRUS: Primary | ICD-10-CM

## 2021-08-04 PROCEDURE — U0005 INFEC AGEN DETEC AMPLI PROBE: HCPCS | Performed by: FAMILY MEDICINE

## 2021-08-04 PROCEDURE — U0003 INFECTIOUS AGENT DETECTION BY NUCLEIC ACID (DNA OR RNA); SEVERE ACUTE RESPIRATORY SYNDROME CORONAVIRUS 2 (SARS-COV-2) (CORONAVIRUS DISEASE [COVID-19]), AMPLIFIED PROBE TECHNIQUE, MAKING USE OF HIGH THROUGHPUT TECHNOLOGIES AS DESCRIBED BY CMS-2020-01-R: HCPCS | Performed by: FAMILY MEDICINE

## 2021-08-04 NOTE — TELEPHONE ENCOUNTER
Reason for Disposition   [1] COVID-19 infection suspected by caller or triager AND [2] mild symptoms (cough, fever, or others) AND [8] no complications or SOB    Answer Assessment - Initial Assessment Questions  1  COVID-19 DIAGNOSIS: "Who made your Coronavirus (COVID-19) diagnosis?" "Was it confirmed by a positive lab test?" If not diagnosed by a HCP, ask "Are there lots of cases (community spread) where you live?" (See public health department website, if unsure)      Patient developed cold symtoms today   2  COVID-19 EXPOSURE: "Was there any known exposure to COVID before the symptoms began?" CDC Definition of close contact: within 6 feet (2 meters) for a total of 15 minutes or more over a 24-hour period  Unknown   3  ONSET: "When did the COVID-19 symptoms start?"       Today   4  WORST SYMPTOM: "What is your worst symptom?" (e g , cough, fever, shortness of breath, muscle aches)      Sore throat, runny nose, cough  5  COUGH: "Do you have a cough?" If Yes, ask: "How bad is the cough?"        Productive cough with clear phlegm   6  FEVER: "Do you have a fever?" If Yes, ask: "What is your temperature, how was it measured, and when did it start?"      No   7  RESPIRATORY STATUS: "Describe your breathing?" (e g , shortness of breath, wheezing, unable to speak)       Tightness in a chest   9  HIGH RISK DISEASE: "Do you have any chronic medical problems?" (e g , asthma, heart or lung disease, weak immune system, obesity, etc )      No   10  PREGNANCY: "Is there any chance you are pregnant?" "When was your last menstrual period?"        No   11   OTHER SYMPTOMS: "Do you have any other symptoms?"  (e g , chills, fatigue, headache, loss of smell or taste, muscle pain, sore throat; new loss of smell or taste especially support the diagnosis of COVID-19)        Sore throat, chills, muscle aches    Protocols used: CORONAVIRUS (COVID-19) DIAGNOSED OR SUSPECTED-ADULT-AH

## 2021-09-07 ENCOUNTER — HOSPITAL ENCOUNTER (OUTPATIENT)
Dept: SLEEP CENTER | Facility: CLINIC | Age: 66
Discharge: HOME | End: 2021-09-07
Payer: COMMERCIAL

## 2021-09-07 DIAGNOSIS — G47.33 OSA ON CPAP: ICD-10-CM

## 2021-09-07 DIAGNOSIS — Z99.89 OSA ON CPAP: ICD-10-CM

## 2021-09-07 PROCEDURE — G0399 HOME SLEEP TEST/TYPE 3 PORTA: HCPCS

## 2021-09-07 NOTE — PROGRESS NOTES
Home Sleep Study Documentation    Pre-Sleep Home Study:    Set-up and instructions performed by: MM    Technician performed demonstration for Patient: yes    Return demonstration performed by Patient: yes    Written instructions provided to Patient: yes    Patient signed consent form: yes        Post-Sleep Home Study:    Additional comments by Patient: None     Home Sleep Study Failed:pending    Failure reason: N/A    Reported or Detected: N/A    Scored by: GRETA Kumar

## 2021-09-08 DIAGNOSIS — Z99.89 OSA ON CPAP: Primary | ICD-10-CM

## 2021-09-08 DIAGNOSIS — G47.33 OSA ON CPAP: Primary | ICD-10-CM

## 2021-09-08 PROCEDURE — 95806 SLEEP STUDY UNATT&RESP EFFT: CPT | Performed by: INTERNAL MEDICINE

## 2021-09-09 ENCOUNTER — TELEPHONE (OUTPATIENT)
Dept: SLEEP CENTER | Facility: CLINIC | Age: 66
End: 2021-09-09

## 2021-09-09 NOTE — TELEPHONE ENCOUNTER
All information for CPAP set up faxed to [phone] to reschedule appointment for compliance  Pt needs to be seen in December

## 2021-09-09 NOTE — TELEPHONE ENCOUNTER
Spoke with patient, advised sleep study reveals severe DIONE, Dr Hernán Lam recommends APAP  Patient currently has machine but is old, will continue to use [employer]  Please send all information to Sharlene Raymond, will also need to reschedule compliance follow up, 31-90 days after receiving machine

## 2021-10-14 ENCOUNTER — ANNUAL EXAM (OUTPATIENT)
Dept: OBGYN CLINIC | Facility: CLINIC | Age: 66
End: 2021-10-14
Payer: COMMERCIAL

## 2021-10-14 VITALS
HEIGHT: 66 IN | SYSTOLIC BLOOD PRESSURE: 130 MMHG | BODY MASS INDEX: 34.87 KG/M2 | WEIGHT: 217 LBS | DIASTOLIC BLOOD PRESSURE: 80 MMHG

## 2021-10-14 DIAGNOSIS — Z01.419 ROUTINE GYNECOLOGICAL EXAMINATION: ICD-10-CM

## 2021-10-14 DIAGNOSIS — Z11.51 SCREENING FOR HPV (HUMAN PAPILLOMAVIRUS): ICD-10-CM

## 2021-10-14 DIAGNOSIS — Z12.31 ENCOUNTER FOR SCREENING MAMMOGRAM FOR BREAST CANCER: Primary | ICD-10-CM

## 2021-10-14 DIAGNOSIS — N95.2 VAGINAL ATROPHY: ICD-10-CM

## 2021-10-14 PROCEDURE — G0476 HPV COMBO ASSAY CA SCREEN: HCPCS | Performed by: OBSTETRICS & GYNECOLOGY

## 2021-10-14 PROCEDURE — G0101 CA SCREEN;PELVIC/BREAST EXAM: HCPCS | Performed by: OBSTETRICS & GYNECOLOGY

## 2021-10-14 PROCEDURE — G0145 SCR C/V CYTO,THINLAYER,RESCR: HCPCS | Performed by: OBSTETRICS & GYNECOLOGY

## 2021-10-20 LAB
HPV HR 12 DNA CVX QL NAA+PROBE: NEGATIVE
HPV16 DNA CVX QL NAA+PROBE: NEGATIVE
HPV18 DNA CVX QL NAA+PROBE: NEGATIVE

## 2021-10-22 LAB
LAB AP GYN PRIMARY INTERPRETATION: NORMAL
Lab: NORMAL

## 2021-11-23 ENCOUNTER — IMMUNIZATIONS (OUTPATIENT)
Dept: FAMILY MEDICINE CLINIC | Facility: HOSPITAL | Age: 66
End: 2021-11-23

## 2021-11-23 DIAGNOSIS — Z23 ENCOUNTER FOR IMMUNIZATION: Primary | ICD-10-CM

## 2021-11-23 PROCEDURE — 91300 COVID-19 PFIZER VACC 0.3 ML: CPT

## 2021-11-23 PROCEDURE — 0001A COVID-19 PFIZER VACC 0.3 ML: CPT

## 2022-01-05 ENCOUNTER — TELEPHONE (OUTPATIENT)
Dept: PULMONOLOGY | Facility: CLINIC | Age: 67
End: 2022-01-05

## 2022-01-06 ENCOUNTER — OFFICE VISIT (OUTPATIENT)
Dept: PULMONOLOGY | Facility: CLINIC | Age: 67
End: 2022-01-06
Payer: COMMERCIAL

## 2022-01-06 VITALS
OXYGEN SATURATION: 95 % | WEIGHT: 222.2 LBS | SYSTOLIC BLOOD PRESSURE: 110 MMHG | HEIGHT: 66 IN | DIASTOLIC BLOOD PRESSURE: 70 MMHG | TEMPERATURE: 97.9 F | BODY MASS INDEX: 35.71 KG/M2 | HEART RATE: 71 BPM

## 2022-01-06 DIAGNOSIS — E66.01 OBESITY, MORBID (HCC): ICD-10-CM

## 2022-01-06 DIAGNOSIS — G47.19 EXCESSIVE DAYTIME SLEEPINESS: ICD-10-CM

## 2022-01-06 DIAGNOSIS — G47.33 OSA ON CPAP: Primary | ICD-10-CM

## 2022-01-06 DIAGNOSIS — Z99.89 OSA ON CPAP: Primary | ICD-10-CM

## 2022-01-06 DIAGNOSIS — G47.9 SLEEP DISTURBANCE: ICD-10-CM

## 2022-01-06 DIAGNOSIS — E66.9 CLASS 2 OBESITY WITHOUT SERIOUS COMORBIDITY WITH BODY MASS INDEX (BMI) OF 35.0 TO 35.9 IN ADULT, UNSPECIFIED OBESITY TYPE: ICD-10-CM

## 2022-01-06 PROBLEM — E66.812 CLASS 2 OBESITY WITHOUT SERIOUS COMORBIDITY WITH BODY MASS INDEX (BMI) OF 35.0 TO 35.9 IN ADULT: Status: ACTIVE | Noted: 2021-06-17

## 2022-01-06 PROCEDURE — 1036F TOBACCO NON-USER: CPT | Performed by: INTERNAL MEDICINE

## 2022-01-06 PROCEDURE — 99214 OFFICE O/P EST MOD 30 MIN: CPT | Performed by: INTERNAL MEDICINE

## 2022-01-06 PROCEDURE — 1160F RVW MEDS BY RX/DR IN RCRD: CPT | Performed by: INTERNAL MEDICINE

## 2022-01-06 NOTE — PROGRESS NOTES
Pulmonary/Sleep Follow Up Note   Faith Buckley 73 y o  female MRN: [id number]  1/6/2022      Assessment and Plan:    1  DIONE on CPAP  Assessment & Plan:  · She was diagnosed long time ago she underwent another sleep study found to have an AHI of 33 6 events per hour, reestablish her diagnosis  Get a new CPAP machine currently using it every night 100% more than 4 hours average of 9 hours 45 minutes CPAP at 10 cm H2O with residual AHI of 1 9 events per hour  She feels significantly better with the new machine and she is motivated to continue using it  Compliance has been reviewed as detailed below  Orders:  -     PAP DME Resupply/Reorder    2  Obesity, morbid ([facility] [address] )  Assessment & Plan:  Noted      3  Class 2 obesity without serious comorbidity with body mass index (BMI) of 35 0 to 35 9 in adult, unspecified obesity type  Assessment & Plan:  BMI of 35 86  Noted      4  Sleep disturbance  Assessment & Plan:  Secondary to DIONE, as detailed above      5  Excessive daytime sleepiness  Assessment & Plan:  Secondary to DIONE, as detailed above          Return in about 1 year (around 1/6/2023)  History of Present Illness   HPI:  Faith Buckley is a 73 y o  female who is here for follow-up on compliance visit for her recent be establishing diagnosis of severe obstructive sleep apnea  The patient underwent another sleep study to reestablish her diagnosis found have severe DIONE with AHI of 33 6 events per hour she worked as a teacher currently retired, she has been using the new CPAP machine ever since and she has been feeling much better  Her sleep is restful does not feel tired anymore in the morning  Review of Systems   All other systems reviewed and are negative        Historical Information   Past Medical History:   Diagnosis Date    Anxiety     Closed fracture of distal radius and ulna, left, initial encounter     Last Assessed:  5/13/16    Depression     Disease of thyroid gland     Essential hypertension     Fibromyalgia     GERD (gastroesophageal reflux disease)     Sleep apnea     pt uses c-pap    Squamous cell carcinoma of skin     Last Assessed:  1/17/17     Past Surgical History:   Procedure Laterality Date    COLONOSCOPY N/A 7/14/2016    Procedure: COLONOSCOPY WITH ENDOCLIPS AND INJECTION FOR HEMOSTASIS;  Surgeon: Jerry Burch MD;  [facility];  Service:     COLONOSCOPY  08/2019    MOHS RECONSTRUCTION      NASAL FRACTURE SURGERY      MO OPEN RX DISTAL RADIUS FX, INTRA-ARTICULAR, 3+ FRAG Left 2/25/2016    Procedure: OPEN REDUCTION W/ INTERNAL FIXATION (ORIF)DISTAL  RADIUS ;  Surgeon: Danish Pop MD;  [facility];  Service: Orthopedics     Family History   Problem Relation Age of Onset    Cancer Mother         Breast    Breast cancer Mother     Colon cancer Mother     Cancer Father         Colon    Colon cancer Father          Meds/Allergies     Current Outpatient Medications:     buPROPion (WELLBUTRIN XL) 300 mg 24 hr tablet, TAKE 1 TABLET BY MOUTH  DAILY, Disp: 90 tablet, Rfl: 3    Calcium Carbonate-Vit D-Min (CALCIUM 1200 PO), Take by mouth daily, Disp: , Rfl:     cholecalciferol (VITAMIN D3) 25 mcg (1,000 units) tablet, , Disp: , Rfl:     FLUoxetine (PROzac) 20 mg capsule, TAKE 1 CAPSULE BY MOUTH  DAILY, Disp: 90 capsule, Rfl: 3    levothyroxine 200 mcg tablet, TAKE 1 TABLET BY MOUTH  DAILY IN THE EARLY MORNING, Disp: 90 tablet, Rfl: 3    Magnesium 200 MG TABS, Take 1 tablet by mouth daily, Disp: , Rfl:     Multiple Vitamins-Minerals (MULTIVITAMIN ADULT PO), Take 1 tablet by mouth daily, Disp: , Rfl:     Omega-3 Fatty Acids (FISH OIL) 1000 MG CPDR, Take 1 capsule by mouth daily, Disp: , Rfl:     pravastatin (PRAVACHOL) 20 mg tablet, TAKE 1 TABLET BY MOUTH  DAILY, Disp: 90 tablet, Rfl: 3    vitamin E 100 UNIT capsule, Take 1 capsule by mouth daily, Disp: , Rfl:     Current Facility-Administered Medications:     EPINEPHrine PF (ADRENALIN) 1 mg/mL injection 0 5 mg, 0 5 mg, Intramuscular, Once, Jessicaire Sandi Davies MD  Allergies   Allergen Reactions    Erythromycin Shortness Of Breath     Reaction Date: [id number];     Cefprozil      Reaction Date: [id number];     Cephalosporins        Vitals: Blood pressure 110/70, pulse 71, temperature 97 9 °F (36 6 °C), height 5' 6" (1 676 m), weight 101 kg (222 lb 3 2 oz), SpO2 95 %  Body mass index is 35 86 kg/m²  Oxygen Therapy  SpO2: 95 %  Oxygen Therapy: None (Room air)      Physical Exam  General:  Awake alert and oriented x 3, conversant without conversational dyspnea, NAD, normal affect  HEENT:   Sclera noninjected, nonicteric OU, Nares patent,  no craniofacial abnormalities, Mucous membranes, moist, no oral lesions, normal dentition, Mallampati class 4  NECK:  Trachea midline, no accessory muscle use, no stridor,  JVP not elevated  CARDIAC: Reg, single s1/S2, no m/r/g  PULM: CTA bilaterally no wheezing, rhonchi or rales  ABD: Soft nontender, nondistended, no rebound, no rigidity, no guarding  EXT: No cyanosis, no clubbing, no edema, normal capillary refill  NEURO: no focal neurologic deficits, AAOx3, moving all extremities appropriately    Labs: I have personally reviewed pertinent lab results  , ABG: No results found for: PHART, TSN8IIT, PO2ART, DMY0YAZ, K1UAVBVG, BEART, SOURCE, BNP: No results found for: BNP, CBC: No results found for: WBC, HGB, HCT, MCV, PLT, ADJUSTEDWBC, MCH, MCHC, RDW, MPV, NRBC, CMP: No results found for: SODIUM, K, CL, CO2, ANIONGAP, BUN, CREATININE, GLUCOSE, CALCIUM, AST, ALT, ALKPHOS, PROT, BILITOT, EGFR, PT/INR: No results found for: PT, INR, Troponin: No results found for: TROPONINI  Lab Results   Component Value Date    WBC 5 7 02/23/2021    HGB 13 2 02/23/2021    HCT 39 3 02/23/2021    MCV 94 02/23/2021     02/23/2021     Lab Results   Component Value Date    GLUCOSE 111 (H) 01/20/2017    CALCIUM 9 6 02/16/2017     01/20/2017    K 4 4 02/23/2021    CO2 22 02/23/2021  02/23/2021    BUN 12 02/23/2021    CREATININE 0 68 02/23/2021     No results found for: IGE  Lab Results   Component Value Date    ALT 20 02/23/2021    AST 19 02/23/2021    ALKPHOS 81 02/16/2017    BILITOT 0 5 01/20/2017         Sleep studies: I have personally reviewed pertinent reports  HST 9/2021   The patient had increased number of sleep disorder breathing events with an average respiratory event index of 33 6 events per hour, these events were associated with significant oxygen desaturations with an oxygen merlin of 84%  Thus, the patient meets the criteria for diagnosis of severe obstructive sleep apnea  A trial of auto titrating CPAP with a pressure range of 5-20 cm H2O would be recommended, compliance to be reviewed within 6-8 weeks, clinical correlation suggested     Compliance report:I have personally reviewed pertinent reports  Type of CPAP:  Fixed 10 cm H2O                                   Percent usage: 100%                                   Average time used: 9 hrs 45 minutes                                   Leak: not significant                                    Residual AHI: 1 9 events/hr             Arian Jaime MD  [facility]       Portions of the record may have been created with voice recognition software  Occasional wrong word or "sound a like" substitutions may have occurred due to the inherent limitations of voice recognition software  Read the chart carefully and recognize, using context, where substitutions have occurred

## 2022-01-06 NOTE — ASSESSMENT & PLAN NOTE
· She was diagnosed long time ago she underwent another sleep study found to have an AHI of 33 6 events per hour, reestablish her diagnosis  Get a new CPAP machine currently using it every night 100% more than 4 hours average of 9 hours 45 minutes CPAP at 10 cm H2O with residual AHI of 1 9 events per hour  She feels significantly better with the new machine and she is motivated to continue using it  Compliance has been reviewed as detailed below

## 2022-01-06 NOTE — PATIENT INSTRUCTIONS
CPAP   AMBULATORY CARE:   CPAP (continuous positive airway pressure)  is a treatment that uses air pressure to keep your airways open while you sleep  CPAP is used to treat obstructive sleep apnea (DIONE)  A CPAP machine connects the mask to the machine with a hose  Air pressure prevents your airway from collapsing or becoming blocked during sleep  Use your CPAP machine when you sleep, even when you nap  You may need to use a CPAP machine for the rest of your life  Make CPAP easier to use:   · At first, try to use your CPAP for a few hours every night  Then slowly increase the length of time you use your machine  It takes time to adjust to CPAP treatment  · You may need to use a special moisturizer made for CPAP users  You may need a mask that is a different size, shape, or material  Talk to your healthcare provider if your mask feels uncomfortable or irritates your skin  · Use a saline nasal spray at bedtime to help relieve nasal irritation  A chin strap to help keep your mouth closed  A different type of mask can help dry mouth  Some machines come with a heated humidifier to help relieve these symptoms  · Talk to your healthcare provider if you are having problems adjusting to the air pressure  He or she can tell you how to adjust the air pressure on your CPAP  You may need to start at a lower pressure and slowly increase it over time  Call your doctor if:   · You continue to feel very sleepy during the day, even after you wear your CPAP device as directed  · Your CPAP is causing a problem, such as a rash, that does not improve  · You have questions or concerns about your condition, care, or equipment  Follow up with your doctor as directed:  Tell your healthcare provider if your mask no longer fits properly  Write down your questions so you remember to ask them during your visits    © Copyright [employer] 2021 Information is for End User's use only and may not be sold, redistributed or otherwise used for commercial purposes  All illustrations and images included in CareNotes® are the copyrighted property of A D A M [employer] Inc  or Edie Fink  The above information is an  only  It is not intended as medical advice for individual conditions or treatments  Talk to your doctor, nurse or pharmacist before following any medical regimen to see if it is safe and effective for you

## 2022-01-31 ENCOUNTER — OFFICE VISIT (OUTPATIENT)
Dept: GASTROENTEROLOGY | Facility: AMBULARY SURGERY CENTER | Age: 67
End: 2022-01-31
Payer: COMMERCIAL

## 2022-01-31 VITALS
HEIGHT: 66 IN | WEIGHT: 216.8 LBS | DIASTOLIC BLOOD PRESSURE: 78 MMHG | HEART RATE: 91 BPM | SYSTOLIC BLOOD PRESSURE: 124 MMHG | BODY MASS INDEX: 34.84 KG/M2

## 2022-01-31 DIAGNOSIS — K59.00 CONSTIPATION, UNSPECIFIED CONSTIPATION TYPE: ICD-10-CM

## 2022-01-31 DIAGNOSIS — R10.30 LOWER ABDOMINAL PAIN: Primary | ICD-10-CM

## 2022-01-31 DIAGNOSIS — R19.4 CHANGE IN BOWEL HABITS: ICD-10-CM

## 2022-01-31 PROCEDURE — 99214 OFFICE O/P EST MOD 30 MIN: CPT | Performed by: INTERNAL MEDICINE

## 2022-01-31 PROCEDURE — 3008F BODY MASS INDEX DOCD: CPT | Performed by: INTERNAL MEDICINE

## 2022-01-31 NOTE — PATIENT INSTRUCTIONS
Scheduled date of colonoscopy (as of today):4/4/2022  Physician performing colonoscopy:DR HUTSON  Location of colonoscopy:[facility]  Bowel prep reviewed with patient:MIRALAX/MAG CITRATE PER DR AVILA  Instructions reviewed with patient by:ARIELA HSU  Clearances:

## 2022-01-31 NOTE — LETTER
January 31, 2022     Referral [address] [place] [id number]    Patient: Arelis Suarez   YOB: 1955   Date of Visit: 1/31/2022       Dear Dr Isidro Garrido:    Thank you for referring Grzegorz Menjivar to me for evaluation  Below are my notes for this consultation  If you have questions, please do not hesitate to call me  I look forward to following your patient along with you  Sincerely,        Stas Nowak MD        CC: MD Stas Bejarano MD  1/31/2022  5:12 PM  Sign when Signing Visit  Consultation - [facility]  Arelis Suarez 73 y o  female MRN: [id number]  Unit/Bed#:  Encounter: [id number]        Consults    ASSESSMENT/PLAN:     1  Change in bowel habits with abdominal pain-concerning for possible mild diverticulitis flare versus IBS C, however would rule out colonic polyps or malignancy given patient's personal history of adenomatous polyps and family history of colon cancer  Would also check for any metabolic etiologies such as thyroid dysfunction, hypercalcemia etc  Check magnesium levels, calcium levels, celiac serologies and TSH  -would recommend obtaining CT of abdomen and pelvis for further evaluation  -recommend low FODMAP diet  -recommend adding MiraLax on daily basis a and starting on a daily probiotic     -would recommend colonoscopy to rule out any colon polyps or lesions given change in bowel habits and family history and personal history of polyps  -   Patient was explained about  the risks and benefits of the procedure  Risks including but not limited to bleeding, infection, perforation were explained in detail  Also explained about less than 100% sensitivity with the exam and other alternatives          ______________________________________________________________________    Reason for Consult / Principal Problem: [unfilled]    HPI: Arelis Suarez is a 73y o  year old female with history of anxiety, depression, diverticulosis, presents for initial evaluation of change in bowel habits associated with constiaption and abdominal pain  Patient reports that symptoms began almost 6 weeks ago  Reports having fairly regular bowel movements prior to that  She denies any rectal bleeding but does report that she is not passing stools which she describes as [place] stool scale 1, associated with feeling of incomplete evacuation  She also reported left lower quadrant and right lower quadrant abdominal pain on Friday which is now improved  Denies any fever  Mucus in the stool, blood in the stool  She denies any unintentional weight loss  Denies any upper GI symptoms including acid reflux, dysphagia, odynophagia, loss of appetite or early satiety  Denies any change in medications, no recent viral illness, no sick contacts  No change in diet  In does report family history of colon cancer in her father in early 62s and has had history of adenomatous polyps herself  Patient's last colonoscopy was performed in August of 2019 by Dr Danielle Ocasio which was notable for severe diverticulosis involving the sigmoid colon only  She does have history of adenomatous polyps in the past   She was recommended a repeat colonoscopy at 5 year interval       Review of Systems: The remainder of the review of systems was negative except for the pertinent positives noted in HPI       Historical Information   Past Medical History:   Diagnosis Date    Anxiety     Closed fracture of distal radius and ulna, left, initial encounter     Last Assessed:  5/13/16    Depression     Disease of thyroid gland     Essential hypertension     Fibromyalgia     GERD (gastroesophageal reflux disease)     Sleep apnea     pt uses c-pap    Squamous cell carcinoma of skin     Last Assessed:  1/17/17     Past Surgical History:   Procedure Laterality Date    COLONOSCOPY N/A 7/14/2016    Procedure: COLONOSCOPY WITH ENDOCLIPS AND INJECTION FOR HEMOSTASIS;  Surgeon: Elise Nur MD;  Location:  MAIN OR;  Service:     COLONOSCOPY  2019    MOHS RECONSTRUCTION      NASAL FRACTURE SURGERY      DE OPEN RX DISTAL RADIUS FX, INTRA-ARTICULAR, 3+ FRAG Left 2016    Procedure: OPEN REDUCTION W/ INTERNAL FIXATION (ORIF)DISTAL  RADIUS ;  Surgeon: Deisy Terrell MD;  Location: [facility];  Service: Orthopedics     Social History   Social History     Substance and Sexual Activity   Alcohol Use No     Social History     Substance and Sexual Activity   Drug Use No     Social History     Tobacco Use   Smoking Status Former Smoker    Packs/day: 1     Types: Cigarettes    Start date: 0    Quit date:     Years since quittin 0   Smokeless Tobacco Never Used     Family History   Problem Relation Age of Onset   Syliva Christian Cancer Mother         Breast    Breast cancer Mother     Colon cancer Mother     Cancer Father         Colon    Colon cancer Father        Meds/Allergies     (Not in a hospital admission)    Current Facility-Administered Medications   Medication Dose Route Frequency    EPINEPHrine PF (ADRENALIN) 1 mg/mL injection 0 5 mg  0 5 mg Intramuscular Once       Allergies   Allergen Reactions    Erythromycin Shortness Of Breath     Reaction Date: 2011;     Cefprozil      Reaction Date: 2011;     Cephalosporins        Objective     Blood pressure 124/78, pulse 91, height 5' 6" (1 676 m), weight 98 3 kg (216 lb 12 8 oz)  [unfilled]    PHYSICAL EXAM     GEN: well nourished, well developed, no acute distress  HEENT: anicteric, MMM, no cervical or supraclavicular lymphadenopathy  CV: RRR, no m/r/g  CHEST: CTA b/l, no WRR  ABD: +BS, soft, NT/ND, no hepatosplenomegaly  EXT: no c/c/e  SKIN: no rashes,  NEURO: aaox3    Lab Results:   No visits with results within 1 Day(s) from this visit     Latest known visit with results is:   Annual Exam on 10/14/2021   Component Date Value    Case Report 10/14/2021 Value:Gynecologic Cytology Report                       Case: [id number]                                  Authorizing Provider:  Dustin Pereira MD            Collected:           10/14/2021 1050              Ordering Location:     [facility]   Received:            10/14/2021 1050              First Screen:          Robe President, CT                                                       Specimen:    LIQUID-BASED PAP, SCREENING, Cervix, Endocervical                                          Primary Interpretation 10/14/2021 Negative for intraepithelial lesion or malignancy     Specimen Adequacy 10/14/2021 Satisfactory for evaluation  (See note)     Note 10/14/2021                      Value: This result contains rich text formatting which cannot be displayed here   Additional Information 10/14/2021                      Value: This result contains rich text formatting which cannot be displayed here   HPV Other HR 10/14/2021 Negative     HPV16 10/14/2021 Negative     HPV18 10/14/2021 Negative      Imaging Studies: I have personally reviewed pertinent films in PACS                Answers for HPI/ROS submitted by the patient on 1/28/2022  Chronicity: new  Onset: 1 to 4 weeks ago  Onset quality: gradual  Frequency: constantly  Progression since onset: waxing and waning  Pain location: left flank, right flank  Pain - numeric: 5/10  Pain quality: cramping, a sensation of fullness  Radiates to: LLQ, RLQ  anorexia: No  arthralgias: Yes  belching: No  constipation: Yes  diarrhea: No  dysuria: No  fever: No  flatus: No  frequency: Yes  headaches: No  hematochezia: No  hematuria: No  melena: No  myalgias: Yes  nausea:  No  weight loss: No  vomiting: No  Aggravated by: movement  Relieved by: bowel movements

## 2022-01-31 NOTE — PROGRESS NOTES
Consultation - [facility]) [facility]  Elver Lujan 73 y o  female MRN: [id number]  Unit/Bed#:  Encounter: [id number]        Consults    ASSESSMENT/PLAN:     1  Change in bowel habits with abdominal pain-concerning for possible mild diverticulitis flare versus IBS C, however would rule out colonic polyps or malignancy given patient's personal history of adenomatous polyps and family history of colon cancer  Would also check for any metabolic etiologies such as thyroid dysfunction, hypercalcemia etc  Check magnesium levels, calcium levels, celiac serologies and TSH  -would recommend obtaining CT of abdomen and pelvis for further evaluation  -recommend low FODMAP diet  -recommend adding MiraLax on daily basis a and starting on a daily probiotic     -would recommend colonoscopy to rule out any colon polyps or lesions given change in bowel habits and family history and personal history of polyps  -   Patient was explained about  the risks and benefits of the procedure  Risks including but not limited to bleeding, infection, perforation were explained in detail  Also explained about less than 100% sensitivity with the exam and other alternatives  ______________________________________________________________________    Reason for Consult / Principal Problem: [unfilled]    HPI: Elver Lujan is a 73y o  year old female with history of anxiety, depression, diverticulosis, presents for initial evaluation of change in bowel habits associated with constiaption and abdominal pain  Patient reports that symptoms began almost 6 weeks ago  Reports having fairly regular bowel movements prior to that  She denies any rectal bleeding but does report that she is not passing stools which she describes as [place] stool scale 1, associated with feeling of incomplete evacuation  She also reported left lower quadrant and right lower quadrant abdominal pain on Friday which is now improved  Denies any fever  Mucus in the stool, blood in the stool  She denies any unintentional weight loss  Denies any upper GI symptoms including acid reflux, dysphagia, odynophagia, loss of appetite or early satiety  Denies any change in medications, no recent viral illness, no sick contacts  No change in diet  In does report family history of colon cancer in her father in early 62s and has had history of adenomatous polyps herself  Patient's last colonoscopy was performed in August of 2019 by Dr Lemuel Rios which was notable for severe diverticulosis involving the sigmoid colon only  She does have history of adenomatous polyps in the past   She was recommended a repeat colonoscopy at 5 year interval       Review of Systems: The remainder of the review of systems was negative except for the pertinent positives noted in HPI       Historical Information   Past Medical History:   Diagnosis Date    Anxiety     Closed fracture of distal radius and ulna, left, initial encounter     Last Assessed:  5/13/16    Depression     Disease of thyroid gland     Essential hypertension     Fibromyalgia     GERD (gastroesophageal reflux disease)     Sleep apnea     pt uses c-pap    Squamous cell carcinoma of skin     Last Assessed:  1/17/17     Past Surgical History:   Procedure Laterality Date    COLONOSCOPY N/A 7/14/2016    Procedure: COLONOSCOPY WITH ENDOCLIPS AND INJECTION FOR HEMOSTASIS;  Surgeon: Paul Trinh MD;  [facility];  Service:     COLONOSCOPY  08/2019    MOHS RECONSTRUCTION      NASAL FRACTURE SURGERY      WI OPEN RX DISTAL RADIUS FX, INTRA-ARTICULAR, 3+ FRAG Left 2/25/2016    Procedure: OPEN REDUCTION W/ INTERNAL FIXATION (ORIF)DISTAL  RADIUS ;  Surgeon: Juanita Servin MD;  Location: [facility];  Service: Orthopedics     Social History   Social History     Substance and Sexual Activity   Alcohol Use No     Social History     Substance and Sexual Activity   Drug Use No     Social History     Tobacco Use   Smoking Status Former Smoker    Packs/day: 1 00    Types: Cigarettes    Start date: 0    Quit date:     Years since quittin 0   Smokeless Tobacco Never Used     Family History   Problem Relation Age of Onset   Velvet Chakraborty Cancer Mother         Breast    Breast cancer Mother     Colon cancer Mother     Cancer Father         Colon    Colon cancer Father        Meds/Allergies     (Not in a hospital admission)    Current Facility-Administered Medications   Medication Dose Route Frequency    EPINEPHrine PF (ADRENALIN) 1 mg/mL injection 0 5 mg  0 5 mg Intramuscular Once       Allergies   Allergen Reactions    Erythromycin Shortness Of Breath     Reaction Date: 2011;     Cefprozil      Reaction Date: 2011;     Cephalosporins        Objective     Blood pressure 124/78, pulse 91, height 5' 6" (1 676 m), weight 98 3 kg (216 lb 12 8 oz)  [unfilled]    PHYSICAL EXAM     GEN: well nourished, well developed, no acute distress  HEENT: anicteric, MMM, no cervical or supraclavicular lymphadenopathy  CV: RRR, no m/r/g  CHEST: CTA b/l, no WRR  ABD: +BS, soft, NT/ND, no hepatosplenomegaly  EXT: no c/c/e  SKIN: no rashes,  NEURO: aaox3    Lab Results:   No visits with results within 1 Day(s) from this visit     Latest known visit with results is:   Annual Exam on 10/14/2021   Component Date Value    Case Report 10/14/2021                      Value:Gynecologic Cytology Report                       Case: Kenny Vo Provider:  Luis Eduardo Guerra MD            Collected:           10/14/2021 1050              Ordering Location:     [facility] [address]  Received:            10/14/2021 1050              First Screen:          MARY Mcguire                                                       Specimen:    LIQUID-BASED PAP, SCREENING, Cervix, Endocervical                                          Primary Interpretation 10/14/2021 Negative for intraepithelial lesion or malignancy     Specimen Adequacy 10/14/2021 Satisfactory for evaluation  (See note)     Note 10/14/2021                      Value: This result contains rich text formatting which cannot be displayed here   Additional Information 10/14/2021                      Value: This result contains rich text formatting which cannot be displayed here   HPV Other HR 10/14/2021 Negative     HPV16 10/14/2021 Negative     HPV18 10/14/2021 Negative      Imaging Studies: I have personally reviewed pertinent films in PACS                Answers for HPI/ROS submitted by the patient on 1/28/2022  Chronicity: new  Onset: 1 to 4 weeks ago  Onset quality: gradual  Frequency: constantly  Progression since onset: waxing and waning  Pain location: left flank, right flank  Pain - numeric: 5/10  Pain quality: cramping, a sensation of fullness  Radiates to: LLQ, RLQ  anorexia: No  arthralgias: Yes  belching: No  constipation: Yes  diarrhea: No  dysuria: No  fever: No  flatus: No  frequency: Yes  headaches: No  hematochezia: No  hematuria: No  melena: No  myalgias: Yes  nausea:  No  weight loss: No  vomiting: No  Aggravated by: movement  Relieved by: bowel movements

## 2022-02-03 LAB
ALBUMIN SERPL-MCNC: 4.2 G/DL (ref 3.8–4.8)
ALBUMIN/GLOB SERPL: 1.9 {RATIO} (ref 1.2–2.2)
ALP SERPL-CCNC: 89 IU/L (ref 44–121)
ALT SERPL-CCNC: 22 IU/L (ref 0–32)
AST SERPL-CCNC: 20 IU/L (ref 0–40)
BILIRUB SERPL-MCNC: 0.3 MG/DL (ref 0–1.2)
BUN SERPL-MCNC: 11 MG/DL (ref 8–27)
BUN/CREAT SERPL: 17 (ref 12–28)
CALCIUM SERPL-MCNC: 9.7 MG/DL (ref 8.7–10.3)
CHLORIDE SERPL-SCNC: 104 MMOL/L (ref 96–106)
CO2 SERPL-SCNC: 22 MMOL/L (ref 20–29)
CREAT SERPL-MCNC: 0.65 MG/DL (ref 0.57–1)
ENDOMYSIUM IGA SER QL: NEGATIVE
GLIADIN PEPTIDE IGA SER-ACNC: 4 UNITS (ref 0–19)
GLIADIN PEPTIDE IGG SER-ACNC: 1 UNITS (ref 0–19)
GLOBULIN SER-MCNC: 2.2 G/DL (ref 1.5–4.5)
GLUCOSE SERPL-MCNC: 105 MG/DL (ref 65–99)
IGA SERPL-MCNC: 242 MG/DL (ref 87–352)
MAGNESIUM SERPL-MCNC: 2.3 MG/DL (ref 1.6–2.3)
POTASSIUM SERPL-SCNC: 4.6 MMOL/L (ref 3.5–5.2)
PROT SERPL-MCNC: 6.4 G/DL (ref 6–8.5)
SL AMB EGFR AFRICAN AMERICAN: 108 ML/MIN/1.73
SL AMB EGFR NON AFRICAN AMERICAN: 93 ML/MIN/1.73
SODIUM SERPL-SCNC: 141 MMOL/L (ref 134–144)
TSH SERPL DL<=0.005 MIU/L-ACNC: 0.12 UIU/ML (ref 0.45–4.5)
TTG IGA SER-ACNC: <2 U/ML (ref 0–3)
TTG IGG SER-ACNC: <2 U/ML (ref 0–5)

## 2022-02-04 ENCOUNTER — RA CDI HCC (OUTPATIENT)
Dept: OTHER | Facility: HOSPITAL | Age: 67
End: 2022-02-04

## 2022-02-04 NOTE — PROGRESS NOTES
[facility]  coding opportunities       Chart reviewed, no opportunity found: CHART REVIEWED, NO OPPORTUNITY FOUND                        Patients insurance company: [facility] [address]  (Medicare Advantage and Commercial)

## 2022-02-10 ENCOUNTER — OFFICE VISIT (OUTPATIENT)
Dept: FAMILY MEDICINE CLINIC | Facility: CLINIC | Age: 67
End: 2022-02-10
Payer: COMMERCIAL

## 2022-02-10 VITALS
SYSTOLIC BLOOD PRESSURE: 108 MMHG | DIASTOLIC BLOOD PRESSURE: 70 MMHG | BODY MASS INDEX: 34.72 KG/M2 | WEIGHT: 216 LBS | HEIGHT: 66 IN | HEART RATE: 76 BPM | TEMPERATURE: 97.6 F | RESPIRATION RATE: 16 BRPM

## 2022-02-10 DIAGNOSIS — Z23 ENCOUNTER FOR IMMUNIZATION: ICD-10-CM

## 2022-02-10 DIAGNOSIS — E78.2 MIXED HYPERLIPIDEMIA: ICD-10-CM

## 2022-02-10 DIAGNOSIS — F33.0 MILD EPISODE OF RECURRENT MAJOR DEPRESSIVE DISORDER (HCC): ICD-10-CM

## 2022-02-10 DIAGNOSIS — F33.42 RECURRENT MAJOR DEPRESSIVE DISORDER, IN FULL REMISSION (HCC): ICD-10-CM

## 2022-02-10 DIAGNOSIS — R73.01 IMPAIRED FASTING GLUCOSE: ICD-10-CM

## 2022-02-10 DIAGNOSIS — E78.00 HYPERCHOLESTEREMIA: ICD-10-CM

## 2022-02-10 DIAGNOSIS — E03.9 ACQUIRED HYPOTHYROIDISM: ICD-10-CM

## 2022-02-10 DIAGNOSIS — Z00.00 WELL ADULT EXAM: Primary | ICD-10-CM

## 2022-02-10 DIAGNOSIS — K76.0 FATTY LIVER: ICD-10-CM

## 2022-02-10 PROCEDURE — 90471 IMMUNIZATION ADMIN: CPT

## 2022-02-10 PROCEDURE — 99397 PER PM REEVAL EST PAT 65+ YR: CPT | Performed by: FAMILY MEDICINE

## 2022-02-10 PROCEDURE — 4040F PNEUMOC VAC/ADMIN/RCVD: CPT | Performed by: FAMILY MEDICINE

## 2022-02-10 PROCEDURE — 1160F RVW MEDS BY RX/DR IN RCRD: CPT | Performed by: FAMILY MEDICINE

## 2022-02-10 PROCEDURE — 1036F TOBACCO NON-USER: CPT | Performed by: FAMILY MEDICINE

## 2022-02-10 PROCEDURE — 90732 PPSV23 VACC 2 YRS+ SUBQ/IM: CPT

## 2022-02-10 PROCEDURE — 3008F BODY MASS INDEX DOCD: CPT | Performed by: FAMILY MEDICINE

## 2022-02-10 PROCEDURE — 1101F PT FALLS ASSESS-DOCD LE1/YR: CPT | Performed by: FAMILY MEDICINE

## 2022-02-10 PROCEDURE — 3288F FALL RISK ASSESSMENT DOCD: CPT | Performed by: FAMILY MEDICINE

## 2022-02-10 RX ORDER — PRAVASTATIN SODIUM 20 MG
20 TABLET ORAL DAILY
Qty: 90 TABLET | Refills: 3 | Status: SHIPPED | OUTPATIENT
Start: 2022-02-10

## 2022-02-10 RX ORDER — BUPROPION HYDROCHLORIDE 300 MG/1
300 TABLET ORAL DAILY
Qty: 90 TABLET | Refills: 3 | Status: SHIPPED | OUTPATIENT
Start: 2022-02-10

## 2022-02-10 RX ORDER — LEVOTHYROXINE SODIUM 175 UG/1
175 TABLET ORAL
Qty: 90 TABLET | Refills: 3 | Status: SHIPPED | OUTPATIENT
Start: 2022-02-10

## 2022-02-10 RX ORDER — FLUOXETINE HYDROCHLORIDE 20 MG/1
20 CAPSULE ORAL DAILY
Qty: 90 CAPSULE | Refills: 3 | Status: SHIPPED | OUTPATIENT
Start: 2022-02-10

## 2022-02-10 NOTE — PROGRESS NOTES
Assessment/Plan:         Diagnoses and all orders for this visit:    Well adult exam    Acquired hypothyroidism  -     levothyroxine 175 mcg tablet; Take 1 tablet (175 mcg total) by mouth daily in the early morning  -     TSH, 3rd generation with Free T4 reflex    Mixed hyperlipidemia  -     Lipid panel        -     pravastatin (PRAVACHOL) 20 mg tablet; Take 1 tablet (20 mg total) by mouth daily  Fatty liver  -     CBC and differential    Impaired fasting glucose  -     Hemoglobin A1C    Recurrent major depressive disorder, in full remission (HCC)  -     buPROPion (WELLBUTRIN XL) 300 mg 24 hr tablet; Take 1 tablet (300 mg total) by mouth daily        -     FLUoxetine (PROzac) 20 mg capsule; Take 1 capsule (20 mg total) by mouth daily    Encounter for immunization  -     PNEUMOCOCCAL POLYSACCHARIDE VACCINE 23-VALENT =>3YO SQ IM            Decrease Levothyroxine to 175 mcg daily  Repeat TSH in 6 weeks  I included CBC, A1c and lipid profile  Follow-up with GI  Office visit 1 year  Pneumovax 23 today    BMI Counseling: Body mass index is 34 86 kg/m²  The BMI is above normal  Nutrition recommendations include reducing portion sizes, decreasing overall calorie intake, consuming healthier snacks, moderation in carbohydrate intake, reducing intake of saturated fat and trans fat and reducing intake of cholesterol  Exercise recommendations include exercising 3-5 times per week  Patient ID: Cesar Rojas is a 73 y o  female  73year old female here for Wellness exam   Medications reviewed Hospitalizations/surgeries left distal radius fracture ORIF  Nasal fracture surgery  Mohs surgery  Family history breast cancer mother  Colon cancer parents  Social history former smoker quit 2002  Hyperlipidemia mixed type on pravastatin 20 mg daily  Last lipid profile 09/2021 cholesterol 190  Triglycerides 201  HDL 60  LDL 96  02/2022 LFTs normal  Impaired fasting glucose 02/2022   02/2021 A1c 5 5    Mammogram 09/2021     Recent Results (from the past 672 hour(s))   Celiac Disease Antibody Profile    Collection Time: 02/02/22  7:24 AM   Result Value Ref Range    Gliadin IgA 4 0 - 19 units    Gliadin IgG 1 0 - 19 units    TISSUE TRANSGLUTAMINASE IGA <2 0 - 3 U/mL    Tissue Transglut Ab IGG <2 0 - 5 U/mL    Endomysial IgA Negative Negative    IgA 242 87 - 352 mg/dL   Comprehensive metabolic panel    Collection Time: 02/02/22  7:24 AM   Result Value Ref Range    Glucose, Random 105 (H) 65 - 99 mg/dL    BUN 11 8 - 27 mg/dL    Creatinine 0 65 0 57 - 1 00 mg/dL    eGFR Non  93 >59 mL/min/1 73    eGFR  108 >59 mL/min/1 73    SL AMB BUN/CREATININE RATIO 17 12 - 28    Sodium 141 134 - 144 mmol/L    Potassium 4 6 3 5 - 5 2 mmol/L    Chloride 104 96 - 106 mmol/L    CO2 22 20 - 29 mmol/L    CALCIUM 9 7 8 7 - 10 3 mg/dL    Protein, Total 6 4 6 0 - 8 5 g/dL    Albumin 4 2 3 8 - 4 8 g/dL    Globulin, Total 2 2 1 5 - 4 5 g/dL    Albumin/Globulin Ratio 1 9 1 2 - 2 2    TOTAL BILIRUBIN 0 3 0 0 - 1 2 mg/dL    Alk Phos Isoenzymes 89 44 - 121 IU/L    AST 20 0 - 40 IU/L    ALT 22 0 - 32 IU/L   TSH, 3rd generation with Free T4 reflex    Collection Time: 02/02/22  7:24 AM   Result Value Ref Range    TSH 0 116 (L) 0 450 - 4 500 uIU/mL   Magnesium    Collection Time: 02/02/22  7:24 AM   Result Value Ref Range    Magnesium, Serum 2 3 1 6 - 2 3 mg/dL     Lab Results   Component Value Date    CHOLESTEROL 190 02/23/2021    CHOLESTEROL 181 09/25/2019    CHOLESTEROL 210 (H) 01/28/2019     Lab Results   Component Value Date    HDL 60 02/23/2021    HDL 61 09/25/2019    HDL 66 01/28/2019     Lab Results   Component Value Date    TRIG 201 (H) 02/23/2021    TRIG 154 (H) 09/25/2019    TRIG 331 (H) 01/28/2019     Lab Results   Component Value Date    LDLCALC 96 02/23/2021             The following portions of the patient's history were reviewed and updated as appropriate: allergies, current medications, past family history, past medical history, past social history, past surgical history and problem list     Review of Systems   Constitutional: Positive for unexpected weight change (5 lb weight loss from 01/2022)  Negative for appetite change, chills, fatigue and fever  HENT: Negative for congestion, ear pain, rhinorrhea, sore throat and trouble swallowing  Eyes: Negative for visual disturbance  Respiratory: Positive for apnea  Negative for cough, shortness of breath and wheezing  DIONE on CPAP   Cardiovascular: Negative for chest pain, palpitations and leg swelling  Gastrointestinal: Positive for constipation  Negative for abdominal pain, blood in stool, diarrhea, nausea and vomiting  01/2022 GI evaluation for constipation/lower abdominal pain  On daily MiraLax  She is scheduled for repeat colonoscopy and CT scan abdomen/pelvis  Colonoscopy 08/2019 normal except for severe diverticulosis sigmoid colon  + FH colon cancer    Endocrine:        Hypothyroidism on Levothyroxine 200 mcg daily  02/2022 TSH low at 0 116  DEXA scan 03/2021 DEXA scan osteopenia left distal forearm  Lumbar spine and femur demonstrate normal BMD   Slight decrease in overall BMD from prior study 02/2019  On MVI daily    Genitourinary: Negative for difficulty urinating  GYN and pap smear 10/2021   Musculoskeletal: Negative for arthralgias and myalgias  S/p fracture left distal radius 02/2016 secondary to fall,   Skin: Negative for rash  s/p resection SCC right face followed by Dermatology  Chronic hives with prior episodes of swelling of lips and tongue  Prior allergist evaluation  Food allergy panel 07/2017 + shrimp  no reaction to shrimp  Repeat allergy testing 2018 + trees, grass, dust and eggs  No improvement with Allegra  She has an Epi Pen  Allergic/Immunologic: Positive for food allergies  Neurological: Negative for dizziness and headaches  Hematological: Negative for adenopathy  Does not bruise/bleed easily  Psychiatric/Behavioral: Positive for dysphoric mood  Negative for sleep disturbance and suicidal ideas  The patient is not nervous/anxious  Longstanding history of depression stable on Wellbutrin  mg/day and Prozac 20 mg daily  History of alcoholism with prior in patient rehab  She had been attending AA meetings until onset of COV 19  She relapsed and started drinking again  She opted to pursue out patient treatment for her alcoholism  She has started back with AA meetings  She completed a partial out program 3 days a week at [email or web address]  Objective:    /70   Pulse 76   Temp 97 6 °F (36 4 °C)   Resp 16   Ht 5' 6" (1 676 m)   Wt 98 kg (216 lb)   BMI 34 86 kg/m²     BP Readings from Last 3 Encounters:   02/10/22 108/70   01/31/22 124/78   01/06/22 110/70     Wt Readings from Last 3 Encounters:   02/10/22 98 kg (216 lb)   01/31/22 98 3 kg (216 lb 12 8 oz)   01/06/22 101 kg (222 lb 3 2 oz)          Physical Exam  Vitals and nursing note reviewed  Constitutional:       General: She is not in acute distress  Appearance: She is well-developed  HENT:      Right Ear: Tympanic membrane and ear canal normal       Left Ear: Tympanic membrane and ear canal normal    Eyes:      General: No scleral icterus  Extraocular Movements: Extraocular movements intact  Conjunctiva/sclera: Conjunctivae normal       Pupils: Pupils are equal, round, and reactive to light  Neck:      Thyroid: No thyroid mass or thyromegaly  Vascular: No carotid bruit or JVD  Trachea: No tracheal deviation  Cardiovascular:      Rate and Rhythm: Normal rate and regular rhythm  Heart sounds: Normal heart sounds  No murmur heard  No gallop  Pulmonary:      Effort: Pulmonary effort is normal  No respiratory distress  Breath sounds: Normal breath sounds  No wheezing or rales  Chest:   Breasts:      Right: No supraclavicular adenopathy  Left: No supraclavicular adenopathy  Abdominal:      General: Bowel sounds are normal  There is no distension or abdominal bruit  Palpations: Abdomen is soft  There is no hepatomegaly, splenomegaly or mass  Tenderness: There is no abdominal tenderness  There is no guarding or rebound  Musculoskeletal:      Right lower leg: No edema  Left lower leg: No edema  Lymphadenopathy:      Cervical: No cervical adenopathy  Upper Body:      Right upper body: No supraclavicular adenopathy  Left upper body: No supraclavicular adenopathy  Skin:     Findings: No rash  Nails: There is no clubbing  Neurological:      General: No focal deficit present  Mental Status: She is alert and oriented to person, place, and time  Psychiatric:         Mood and Affect: Mood normal          Behavior: Behavior normal          Thought Content:  Thought content normal          Judgment: Judgment normal

## 2022-03-17 ENCOUNTER — HOSPITAL ENCOUNTER (OUTPATIENT)
Dept: CT IMAGING | Facility: HOSPITAL | Age: 67
Discharge: HOME | End: 2022-03-17
Attending: INTERNAL MEDICINE
Payer: COMMERCIAL

## 2022-03-17 DIAGNOSIS — R10.30 LOWER ABDOMINAL PAIN: ICD-10-CM

## 2022-03-17 PROCEDURE — G1004 CDSM NDSC: HCPCS

## 2022-03-17 PROCEDURE — 74177 CT ABD & PELVIS W/CONTRAST: CPT

## 2022-03-17 RX ADMIN — IOHEXOL 100 ML: 350 INJECTION, SOLUTION INTRAVENOUS at 17:51

## 2022-03-24 ENCOUNTER — TELEPHONE (OUTPATIENT)
Dept: GASTROENTEROLOGY | Facility: AMBULARY SURGERY CENTER | Age: 67
End: 2022-03-24

## 2022-03-24 NOTE — TELEPHONE ENCOUNTER
Patients GI provider:  Dr Kyrie Stephenson    Number to return call: ( [phone]    Reason for call: Pt calling to get cat scan results done on 3-17-22    Scheduled procedure/appointment date if applicable: procedure 0-3-21

## 2022-03-30 LAB
BASOPHILS # BLD AUTO: 0.1 X10E3/UL (ref 0–0.2)
BASOPHILS NFR BLD AUTO: 1 %
CHOLEST SERPL-MCNC: 159 MG/DL (ref 100–199)
CHOLEST/HDLC SERPL: 3.1 RATIO (ref 0–4.4)
EOSINOPHIL # BLD AUTO: 0.2 X10E3/UL (ref 0–0.4)
EOSINOPHIL NFR BLD AUTO: 3 %
ERYTHROCYTE [DISTWIDTH] IN BLOOD BY AUTOMATED COUNT: 12 % (ref 11.7–15.4)
EST. AVERAGE GLUCOSE BLD GHB EST-MCNC: 120 MG/DL
HBA1C MFR BLD: 5.8 % (ref 4.8–5.6)
HCT VFR BLD AUTO: 39.9 % (ref 34–46.6)
HDLC SERPL-MCNC: 51 MG/DL
HGB BLD-MCNC: 13.2 G/DL (ref 11.1–15.9)
IMM GRANULOCYTES # BLD: 0 X10E3/UL (ref 0–0.1)
IMM GRANULOCYTES NFR BLD: 1 %
LDLC SERPL CALC-MCNC: 80 MG/DL (ref 0–99)
LYMPHOCYTES # BLD AUTO: 2.2 X10E3/UL (ref 0.7–3.1)
LYMPHOCYTES NFR BLD AUTO: 28 %
MCH RBC QN AUTO: 29.5 PG (ref 26.6–33)
MCHC RBC AUTO-ENTMCNC: 33.1 G/DL (ref 31.5–35.7)
MCV RBC AUTO: 89 FL (ref 79–97)
MONOCYTES # BLD AUTO: 0.8 X10E3/UL (ref 0.1–0.9)
MONOCYTES NFR BLD AUTO: 10 %
NEUTROPHILS # BLD AUTO: 4.6 X10E3/UL (ref 1.4–7)
NEUTROPHILS NFR BLD AUTO: 57 %
PLATELET # BLD AUTO: 260 X10E3/UL (ref 150–450)
RBC # BLD AUTO: 4.48 X10E6/UL (ref 3.77–5.28)
SL AMB VLDL CHOLESTEROL CALC: 28 MG/DL (ref 5–40)
TRIGL SERPL-MCNC: 165 MG/DL (ref 0–149)
TSH SERPL DL<=0.005 MIU/L-ACNC: 0.1 UIU/ML (ref 0.45–4.5)
WBC # BLD AUTO: 7.8 X10E3/UL (ref 3.4–10.8)

## 2022-04-04 ENCOUNTER — ANESTHESIA EVENT (OUTPATIENT)
Dept: GASTROENTEROLOGY | Facility: AMBULARY SURGERY CENTER | Age: 67
End: 2022-04-04

## 2022-04-04 ENCOUNTER — ANESTHESIA (OUTPATIENT)
Dept: GASTROENTEROLOGY | Facility: AMBULARY SURGERY CENTER | Age: 67
End: 2022-04-04

## 2022-04-04 ENCOUNTER — HOSPITAL ENCOUNTER (OUTPATIENT)
Dept: GASTROENTEROLOGY | Facility: AMBULARY SURGERY CENTER | Age: 67
Setting detail: OUTPATIENT SURGERY
Discharge: HOME | End: 2022-04-04
Attending: INTERNAL MEDICINE | Admitting: INTERNAL MEDICINE
Payer: COMMERCIAL

## 2022-04-04 VITALS
SYSTOLIC BLOOD PRESSURE: 120 MMHG | DIASTOLIC BLOOD PRESSURE: 71 MMHG | HEART RATE: 61 BPM | OXYGEN SATURATION: 97 % | RESPIRATION RATE: 22 BRPM | TEMPERATURE: 97.2 F

## 2022-04-04 DIAGNOSIS — R10.30 LOWER ABDOMINAL PAIN: ICD-10-CM

## 2022-04-04 DIAGNOSIS — K59.00 CONSTIPATION, UNSPECIFIED CONSTIPATION TYPE: ICD-10-CM

## 2022-04-04 PROCEDURE — 45378 DIAGNOSTIC COLONOSCOPY: CPT | Performed by: INTERNAL MEDICINE

## 2022-04-04 RX ORDER — PROPOFOL 10 MG/ML
INJECTION, EMULSION INTRAVENOUS CONTINUOUS PRN
Status: DISCONTINUED | OUTPATIENT
Start: 2022-04-04 | End: 2022-04-04

## 2022-04-04 RX ORDER — PROPOFOL 10 MG/ML
INJECTION, EMULSION INTRAVENOUS AS NEEDED
Status: DISCONTINUED | OUTPATIENT
Start: 2022-04-04 | End: 2022-04-04

## 2022-04-04 RX ORDER — SODIUM CHLORIDE, SODIUM LACTATE, POTASSIUM CHLORIDE, CALCIUM CHLORIDE 600; 310; 30; 20 MG/100ML; MG/100ML; MG/100ML; MG/100ML
INJECTION, SOLUTION INTRAVENOUS CONTINUOUS PRN
Status: DISCONTINUED | OUTPATIENT
Start: 2022-04-04 | End: 2022-04-04

## 2022-04-04 RX ORDER — ONDANSETRON 2 MG/ML
4 INJECTION INTRAMUSCULAR; INTRAVENOUS ONCE AS NEEDED
Status: CANCELLED | OUTPATIENT
Start: 2022-04-04

## 2022-04-04 RX ORDER — SODIUM CHLORIDE, SODIUM LACTATE, POTASSIUM CHLORIDE, CALCIUM CHLORIDE 600; 310; 30; 20 MG/100ML; MG/100ML; MG/100ML; MG/100ML
125 INJECTION, SOLUTION INTRAVENOUS CONTINUOUS
Status: DISCONTINUED | OUTPATIENT
Start: 2022-04-04 | End: 2022-04-08 | Stop reason: HOSPADM

## 2022-04-04 RX ADMIN — PROPOFOL 150 MG: 10 INJECTION, EMULSION INTRAVENOUS at 14:00

## 2022-04-04 RX ADMIN — SODIUM CHLORIDE, SODIUM LACTATE, POTASSIUM CHLORIDE, AND CALCIUM CHLORIDE: .6; .31; .03; .02 INJECTION, SOLUTION INTRAVENOUS at 13:56

## 2022-04-04 RX ADMIN — PROPOFOL 50 MG: 10 INJECTION, EMULSION INTRAVENOUS at 14:07

## 2022-04-04 RX ADMIN — PROPOFOL 120 MCG/KG/MIN: 10 INJECTION, EMULSION INTRAVENOUS at 14:02

## 2022-04-04 RX ADMIN — PROPOFOL 50 MG: 10 INJECTION, EMULSION INTRAVENOUS at 14:17

## 2022-04-04 RX ADMIN — SODIUM CHLORIDE, SODIUM LACTATE, POTASSIUM CHLORIDE, AND CALCIUM CHLORIDE 125 ML/HR: .6; .31; .03; .02 INJECTION, SOLUTION INTRAVENOUS at 12:54

## 2022-04-04 RX ADMIN — PROPOFOL 50 MG: 10 INJECTION, EMULSION INTRAVENOUS at 14:12

## 2022-04-04 RX ADMIN — PROPOFOL 50 MG: 10 INJECTION, EMULSION INTRAVENOUS at 14:02

## 2022-04-04 NOTE — ANESTHESIA POSTPROCEDURE EVALUATION
Post-Op Assessment Note    CV Status:  Stable  Pain Score: 0    Pain management: adequate     Mental Status:  Alert and awake   Hydration Status:  Euvolemic   PONV Controlled:  Controlled   Airway Patency:  Patent      Post Op Vitals Reviewed: Yes      Staff: CRNA, Anesthesiologist   Comments: vss        No complications documented      BP   111/77   Temp     Pulse 76   Resp   16   SpO2   100

## 2022-04-04 NOTE — H&P
History and Physical - [facility]  Alejandro Price 73 y o  female MRN: [id number]    HPI: Alejadnro Price is a 73y o  year old female who presents S for evaluation of change in bowel habits        Review of Systems    Historical Information   Past Medical History:   Diagnosis Date    Anxiety     Closed fracture of distal radius and ulna, left, initial encounter     Last Assessed:  16    CPAP (continuous positive airway pressure) dependence     Depression     Disease of thyroid gland     Fibromyalgia     GERD (gastroesophageal reflux disease)     Hyperlipidemia     Sleep apnea     pt uses c-pap    Squamous cell carcinoma of skin     Last Assessed:  17     Past Surgical History:   Procedure Laterality Date    COLONOSCOPY N/A 2016    Procedure: COLONOSCOPY WITH ENDOCLIPS AND INJECTION FOR HEMOSTASIS;  Surgeon: Martha Barone MD;  Location: [facility];  Service:     COLONOSCOPY  2019    MOHS RECONSTRUCTION      NASAL FRACTURE SURGERY      OK OPEN RX DISTAL RADIUS FX, INTRA-ARTICULAR, 3+ FRAG Left 2016    Procedure: OPEN REDUCTION W/ INTERNAL FIXATION (ORIF)DISTAL  RADIUS ;  Surgeon: Patrizia Méndez MD;  Location: [facility];  Service: Orthopedics     Social History   Social History     Substance and Sexual Activity   Alcohol Use No     Social History     Substance and Sexual Activity   Drug Use No     Social History     Tobacco Use   Smoking Status Former Smoker    Packs/day: 1 00    Types: Cigarettes    Start date: 0    Quit date:     Years since quittin 2   Smokeless Tobacco Never Used     Family History   Problem Relation Age of Onset   Radha Cordoba Cancer Mother         Breast    Breast cancer Mother     Colon cancer Mother     Cancer Father         Colon    Colon cancer Father        Meds/Allergies     (Not in a hospital admission)      Allergies   Allergen Reactions    Erythromycin Shortness Of Breath     Reaction Date: ;    Radha Cordoba Cefprozil      Reaction Date: [id number];     Cephalosporins        Objective     /71   Pulse (!) 54   Temp (!) 97 2 °F (36 2 °C) (Temporal)   Resp 18   SpO2 97%       PHYSICAL EXAM    Gen: NAD  CV: RRR  CHEST: Clear  ABD: soft, NT/ND  EXT: no edema  Neuro: AAO      ASSESSMENT/PLAN:  This is a 73y o  year old female here for change in bowel habits, denies any abdominal pain at this time  CT scan results were reviewed  Patient was explained about  the risks and benefits of the procedure  Risks including but not limited to bleeding, infection, perforation were explained in detail  Also explained about less than 100% sensitivity with the exam and other alternatives  PLAN:   Procedure:  Colonoscopy

## 2022-04-04 NOTE — ANESTHESIA PREPROCEDURE EVALUATION
Procedure:  COLONOSCOPY    Relevant Problems   ANESTHESIA (within normal limits)      CARDIO   (+) Hyperlipidemia      ENDO   (+) Hypothyroidism      GI/HEPATIC   (+) Fatty liver      NEURO/PSYCH   (+) Depression      PULMONARY   (+) DIONE on CPAP        Physical Exam    Airway    Mallampati score: II  TM Distance: >3 FB  Neck ROM: full     Dental   No notable dental hx     Cardiovascular  Rhythm: regular, Rate: normal,     Pulmonary  Breath sounds clear to auscultation,     Other Findings        Anesthesia Plan  ASA Score- 2     Anesthesia Type- IV sedation with anesthesia with ASA Monitors  Additional Monitors:   Airway Plan:           Plan Factors-Exercise tolerance (METS): >4 METS  Chart reviewed  Existing labs reviewed  Patient summary reviewed  Patient is not a current smoker  Induction-     Postoperative Plan-     Informed Consent- Anesthetic plan and risks discussed with patient  I personally reviewed this patient with the CRNA  Discussed and agreed on the Anesthesia Plan with the CRNA  Gilmar Valentine

## 2022-09-14 ENCOUNTER — TELEPHONE (OUTPATIENT)
Dept: FAMILY MEDICINE CLINIC | Facility: CLINIC | Age: 67
End: 2022-09-14

## 2022-09-14 DIAGNOSIS — U07.1 COVID-19 VIRUS INFECTION: Primary | ICD-10-CM

## 2022-09-14 RX ORDER — BENZONATATE 200 MG/1
200 CAPSULE ORAL 3 TIMES DAILY PRN
Qty: 20 CAPSULE | Refills: 0 | Status: SHIPPED | OUTPATIENT
Start: 2022-09-14 | End: 2023-01-10

## 2022-09-14 RX ORDER — FLUTICASONE PROPIONATE 110 UG/1
2 AEROSOL, METERED RESPIRATORY (INHALATION) 2 TIMES DAILY
Qty: 12 G | Refills: 0 | Status: SHIPPED | OUTPATIENT
Start: 2022-09-14 | End: 2023-01-10

## 2022-09-14 NOTE — TELEPHONE ENCOUNTER
Patient was tested for covid on 8/29/22  She is still coughing through the day  She started taking delym (OTC) and it does not seam to help  Ronit Ocasio     She would like to know if something else can be called into pharmacy or if she needs to come in for a follow up

## 2022-09-20 DIAGNOSIS — Z12.31 ENCOUNTER FOR SCREENING MAMMOGRAM FOR BREAST CANCER: ICD-10-CM

## 2022-10-19 ENCOUNTER — OFFICE VISIT (OUTPATIENT)
Dept: GYNECOLOGY | Facility: CLINIC | Age: 67
End: 2022-10-19
Payer: COMMERCIAL

## 2022-10-19 VITALS
SYSTOLIC BLOOD PRESSURE: 124 MMHG | HEIGHT: 66 IN | BODY MASS INDEX: 35.2 KG/M2 | DIASTOLIC BLOOD PRESSURE: 80 MMHG | WEIGHT: 219 LBS

## 2022-10-19 DIAGNOSIS — F10.20 ALCOHOLISM (HCC): ICD-10-CM

## 2022-10-19 DIAGNOSIS — Z01.419 ENCOUNTER FOR ANNUAL ROUTINE GYNECOLOGICAL EXAMINATION: ICD-10-CM

## 2022-10-19 DIAGNOSIS — Z12.31 SCREENING MAMMOGRAM FOR BREAST CANCER: Primary | ICD-10-CM

## 2022-10-19 DIAGNOSIS — N95.2 VAGINAL ATROPHY: ICD-10-CM

## 2022-10-19 PROCEDURE — 99397 PER PM REEVAL EST PAT 65+ YR: CPT | Performed by: OBSTETRICS & GYNECOLOGY

## 2022-10-19 NOTE — PROGRESS NOTES
Assessment/Plan:    Normal breast exam  Mild vaginal atrophy  Normal Pap smear negative HPV 2021  Normal mammogram 2022  Colonoscopy :  Inadequate to improper prep  DEXA scan showing osteopenia 2021  COVID vaccine in booster  COVID infection 2022    Plan:  Rx mammogram   Continue healthy diet weight-bearing exercise  Subjective:      Patient ID: Carina Kang is a 78 y o  female presents for annual exam with no complaints  Denies any pelvic pain vaginal bleeding breast bowel or bladder issues  Had a colonoscopy 2022 but could not be completed due to inadequate prep  Patient does not desire to go back and have redone  Father had a history of colon cancer and her mother had breast cancer  Medications reviewed  Review of Systems   Constitutional: Negative  Negative for fatigue, fever and unexpected weight change  HENT: Negative  Eyes: Negative  Respiratory: Negative  Negative for chest tightness, shortness of breath, wheezing and stridor  Cardiovascular: Negative  Negative for chest pain, palpitations and leg swelling  Gastrointestinal: Negative  Negative for abdominal pain, blood in stool, diarrhea, nausea, rectal pain and vomiting  Endocrine: Negative  Genitourinary: Negative for dysuria, frequency, vaginal bleeding, vaginal discharge and vaginal pain  Musculoskeletal: Negative  Skin: Negative  Allergic/Immunologic: Negative  Neurological: Negative  Hematological: Negative  Psychiatric/Behavioral: Negative  All other systems reviewed and are negative  Objective:      /80   Ht 5' 6" (1 676 m)   Wt 99 3 kg (219 lb)   BMI 35 35 kg/m²          Physical Exam  Constitutional:       Appearance: She is well-developed  HENT:      Head: Normocephalic and atraumatic  Neck:      Thyroid: No thyromegaly  Trachea: No tracheal deviation     Cardiovascular:      Rate and Rhythm: Normal rate and regular rhythm  Heart sounds: Normal heart sounds  Pulmonary:      Effort: Pulmonary effort is normal  No respiratory distress  Breath sounds: Normal breath sounds  No stridor  No wheezing or rales  Chest:      Chest wall: No tenderness  Breasts: Breasts are symmetrical       Right: No inverted nipple, mass, nipple discharge, skin change or tenderness  Left: No inverted nipple, mass, nipple discharge, skin change or tenderness  Abdominal:      General: Bowel sounds are normal  There is no distension  Palpations: Abdomen is soft  There is no mass  Tenderness: There is no abdominal tenderness  There is no guarding or rebound  Hernia: No hernia is present  There is no hernia in the left inguinal area  Genitourinary:     Labia:         Right: No rash, tenderness, lesion or injury  Left: No rash, tenderness, lesion or injury  Vagina: Normal  No signs of injury and foreign body  No vaginal discharge, erythema, tenderness or bleeding  Cervix: No cervical motion tenderness, discharge or friability  Uterus: Not deviated, not enlarged, not fixed and not tender  Adnexa:         Right: No mass, tenderness or fullness  Left: No mass, tenderness or fullness  Rectum: No mass, anal fissure, external hemorrhoid or internal hemorrhoid  Comments: Normal urethra and [place]'s glands  Mild vaginal atrophy  No uterine prolapse  No cystocele or rectocele  Musculoskeletal:      Cervical back: Normal range of motion and neck supple  Lymphadenopathy:      Lower Body: No right inguinal adenopathy  No left inguinal adenopathy  Skin:     General: Skin is warm and dry  Neurological:      Mental Status: She is alert and oriented to person, place, and time  Psychiatric:         Behavior: Behavior normal          Thought Content:  Thought content normal          Judgment: Judgment normal

## 2022-12-15 ENCOUNTER — TELEPHONE (OUTPATIENT)
Dept: FAMILY MEDICINE CLINIC | Facility: CLINIC | Age: 67
End: 2022-12-15

## 2022-12-15 NOTE — TELEPHONE ENCOUNTER
Patient  Is requesting a prednisone pack when she starts breaking out in hives   ,      She woke up today and having a outbreak on her lip and spreading  And  Would like something called into [facility] pharm

## 2022-12-16 DIAGNOSIS — L50.9 URTICARIA: Primary | ICD-10-CM

## 2022-12-16 RX ORDER — METHYLPREDNISOLONE 4 MG/1
TABLET ORAL
Qty: 21 EACH | Refills: 0 | Status: SHIPPED | OUTPATIENT
Start: 2022-12-16

## 2023-01-10 ENCOUNTER — OFFICE VISIT (OUTPATIENT)
Dept: PULMONOLOGY | Facility: CLINIC | Age: 68
End: 2023-01-10

## 2023-01-10 VITALS
TEMPERATURE: 99.5 F | OXYGEN SATURATION: 95 % | HEIGHT: 66 IN | SYSTOLIC BLOOD PRESSURE: 100 MMHG | DIASTOLIC BLOOD PRESSURE: 46 MMHG | WEIGHT: 220 LBS | RESPIRATION RATE: 18 BRPM | HEART RATE: 79 BPM | BODY MASS INDEX: 35.36 KG/M2

## 2023-01-10 DIAGNOSIS — G47.33 OSA ON CPAP: Primary | ICD-10-CM

## 2023-01-10 DIAGNOSIS — E66.9 CLASS 2 OBESITY WITHOUT SERIOUS COMORBIDITY WITH BODY MASS INDEX (BMI) OF 35.0 TO 35.9 IN ADULT, UNSPECIFIED OBESITY TYPE: ICD-10-CM

## 2023-01-10 DIAGNOSIS — Z99.89 OSA ON CPAP: Primary | ICD-10-CM

## 2023-01-10 DIAGNOSIS — G47.19 EXCESSIVE DAYTIME SLEEPINESS: ICD-10-CM

## 2023-01-10 NOTE — PATIENT INSTRUCTIONS
Follow up in 1 year  Please call the office regarding how to get compliance data  You can speak to Marce Clayton

## 2023-01-10 NOTE — PROGRESS NOTES
Progress Note - Sleep Medicine  Mayela Bull 78 y o  female MRN: [id number]       Impression & Plan:     Mayela Bull is a 69-year-old female with a past medical history of depression, hypothyroidism who presents today for follow-up of obstructive sleep apnea  1   Severe obstructive sleep apnea  Using CPAP for 15 years  Home sleep study on 9/7/2021 which showed an LISA of 33 6 with oxygen merlin of 84%  Current compliance data not on file  She is using Emelia machine on fixed pressure of 10  She reports all symptoms of sleep apnea are gone  She has an [place] score of 0  She reports no side effects of CPAP  Previous visit in January 2022 showed 100% compliance, average usage 9 hours 45 minutes, residual AHI 1 9    Plan  Follow-up in 1 year  Requested patient call the office so that we can walk her through how to set up the trevor on her phone so that we can get compliance data    2  Obesity  Counseled patient on lifestyle modifications including diet and exercise    3  Excessive daytime sleepiness  Vastly improved with current [place] score of 0      ______________________________________________________________________    HPI:    Mayela Bull is a 69-year-old female with a past medical history of depression, hypothyroidism who presents today for follow-up of obstructive sleep apnea  She was diagnosed with obstructive sleep apnea many years ago and has been on CPAP for over 15 years  She underwent home sleep study on 9/7/2021 which showed an LISA of 33 6 with oxygen merlin of 84%  She was started on CPAP with fixed pressure of 10  She has been compliant on previous visits with 100% compliance and average usage of 9 hours and 45 minutes  She had a residual AHI of 1 9 at last visit  She states she is doing very well  She has an [place] score of 0 currently  She states she religiously uses CPAP every night  She has not had any weight changes since last year    She reports no residual symptoms of sleep apnea   She is sleeping from 10 PM to 7 AM daily  It takes her 1 hour to fall asleep  She denies having any side effects of CPAP including headaches, belching, bloating etc         Review of Systems:  Review of Systems   Constitutional: Negative for appetite change and fever  HENT: Negative for ear pain, postnasal drip, rhinorrhea, sneezing, sore throat and trouble swallowing  Cardiovascular: Negative for chest pain  Musculoskeletal: Negative for myalgias  Neurological: Negative for headaches  All other systems reviewed and are negative          Social history updates:  Social History     Tobacco Use   Smoking Status Former   • Packs/day: 1 00   • Years: 30 00   • Pack years: 30 00   • Types: Cigarettes   • Start date: 1972   • Quit date: 2002   • Years since quittin 0   • Passive exposure: Past   Smokeless Tobacco Never     Social History     Socioeconomic History   • Marital status: /Civil Union     Spouse name: Not on file   • Number of children: Not on file   • Years of education: Not on file   • Highest education level: Not on file   Occupational History   • Not on file   Tobacco Use   • Smoking status: Former     Packs/day:  00     Years: 30 00     Pack years: 30 00     Types: Cigarettes     Start date: 1972     Quit date: 2002     Years since quittin 0     Passive exposure: Past   • Smokeless tobacco: Never   Vaping Use   • Vaping Use: Never used   Substance and Sexual Activity   • Alcohol use: Not Currently   • Drug use: No   • Sexual activity: Not Currently     Partners: Male     Birth control/protection: Male Sterilization   Other Topics Concern   • Not on file   Social History Narrative    2 cups of coffee daily     Social Determinants of Health     Financial Resource Strain: Not on file   Food Insecurity: Not on file   Transportation Needs: Not on file   Physical Activity: Not on file   Stress: Not on file   Social Connections: Not on file   Intimate Partner Violence: Not on file   Housing Stability: Not on file       PhysicalExamination:  Vitals:   BP (!) 100/46 (BP Location: Right arm, Patient Position: Sitting, Cuff Size: Standard)   Pulse 79   Temp 99 5 °F (37 5 °C) (Tympanic)   Resp 18   Ht 5' 6" (1 676 m)   Wt 99 8 kg (220 lb)   SpO2 95%   BMI 35 51 kg/m²     Physical Exam  General:  Awake alert and oriented x 3, conversant without conversational dyspnea, NAD, normal affect  HEENT:  PERRL, Sclera noninjected, nonicteric OU, Nares patent,  no craniofacial abnormalities, Mucous membranes, moist, no oral lesions, normal dentition  NECK:  Trachea midline, no accessory muscle use, no stridor, no cervical or supraclavicular adenopathy, JVP not elevated  CARDIAC: Reg, single s1/S2, no m/r/g  PULM: CTA bilaterally no wheezing, rhonchi or rales  EXT: No cyanosis, no clubbing, no edema, normal capillary refill  NEURO: no focal neurologic deficits, AAOx3, moving all extremities appropriately    Diagnostic Data:  Labs: I personally reviewed the most recent laboratory data pertinent to today's visit  No visits with results within 6 Month(s) from this visit     Latest known visit with results is:   Office Visit on 02/10/2022   Component Date Value   • Hemoglobin A1C 03/28/2022 5 8 (H)    • Estimated Average Glucose 03/28/2022 120    • TSH 03/28/2022 0 097 (L)    • White Blood Cell Count 03/28/2022 7 8    • Red Blood Cell Count 03/28/2022 4 48    • Hemoglobin 03/28/2022 13 2    • HCT 03/28/2022 39 9    • MCV 03/28/2022 89    • MCH 03/28/2022 29 5    • MCHC 03/28/2022 33 1    • RDW 03/28/2022 12 0    • Platelet Count 90/81/4708 260    • Neutrophils 03/28/2022 57    • Lymphocytes 03/28/2022 28    • Monocytes 03/28/2022 10    • Eosinophils 03/28/2022 3    • Basophils PCT 03/28/2022 1    • Neutrophils (Absolute) 03/28/2022 4 6    • Lymphocytes (Absolute) 03/28/2022 2 2    • Monocytes (Absolute) 03/28/2022 0 8    • Eosinophils (Absolute) 03/28/2022 0 2    • Basophils ABS 03/28/2022 0 1    • Immature Granulocytes 03/28/2022 1    • Immature Granulocytes (A* 03/28/2022 0 0    • Cholesterol, Total 03/28/2022 159    • Triglycerides 03/28/2022 165 (H)    • HDL 03/28/2022 51    • VLDL Cholesterol Calcula* 03/28/2022 28    • LDL Calculated 03/28/2022 80    • T  Chol/HDL Ratio 03/28/2022 3 1        I have personally reviewed pertinent lab results  Lab Results   Component Value Date    WBC 7 8 03/28/2022    HGB 13 2 03/28/2022    HCT 39 9 03/28/2022    MCV 89 03/28/2022     03/28/2022     Lab Results   Component Value Date    GLUCOSE 111 (H) 01/20/2017    CALCIUM 9 6 02/16/2017     01/20/2017    K 4 6 02/02/2022    CO2 22 02/02/2022     02/02/2022    BUN 11 02/02/2022    CREATININE 0 65 02/02/2022     No results found for: IGE  Lab Results   Component Value Date    ALT 22 02/02/2022    AST 20 02/02/2022    ALKPHOS 81 02/16/2017    BILITOT 0 5 01/20/2017     No results found for: IRON, TIBC, FERRITIN  No results found for: [id number]  No results found for: FOLATE      Arterial Blood Gas result:  NA    Sleep studies:  Diagnostic: Home sleep study on 9/7/2021 which showed an LISA of 33 6 with oxygen merlin of 84%    Titration:  Split:    Compliance Data: No compliance on file, previously had excellent compliance                                         Britni Nelson MD  [facility] Sleep Medicine Fellow  Answers for HPI/ROS submitted by the patient on 1/3/2023  Do you have shortness of breath that occurs with effort or exertion?: No  Do you have ear congestion?: No  Do you have heartburn?: No  Do you have fatigue?: No  Do you have nasal congestion?: No  Do you have shortness of breath when lying flat?: No  Do you have shortness of breath when you wake up?: No  Do you have sweats?: No  Have you experienced weight loss?: No  Which of the following makes your symptoms worse?: nothing  Which of the following makes your symptoms better?: nothing

## 2023-02-07 ENCOUNTER — RA CDI HCC (OUTPATIENT)
Dept: OTHER | Facility: HOSPITAL | Age: 68
End: 2023-02-07

## 2023-02-07 NOTE — PROGRESS NOTES
[facility] 75  coding opportunities       Chart reviewed, no opportunity found:   [address]        Patients Insurance     Medicare Insurance: [employer] Ad[employer]age

## 2023-02-13 ENCOUNTER — OFFICE VISIT (OUTPATIENT)
Dept: FAMILY MEDICINE CLINIC | Facility: CLINIC | Age: 68
End: 2023-02-13

## 2023-02-13 VITALS
WEIGHT: 224 LBS | OXYGEN SATURATION: 96 % | HEART RATE: 66 BPM | HEIGHT: 66 IN | TEMPERATURE: 97.6 F | BODY MASS INDEX: 36 KG/M2 | SYSTOLIC BLOOD PRESSURE: 122 MMHG | DIASTOLIC BLOOD PRESSURE: 74 MMHG

## 2023-02-13 DIAGNOSIS — E78.00 HYPERCHOLESTEREMIA: ICD-10-CM

## 2023-02-13 DIAGNOSIS — Z00.00 MEDICARE ANNUAL WELLNESS VISIT, SUBSEQUENT: ICD-10-CM

## 2023-02-13 DIAGNOSIS — E03.9 ACQUIRED HYPOTHYROIDISM: ICD-10-CM

## 2023-02-13 DIAGNOSIS — E78.2 MIXED HYPERLIPIDEMIA: Primary | ICD-10-CM

## 2023-02-13 DIAGNOSIS — R73.03 PREDIABETES: ICD-10-CM

## 2023-02-13 DIAGNOSIS — K57.90 DIVERTICULAR DISEASE: ICD-10-CM

## 2023-02-13 DIAGNOSIS — K76.0 FATTY LIVER: ICD-10-CM

## 2023-02-13 DIAGNOSIS — Z99.89 OSA ON CPAP: ICD-10-CM

## 2023-02-13 DIAGNOSIS — Z12.11 SCREENING FOR COLON CANCER: ICD-10-CM

## 2023-02-13 DIAGNOSIS — G47.33 OSA ON CPAP: ICD-10-CM

## 2023-02-13 DIAGNOSIS — E66.01 OBESITY, MORBID (HCC): ICD-10-CM

## 2023-02-13 DIAGNOSIS — F33.42 RECURRENT MAJOR DEPRESSIVE DISORDER, IN FULL REMISSION (HCC): ICD-10-CM

## 2023-02-13 DIAGNOSIS — F10.20 ALCOHOLISM (HCC): ICD-10-CM

## 2023-02-13 DIAGNOSIS — F33.0 MILD EPISODE OF RECURRENT MAJOR DEPRESSIVE DISORDER (HCC): ICD-10-CM

## 2023-02-13 PROBLEM — R92.8 ABNORMAL MAMMOGRAM: Status: RESOLVED | Noted: 2017-01-17 | Resolved: 2023-02-13

## 2023-02-13 PROBLEM — E66.812 CLASS 2 OBESITY WITHOUT SERIOUS COMORBIDITY WITH BODY MASS INDEX (BMI) OF 35.0 TO 35.9 IN ADULT: Status: RESOLVED | Noted: 2021-06-17 | Resolved: 2023-02-13

## 2023-02-13 PROBLEM — R10.30 LOWER ABDOMINAL PAIN: Status: RESOLVED | Noted: 2022-01-31 | Resolved: 2023-02-13

## 2023-02-13 PROBLEM — G47.19 EXCESSIVE DAYTIME SLEEPINESS: Status: RESOLVED | Noted: 2021-06-17 | Resolved: 2023-02-13

## 2023-02-13 PROBLEM — R19.4 CHANGE IN BOWEL HABITS: Status: RESOLVED | Noted: 2022-01-31 | Resolved: 2023-02-13

## 2023-02-13 PROBLEM — E66.9 CLASS 2 OBESITY WITHOUT SERIOUS COMORBIDITY WITH BODY MASS INDEX (BMI) OF 35.0 TO 35.9 IN ADULT: Status: RESOLVED | Noted: 2021-06-17 | Resolved: 2023-02-13

## 2023-02-13 PROBLEM — F32.5 MAJOR DEPRESSIVE DISORDER IN FULL REMISSION (HCC): Status: ACTIVE | Noted: 2023-02-13

## 2023-02-13 RX ORDER — LEVOTHYROXINE SODIUM 175 UG/1
175 TABLET ORAL
Qty: 90 TABLET | Refills: 3 | Status: SHIPPED | OUTPATIENT
Start: 2023-02-13

## 2023-02-13 RX ORDER — FLUOXETINE HYDROCHLORIDE 20 MG/1
20 CAPSULE ORAL DAILY
Qty: 90 CAPSULE | Refills: 3 | Status: SHIPPED | OUTPATIENT
Start: 2023-02-13

## 2023-02-13 RX ORDER — PRAVASTATIN SODIUM 20 MG
20 TABLET ORAL DAILY
Qty: 90 TABLET | Refills: 3 | Status: SHIPPED | OUTPATIENT
Start: 2023-02-13

## 2023-02-13 RX ORDER — BUPROPION HYDROCHLORIDE 300 MG/1
300 TABLET ORAL DAILY
Qty: 90 TABLET | Refills: 3 | Status: SHIPPED | OUTPATIENT
Start: 2023-02-13

## 2023-02-13 NOTE — PATIENT INSTRUCTIONS
Medicare Preventive Visit Patient Instructions  Thank you for completing your Welcome to Medicare Visit or Medicare Annual Wellness Visit today  Your next wellness visit will be due in one year (2/14/2024)  The screening/preventive services that you may require over the next 5-10 years are detailed below  Some tests may not apply to you based off risk factors and/or age  Screening tests ordered at today's visit but not completed yet may show as past due  Also, please note that scanned in results may not display below  Preventive Screenings:  Service Recommendations Previous Testing/Comments   Colorectal Cancer Screening  * Colonoscopy    * Fecal Occult Blood Test (FOBT)/Fecal Immunochemical Test (FIT)  * Fecal DNA/Cologuard Test  * Flexible Sigmoidoscopy Age: 39-70 years old   Colonoscopy: every 10 years (may be performed more frequently if at higher risk)  OR  FOBT/FIT: every 1 year  OR  Cologuard: every 3 years  OR  Sigmoidoscopy: every 5 years  Screening may be recommended earlier than age 39 if at higher risk for colorectal cancer  Also, an individualized decision between you and your healthcare provider will decide whether screening between the ages of 74-80 would be appropriate  Colonoscopy: 04/04/2022  FOBT/FIT: Not on file  Cologuard: Not on file  Sigmoidoscopy: Not on file    Screening Current     Breast Cancer Screening Age: 36 years old  Frequency: every 1-2 years  Not required if history of left and right mastectomy Mammogram: 09/19/2022    Screening Current   Cervical Cancer Screening Between the ages of 21-29, pap smear recommended once every 3 years  Between the ages of 33-67, can perform pap smear with HPV co-testing every 5 years     Recommendations may differ for women with a history of total hysterectomy, cervical cancer, or abnormal pap smears in past  Pap Smear: 10/14/2021    Screening Not Indicated   Hepatitis C Screening Once for adults born between 1945 and 1965  More frequently in patients at high risk for Hepatitis C Hep C Antibody: Not on file        Diabetes Screening 1-2 times per year if you're at risk for diabetes or have pre-diabetes Fasting glucose: No results in last 5 years (No results in last 5 years)  A1C: 5 8 % (3/28/2022)  Screening Current   Cholesterol Screening Once every 5 years if you don't have a lipid disorder  May order more often based on risk factors  Lipid panel: 03/28/2022    Screening Not Indicated  History Lipid Disorder     Other Preventive Screenings Covered by Medicare:  1  Abdominal Aortic Aneurysm (AAA) Screening: covered once if your at risk  You're considered to be at risk if you have a family history of AAA  2  Lung Cancer Screening: covers low dose CT scan once per year if you meet all of the following conditions: (1) Age 50-69; (2) No signs or symptoms of lung cancer; (3) Current smoker or have quit smoking within the last 15 years; (4) You have a tobacco smoking history of at least 20 pack years (packs per day multiplied by number of years you smoked); (5) You get a written order from a healthcare provider  3  Glaucoma Screening: covered annually if you're considered high risk: (1) You have diabetes OR (2) Family history of glaucoma OR (3)  aged 48 and older OR (3)  American aged 72 and older  3  Osteoporosis Screening: covered every 2 years if you meet one of the following conditions: (1) You're estrogen deficient and at risk for osteoporosis based off medical history and other findings; (2) Have a vertebral abnormality; (3) On glucocorticoid therapy for more than 3 months; (4) Have primary hyperparathyroidism; (5) On osteoporosis medications and need to assess response to drug therapy  · Last bone density test (DXA Scan): 03/01/2021   5  HIV Screening: covered annually if you're between the age of 15-65  Also covered annually if you are younger than 13 and older than 72 with risk factors for HIV infection   For pregnant patients, it is covered up to 3 times per pregnancy  Immunizations:  Immunization Recommendations   Influenza Vaccine Annual influenza vaccination during flu season is recommended for all persons aged >= 6 months who do not have contraindications   Pneumococcal Vaccine   * Pneumococcal conjugate vaccine = PCV13 (Prevnar 13), PCV15 (Vaxneuvance), PCV20 (Prevnar 20)  * Pneumococcal polysaccharide vaccine = PPSV23 (Pneumovax) Adults 25-60 years old: 1-3 doses may be recommended based on certain risk factors  Adults 72 years old: 1-2 doses may be recommended based off what pneumonia vaccine you previously received   Hepatitis B Vaccine 3 dose series if at intermediate or high risk (ex: diabetes, end stage renal disease, liver disease)   Tetanus (Td) Vaccine - COST NOT COVERED BY MEDICARE PART B Following completion of primary series, a booster dose should be given every 10 years to maintain immunity against tetanus  Td may also be given as tetanus wound prophylaxis  Tdap Vaccine - COST NOT COVERED BY MEDICARE PART B Recommended at least once for all adults  For pregnant patients, recommended with each pregnancy  Shingles Vaccine (Shingrix) - COST NOT COVERED BY MEDICARE PART B  2 shot series recommended in those aged 48 and above     Health Maintenance Due:      Topic Date Due   • Hepatitis C Screening  Never done   • Colorectal Cancer Screening  10/01/2022   • Breast Cancer Screening: Mammogram  09/19/2023     Immunizations Due:      Topic Date Due   • Pneumococcal Vaccine: 65+ Years (2 - PCV) 02/10/2023     Advance Directives   What are advance directives? Advance directives are legal documents that state your wishes and plans for medical care  These plans are made ahead of time in case you lose your ability to make decisions for yourself  Advance directives can apply to any medical decision, such as the treatments you want, and if you want to donate organs  What are the types of advance directives?   There are many types of advance directives, and each state has rules about how to use them  You may choose a combination of any of the following:  · Living will: This is a written record of the treatment you want  You can also choose which treatments you do not want, which to limit, and which to stop at a certain time  This includes surgery, medicine, IV fluid, and tube feedings  · Durable power of  for healthcare [facility]): This is a written record that states who you want to make healthcare choices for you when you are unable to make them for yourself  This person, called a proxy, is usually a family member or a friend  You may choose more than 1 proxy  · Do not resuscitate (DNR) order:  A DNR order is used in case your heart stops beating or you stop breathing  It is a request not to have certain forms of treatment, such as CPR  A DNR order may be included in other types of advance directives  · Medical directive: This covers the care that you want if you are in a coma, near death, or unable to make decisions for yourself  You can list the treatments you want for each condition  Treatment may include pain medicine, surgery, blood transfusions, dialysis, IV or tube feedings, and a ventilator (breathing machine)  · Values history: This document has questions about your views, beliefs, and how you feel and think about life  This information can help others choose the care that you would choose  Why are advance directives important? An advance directive helps you control your care  Although spoken wishes may be used, it is better to have your wishes written down  Spoken wishes can be misunderstood, or not followed  Treatments may be given even if you do not want them  An advance directive may make it easier for your family to make difficult choices about your care  Urinary Incontinence   Urinary incontinence (UI)  is when you lose control of your bladder   UI develops because your bladder cannot store or empty urine properly  The 3 most common types of UI are stress incontinence, urge incontinence, or both  Medicines:   · May be given to help strengthen your bladder control  Report any side effects of medication to your healthcare provider  Do pelvic muscle exercises often:  Your pelvic muscles help you stop urinating  Squeeze these muscles tight for 5 seconds, then relax for 5 seconds  Gradually work up to squeezing for 10 seconds  Do 3 sets of 15 repetitions a day, or as directed  This will help strengthen your pelvic muscles and improve bladder control  Train your bladder:  Go to the bathroom at set times, such as every 2 hours, even if you do not feel the urge to go  You can also try to hold your urine when you feel the urge to go  For example, hold your urine for 5 minutes when you feel the urge to go  As that becomes easier, hold your urine for 10 minutes  Self-care:   · Keep a UI record  Write down how often you leak urine and how much you leak  Make a note of what you were doing when you leaked urine  · Drink liquids as directed  You may need to limit the amount of liquid you drink to help control your urine leakage  Do not drink any liquid right before you go to bed  Limit or do not have drinks that contain caffeine or alcohol  · Prevent constipation  Eat a variety of high-fiber foods  Good examples are high-fiber cereals, beans, vegetables, and whole-grain breads  Walking is the best way to trigger your intestines to have a bowel movement  · Exercise regularly and maintain a healthy weight  Weight loss and exercise will decrease pressure on your bladder and help you control your leakage  · Use a catheter as directed  to help empty your bladder  A catheter is a tiny, plastic tube that is put into your bladder to drain your urine  · Go to behavior therapy as directed  Behavior therapy may be used to help you learn to control your urge to urinate      Weight Management   Why it is important to manage your weight:  Being overweight increases your risk of health conditions such as heart disease, high blood pressure, type 2 diabetes, and certain types of cancer  It can also increase your risk for osteoarthritis, sleep apnea, and other respiratory problems  Aim for a slow, steady weight loss  Even a small amount of weight loss can lower your risk of health problems  How to lose weight safely:  A safe and healthy way to lose weight is to eat fewer calories and get regular exercise  You can lose up about 1 pound a week by decreasing the number of calories you eat by 500 calories each day  Healthy meal plan for weight management:  A healthy meal plan includes a variety of foods, contains fewer calories, and helps you stay healthy  A healthy meal plan includes the following:  · Eat whole-grain foods more often  A healthy meal plan should contain fiber  Fiber is the part of grains, fruits, and vegetables that is not broken down by your body  Whole-grain foods are healthy and provide extra fiber in your diet  Some examples of whole-grain foods are whole-wheat breads and pastas, oatmeal, brown rice, and bulgur  · Eat a variety of vegetables every day  Include dark, leafy greens such as spinach, kale, umesh greens, and mustard greens  Eat yellow and orange vegetables such as carrots, sweet potatoes, and winter squash  · Eat a variety of fruits every day  Choose fresh or canned fruit (canned in its own juice or light syrup) instead of juice  Fruit juice has very little or no fiber  · Eat low-fat dairy foods  Drink fat-free (skim) milk or 1% milk  Eat fat-free yogurt and low-fat cottage cheese  Try low-fat cheeses such as mozzarella and other reduced-fat cheeses  · Choose meat and other protein foods that are low in fat  Choose beans or other legumes such as split peas or lentils  Choose fish, skinless poultry (chicken or turkey), or lean cuts of red meat (beef or pork)   Before you cook meat or poultry, cut off any visible fat  · Use less fat and oil  Try baking foods instead of frying them  Add less fat, such as margarine, sour cream, regular salad dressing and mayonnaise to foods  Eat fewer high-fat foods  Some examples of high-fat foods include french fries, doughnuts, ice cream, and cakes  · Eat fewer sweets  Limit foods and drinks that are high in sugar  This includes candy, cookies, regular soda, and sweetened drinks  Exercise:  Exercise at least 30 minutes per day on most days of the week  Some examples of exercise include walking, biking, dancing, and swimming  You can also fit in more physical activity by taking the stairs instead of the elevator or parking farther away from stores  Ask your healthcare provider about the best exercise plan for you  © Copyright [employer] 2018 Information is for End User's use only and may not be sold, redistributed or otherwise used for commercial purposes   All illustrations and images included in CareNotes® are the copyrighted property of [employer]  or [address] [place] [address]

## 2023-02-13 NOTE — PROGRESS NOTES
Assessment and Plan:     Problem List Items Addressed This Visit        Digestive    Fatty liver    Relevant Orders    Ambulatory Referral to Weight Management       Endocrine    Hypothyroidism    Relevant Medications    levothyroxine 175 mcg tablet    Other Relevant Orders    CBC and differential    TSH, 3rd generation with Free T4 reflex       Respiratory    DIONE on CPAP       Other    Hyperlipidemia - Primary    Relevant Medications    pravastatin (PRAVACHOL) 20 mg tablet    Other Relevant Orders    Comprehensive metabolic panel    Lipid panel    Ambulatory Referral to Weight Management    Prediabetes    Relevant Orders    Hemoglobin A1C    Ambulatory Referral to Weight Management    Obesity, morbid ([facility] )    Relevant Orders    Ambulatory Referral to Weight Management    Alcoholism ([facility] )    Major depressive disorder in full remission (HCC)    Relevant Medications    FLUoxetine (PROzac) 20 mg capsule    buPROPion (WELLBUTRIN XL) 300 mg 24 hr tablet   Other Visit Diagnoses     BMI 36 0-36 9,adult        Relevant Orders    Ambulatory Referral to Weight Management    Medicare annual wellness visit, subsequent        Diverticular disease        Relevant Orders    Ambulatory Referral to Colorectal Surgery    Screening for colon cancer        Relevant Orders    Ambulatory Referral to Colorectal Surgery    Hypercholesteremia        Relevant Medications    pravastatin (PRAVACHOL) 20 mg tablet    Mild episode of recurrent major depressive disorder (HCC)        Relevant Medications    FLUoxetine (PROzac) 20 mg capsule    buPROPion (WELLBUTRIN XL) 300 mg 24 hr tablet          Continue with current medications  Repeat labs  Colorectal surgery referral at patient's request  Referral to Weight management    Office visit 1 year    PHQ-9 Depression Screening    Little interest or pleasure in doing things: 0 - not at all  Feeling down, depressed, or hopeless: 0 - not at all  Trouble falling or staying asleep, or sleeping too much: 0 - not at all  Feeling tired or having little energy: 0 - not at all  Poor appetite or overeatin - not at all  Feeling bad about yourself - or that you are a failure or have let yourself or your family down: 0 - not at all  Trouble concentrating on things, such as reading the newspaper or watching television: 0 - not at all  Moving or speaking so slowly that other people could have noticed  Or the opposite - being so fidgety or restless that you have been moving around a lot more than usual: 0 - not at all  Thoughts that you would be better off dead, or of hurting yourself in some way: 0 - not at all  PHQ-9 Score: 0  Score Interpretation: No or Minimal depression         BMI Counseling: Body mass index is 36 15 kg/m²  The BMI is above normal  Nutrition recommendations include decreasing portion sizes, consuming healthier snacks, moderation in carbohydrate intake, reducing intake of saturated and trans fat and reducing intake of cholesterol  Exercise recommendations include exercising 3-5 times per week  No pharmacotherapy was ordered  Rationale for BMI follow-up plan is due to patient being overweight or obese  Urinary Incontinence Plan of Care: counseling topics discussed: limiting fluid intake 3-4 hours before bed  Preventive health issues were discussed with patient, and age appropriate screening tests were ordered as noted in patient's After Visit Summary  Personalized health advice and appropriate referrals for health education or preventive services given if needed, as noted in patient's After Visit Summary  History of Present Illness:     Patient presents for a Medicare Wellness Visit    73year old female here for Wellness exam   Medications reviewed Hospitalizations/surgeries left distal radius fracture ORIF  Nasal fracture surgery  Mohs surgery  Family history breast cancer mother  Colon cancer parents  Social history former smoker quit    Hyperlipidemia mixed type on pravastatin 20 mg daily  Last lipid profile 09/2021 cholesterol 190  Triglycerides 201  HDL 60  LDL 96  02/2022 LFTs normal  Impaired fasting glucose 02/2022   02/2021 A1c 5 5    Mammogram 09/2021      Recent Results (from the past 56333 hour(s))   Celiac Disease Antibody Profile    Collection Time: 02/02/22  7:24 AM   Result Value Ref Range    Gliadin IgA 4 0 - 19 units    Gliadin IgG 1 0 - 19 units    TISSUE TRANSGLUTAMINASE IGA <2 0 - 3 U/mL    Tissue Transglut Ab IGG <2 0 - 5 U/mL    Endomysial IgA Negative Negative    IgA 242 87 - 352 mg/dL   Comprehensive metabolic panel    Collection Time: 02/02/22  7:24 AM   Result Value Ref Range    Glucose, Random 105 (H) 65 - 99 mg/dL    BUN 11 8 - 27 mg/dL    Creatinine 0 65 0 57 - 1 00 mg/dL    eGFR Non  93 >59 mL/min/1 73    eGFR  108 >59 mL/min/1 73    SL AMB BUN/CREATININE RATIO 17 12 - 28    Sodium 141 134 - 144 mmol/L    Potassium 4 6 3 5 - 5 2 mmol/L    Chloride 104 96 - 106 mmol/L    CO2 22 20 - 29 mmol/L    CALCIUM 9 7 8 7 - 10 3 mg/dL    Protein, Total 6 4 6 0 - 8 5 g/dL    Albumin 4 2 3 8 - 4 8 g/dL    Globulin, Total 2 2 1 5 - 4 5 g/dL    Albumin/Globulin Ratio 1 9 1 2 - 2 2    TOTAL BILIRUBIN 0 3 0 0 - 1 2 mg/dL    Alk Phos Isoenzymes 89 44 - 121 IU/L    AST 20 0 - 40 IU/L    ALT 22 0 - 32 IU/L   TSH, 3rd generation with Free T4 reflex    Collection Time: 02/02/22  7:24 AM   Result Value Ref Range    TSH 0 116 (L) 0 450 - 4 500 uIU/mL   Magnesium    Collection Time: 02/02/22  7:24 AM   Result Value Ref Range    Magnesium, Serum 2 3 1 6 - 2 3 mg/dL   Hemoglobin A1C    Collection Time: 03/28/22  7:46 AM   Result Value Ref Range    Hemoglobin A1C 5 8 (H) 4 8 - 5 6 %    Estimated Average Glucose 120 mg/dL   TSH, 3rd generation with Free T4 reflex    Collection Time: 03/28/22  7:46 AM   Result Value Ref Range    TSH 0 097 (L) 0 450 - 4 500 uIU/mL   CBC and differential    Collection Time: 03/28/22  7:46 AM   Result Value Ref Range    White Blood Cell Count 7 8 3 4 - 10 8 x10E3/uL    Red Blood Cell Count 4 48 3 77 - 5 28 x10E6/uL    Hemoglobin 13 2 11 1 - 15 9 g/dL    HCT 39 9 34 0 - 46 6 %    MCV 89 79 - 97 fL    MCH 29 5 26 6 - 33 0 pg    MCHC 33 1 31 5 - 35 7 g/dL    RDW 12 0 11 7 - 15 4 %    Platelet Count 716 323 - 450 x10E3/uL    Neutrophils 57 Not Estab  %    Lymphocytes 28 Not Estab  %    Monocytes 10 Not Estab  %    Eosinophils 3 Not Estab  %    Basophils PCT 1 Not Estab  %    Neutrophils (Absolute) 4 6 1 4 - 7 0 x10E3/uL    Lymphocytes (Absolute) 2 2 0 7 - 3 1 x10E3/uL    Monocytes (Absolute) 0 8 0 1 - 0 9 x10E3/uL    Eosinophils (Absolute) 0 2 0 0 - 0 4 x10E3/uL    Basophils ABS 0 1 0 0 - 0 2 x10E3/uL    Immature Granulocytes 1 Not Estab  %    Immature Granulocytes (Absolute) 0 0 0 0 - 0 1 x10E3/uL   Lipid panel    Collection Time: 03/28/22  7:46 AM   Result Value Ref Range    Cholesterol, Total 159 100 - 199 mg/dL    Triglycerides 165 (H) 0 - 149 mg/dL    HDL 51 >39 mg/dL    VLDL Cholesterol Calculated 28 5 - 40 mg/dL    LDL Calculated 80 0 - 99 mg/dL    T  Chol/HDL Ratio 3 1 0 0 - 4 4 ratio           Patient Care Team:  Anna Marie Lizarraga MD as PCP - MD Chris Gustafson MD as Endoscopist     Review of Systems:     Review of Systems   Constitutional: Positive for unexpected weight change (5 lb weight gain from 10/2022 )  Negative for appetite change, chills, fatigue and fever  Struggling to lose weight    HENT: Negative for congestion, ear pain, rhinorrhea, sore throat and trouble swallowing  Eyes: Negative for visual disturbance  Respiratory: Positive for apnea  Negative for cough, shortness of breath and wheezing  DIONE on CPAP   Cardiovascular: Negative for chest pain, palpitations and leg swelling  Gastrointestinal: Negative for abdominal pain, blood in stool, constipation, diarrhea, nausea and vomiting         03/2022 CT scan Extensive colonic diverticulosis   Hepatic steatosis  Colonoscopy 04/2022 severe diverticulosis incomplete study due to the presence of stool  + FH colon cancer    Endocrine:        Hypothyroidism on Levothyroxine 200 mcg daily  02/2022 TSH low at 0 116  DEXA scan 03/2021 DEXA scan osteopenia left distal forearm  Lumbar spine and femur demonstrate normal BMD   Slight decrease in overall BMD from prior study 02/2019  On MVI daily    Genitourinary: Negative for difficulty urinating  GYN and pap smear 10/2021   Musculoskeletal: Negative for arthralgias and myalgias  S/p fracture left distal radius 02/2016 secondary to fall,   Skin: Negative for rash  s/p resection SCC right face followed by Dermatology  Chronic hives with prior episodes of swelling of lips and tongue  Prior allergist evaluation  Food allergy panel 07/2017 + shrimp  no reaction to shrimp  Repeat allergy testing 2018 + trees, grass, dust and eggs  No improvement with Allegra  She has an Epi Pen  Allergic/Immunologic: Positive for food allergies  Neurological: Negative for dizziness and headaches  Hematological: Negative for adenopathy  Does not bruise/bleed easily  Psychiatric/Behavioral: Positive for dysphoric mood  Negative for sleep disturbance and suicidal ideas  The patient is not nervous/anxious  Longstanding history of depression stable on Wellbutrin  mg/day and Prozac 20 mg daily  History of alcoholism with prior in patient rehab  She completed a partial out program 3 days a week at [facility]               Problem List:     Patient Active Problem List   Diagnosis   • Hyperlipidemia   • Hypothyroidism   • Prediabetes   • DIONE on CPAP   • Food allergy   • Chronic urticaria   • Osteopenia of right forearm   • Fatty liver   • Sleep disturbance   • Obesity, morbid (HCC)   • Constipation   • Alcoholism ([facility] )   • Major depressive disorder in full remission [facility])      Past Medical and Surgical History:     Past Medical History:   Diagnosis Date   • Anxiety    • Closed fracture of distal radius and ulna, left, initial encounter     Last Assessed:  16   • CPAP (continuous positive airway pressure) dependence    • Depression    • Disease of thyroid gland    • Fibromyalgia    • GERD (gastroesophageal reflux disease)    • Hyperlipidemia    • Sleep apnea     pt uses c-pap   • Sleep apnea, obstructive    • Squamous cell carcinoma of skin     Last Assessed:  17     Past Surgical History:   Procedure Laterality Date   • COLON SURGERY     • COLONOSCOPY N/A 2016    Procedure: COLONOSCOPY WITH ENDOCLIPS AND INJECTION FOR HEMOSTASIS;  Surgeon: Payam Collins MD;  Location: [facility];  Service:    • COLONOSCOPY  2019   • MOHS RECONSTRUCTION     • NASAL FRACTURE SURGERY     • OK OPTX DSTL RADL I-ARTIC FX/EPIPHYSL SEP 3 FRAG Left 2016    Procedure: OPEN REDUCTION W/ INTERNAL FIXATION (ORIF)DISTAL  RADIUS ;  Surgeon: Julian Prakash MD;  Location: [facility];  Service: Orthopedics      Family History:     Family History   Problem Relation Age of Onset   • Cancer Mother         Liver cancer   • Breast cancer Mother    • Cancer Father         Colon   • Colon cancer Father    • Cancer Sister       Social History:     Social History     Socioeconomic History   • Marital status: /Civil Union     Spouse name: Not on file   • Number of children: Not on file   • Years of education: Not on file   • Highest education level: Not on file   Occupational History   • Not on file   Tobacco Use   • Smoking status: Former     Packs/day: 1 00     Years: 30 00     Pack years: 30 00     Types: Cigarettes     Start date: 1972     Quit date: 2002     Years since quittin 1     Passive exposure: Past   • Smokeless tobacco: Never   Vaping Use   • Vaping Use: Never used   Substance and Sexual Activity   • Alcohol use: Not Currently   • Drug use: No   • Sexual activity: Not Currently     Partners: Male     Birth control/protection: Male Sterilization Other Topics Concern   • Not on file   Social History Narrative    2 cups of coffee daily     Social Determinants of Health     Financial Resource Strain: Low Risk    • Difficulty of Paying Living Expenses: Not hard at all   Food Insecurity: Not on file   Transportation Needs: No Transportation Needs   • Lack of Transportation (Medical): No   • Lack of Transportation (Non-Medical): No   Physical Activity: Not on file   Stress: Not on file   Social Connections: Not on file   Intimate Partner Violence: Not on file   Housing Stability: Not on file      Medications and Allergies:     Current Outpatient Medications   Medication Sig Dispense Refill   • buPROPion (WELLBUTRIN XL) 300 mg 24 hr tablet Take 1 tablet (300 mg total) by mouth daily 90 tablet 3   • FLUoxetine (PROzac) 20 mg capsule Take 1 capsule (20 mg total) by mouth daily 90 capsule 3   • levothyroxine 175 mcg tablet Take 1 tablet (175 mcg total) by mouth daily in the early morning 90 tablet 3   • pravastatin (PRAVACHOL) 20 mg tablet Take 1 tablet (20 mg total) by mouth daily 90 tablet 3   • Calcium Carbonate-Vit D-Min (CALCIUM 1200 PO) Take by mouth daily     • cholecalciferol (VITAMIN D3) 25 mcg (1,000 units) tablet      • Magnesium 200 MG TABS Take 1 tablet by mouth daily     • methylPREDNISolone 4 MG tablet therapy pack Use as directed on package 21 each 0   • Multiple Vitamins-Minerals (MULTIVITAMIN ADULT PO) Take 1 tablet by mouth daily     • Omega-3 Fatty Acids (FISH OIL) 1000 MG CPDR Take 1 capsule by mouth daily     • vitamin E 100 UNIT capsule Take 1 capsule by mouth daily       Current Facility-Administered Medications   Medication Dose Route Frequency Provider Last Rate Last Admin   • EPINEPHrine PF (ADRENALIN) 1 mg/mL injection 0 5 mg  0 5 mg Intramuscular Once Veronique Memo Rivas MD         Allergies   Allergen Reactions   • Erythromycin Shortness Of Breath     Reaction Date: [id number];    • Cefprozil      Reaction Date: [id number];    • Cephalosporins       Immunizations:     Immunization History   Administered Date(s) Administered   • COVID-19 PFIZER VACCINE 0 3 ML IM 03/21/2021, 04/11/2021, 11/23/2021, 04/28/2022, 12/16/2022   • INFLUENZA 10/06/2015, 10/21/2016, 10/02/2017, 10/15/2022   • Influenza Quadrivalent, 6-35 Months IM 10/06/2015, 10/21/2016, 10/02/2017   • Influenza, high dose seasonal 0 7 mL 10/29/2021   • Influenza, injectable, quadrivalent, preservative free 0 5 mL 10/23/2018   • Influenza, recombinant, quadrivalent,injectable, preservative free 09/16/2019, 10/05/2020   • Influenza, seasonal, injectable 09/21/2012, 10/08/2013, 10/03/2014   • Pneumococcal Polysaccharide PPV23 02/10/2022   • Tdap 03/15/2013   • Zoster Vaccine Recombinant 09/06/2019, 11/12/2019      Health Maintenance:         Topic Date Due   • Colorectal Cancer Screening  10/01/2022   • Hepatitis C Screening  02/13/2024 (Originally 1955)   • Breast Cancer Screening: Mammogram  09/19/2023     There are no preventive care reminders to display for this patient  Medicare Screening Tests and Risk Assessments:     Rosario Timmons is here for her Subsequent Wellness visit  Last Medicare Wellness visit information reviewed, patient interviewed and updates made to the record today  Health Risk Assessment:   Patient rates overall health as very good  Patient feels that their physical health rating is same  Patient is very satisfied with their life  Eyesight was rated as same  Hearing was rated as same  Patient feels that their emotional and mental health rating is same  Patients states they are never, rarely angry  Patient states they are never, rarely unusually tired/fatigued  Pain experienced in the last 7 days has been none  Patient states that she has experienced weight loss or gain in last 6 months  Depression Screening:   PHQ-9 Score: 0      Fall Risk Screening:    In the past year, patient has experienced: no history of falling in past year      Urinary Incontinence Screening:   Patient has leaked urine accidently in the last six months  Urge UI mild managed without medication     Home Safety:  Patient does not have trouble with stairs inside or outside of their home  Patient has working smoke alarms and has no working carbon monoxide detector  Home safety hazards include: none  Nutrition:   Current diet is Regular  Medications:   Patient is currently taking over-the-counter supplements  OTC medications include: see medication list  Patient is able to manage medications  Activities of Daily Living (ADLs)/Instrumental Activities of Daily Living (IADLs):   Walk and transfer into and out of bed and chair?: Yes  Dress and groom yourself?: Yes    Bathe or shower yourself?: Yes    Feed yourself? Yes  Do your laundry/housekeeping?: Yes  Manage your money, pay your bills and track your expenses?: Yes  Make your own meals?: Yes    Do your own shopping?: Yes    Previous Hospitalizations:   Any hospitalizations or ED visits within the last 12 months?: No      Advance Care Planning:   Living will: Yes    Durable POA for healthcare: Yes    Advanced directive: Yes      PREVENTIVE SCREENINGS      Cardiovascular Screening:    General: Screening Not Indicated and History Lipid Disorder      Diabetes Screening:     General: Screening Current      Colorectal Cancer Screening:     General: Screening Current      Breast Cancer Screening:     General: Screening Current      Cervical Cancer Screening:    General: Screening Not Indicated      Lung Cancer Screening:     General: Screening Not Indicated      Hepatitis C Screening:    General: Screening Current    Screening, Brief Intervention, and Referral to Treatment (SBIRT)    Screening  Typical number of drinks in a day: 0  Typical number of drinks in a week: 0  Interpretation: Low risk drinking behavior      Single Item Drug Screening:  How often have you used an illegal drug (including marijuana) or a prescription medication for non-medical reasons in the past year? never    Single Item Drug Screen Score: 0  Interpretation: Negative screen for possible drug use disorder    No results found  Physical Exam:     /74 (BP Location: Left arm, Patient Position: Sitting, Cuff Size: Standard)   Pulse 66   Temp 97 6 °F (36 4 °C)   Ht 5' 6" (1 676 m)   Wt 102 kg (224 lb)   SpO2 96%   BMI 36 15 kg/m²     BP Readings from Last 3 Encounters:   02/13/23 122/74   01/10/23 (!) 100/46   10/19/22 124/80     Wt Readings from Last 3 Encounters:   02/13/23 102 kg (224 lb)   01/10/23 99 8 kg (220 lb)   10/19/22 99 3 kg (219 lb)         Physical Exam  Vitals and nursing note reviewed  Constitutional:       General: She is not in acute distress  Appearance: She is well-developed  HENT:      Right Ear: Tympanic membrane and ear canal normal       Left Ear: Tympanic membrane and ear canal normal       Mouth/Throat:      Mouth: No oral lesions  Dentition: Normal dentition  Eyes:      General: No scleral icterus  Extraocular Movements: Extraocular movements intact  Conjunctiva/sclera: Conjunctivae normal       Pupils: Pupils are equal, round, and reactive to light  Neck:      Thyroid: No thyroid mass or thyromegaly  Vascular: No carotid bruit or JVD  Trachea: No tracheal deviation  Cardiovascular:      Rate and Rhythm: Normal rate and regular rhythm  Heart sounds: Normal heart sounds  No murmur heard  No gallop  Pulmonary:      Effort: Pulmonary effort is normal  No respiratory distress  Breath sounds: Normal breath sounds  No wheezing or rales  Musculoskeletal:         General: Normal range of motion  Right lower leg: No edema  Left lower leg: No edema  Lymphadenopathy:      Cervical: No cervical adenopathy  Skin:     Findings: No rash  Neurological:      General: No focal deficit present  Mental Status: She is alert and oriented to person, place, and time        Cranial Nerves: No cranial nerve deficit  Psychiatric:         Mood and Affect: Mood normal          Behavior: Behavior normal          Thought Content:  Thought content normal          Cognition and Memory: Cognition normal          Judgment: Judgment normal           Blas Middleton MD Take full course of antibiotics as prescribed for pneumonia.      Please rest and remain well hydrated with plenty of fluids.  You can take motrin 600-800mg and tylenol 650mg every 3 hours, switching between the two for pain/bodyaches or fevers (>100.4F/>38C)    Call to arrange follow up with your primary care doctor within 2-3 days     Return to ED if you have chest pain, difficulty breathing, vomiting, abdominal pain, fainting or other concerns      Community-Acquired Pneumonia, Adult      Pneumonia is a lung infection that causes inflammation and the buildup of mucus and fluids in the lungs. This may cause coughing and difficulty breathing. Community-acquired pneumonia is pneumonia that develops in people who are not, and have not recently been, in a hospital or other health care facility.    Usually, pneumonia develops as a result of an illness that is caused by a virus, such as the common cold and the flu (influenza). It can also be caused by bacteria or fungi. While the common cold and influenza can pass from person to person (are contagious), pneumonia itself is not considered contagious.      What are the causes?     This condition may be caused by:  •Viruses.      •Bacteria.      •Fungi, such as molds or mushrooms.        What increases the risk?    The following factors may make you more likely to develop this condition:•Having certain medical conditions, such as:  •A long-term (chronic) disease, which may include chronic obstructive pulmonary disease (COPD), asthma, heart failure, cystic fibrosis, diabetes, kidney disease, sickle cell disease, and human immunodeficiency virus (HIV).      •A condition that increases the risk of breathing in (aspirating) mucus and other fluids from your mouth and nose.      •A weakened body defense system (immune system).        •Having had your spleen removed (splenectomy). The spleen is the organ that helps fight germs and infections.      •Not cleaning your teeth and gums well (poor dental hygiene).      •Using tobacco products.      •Traveling to places where germs that cause pneumonia are present.      •Being near certain animals, or animal habitats, that have germs that cause pneumonia.      •Being older than 65 years of age.        What are the signs or symptoms?    Symptoms of this condition include:  •A dry cough or a wet (productive) cough.      •A fever.      •Sweating or chills.      •Chest pain, especially when breathing deeply or coughing.      •Fast breathing, difficulty breathing, or shortness of breath.      •Tiredness (fatigue).      •Muscle aches.        How is this diagnosed?     This condition may be diagnosed based on your medical history or a physical exam. You may also have tests, including:  •Chest X-rays.      •Tests of the level of oxygen and other gases in your blood.    •Tests of:  •Your blood.      •Mucus from your lungs (sputum).      •Fluid around your lungs (pleural fluid).      •Your urine.        If your pneumonia is severe, other tests may be done to learn more about the cause.      How is this treated?    Treatment for this condition depends on many factors, such as the cause of your pneumonia, your medicines, and other medical conditions that you have.    For most adults, pneumonia may be treated at home. In some cases, treatment must happen in a hospital and may include:•Medicines that are given by mouth (orally) or through an IV, including:  •Antibiotic medicines, if bacteria caused the pneumonia.      •Medicines that kill viruses (antiviral medicines), if a virus caused the pneumonia.        •Oxygen therapy.      Severe pneumonia, although rare, may require the following treatments:  •Mechanical ventilation.This procedure uses a machine to help you breathe if you cannot breathe well on your own or maintain a safe level of blood oxygen.      •Thoracentesis. This procedure removes any buildup of pleural fluid to help with breathing.        Follow these instructions at home:     Medicines     •Take over-the-counter and prescription medicines only as told by your health care provider.      •Take cough medicine only if you have trouble sleeping. Cough medicine can prevent your body from removing mucus from your lungs.      •If you were prescribed an antibiotic medicine, take it as told by your health care provider. Do not stop taking the antibiotic even if you start to feel better.        Lifestyle                   • Do not drink alcohol.      • Do not use any products that contain nicotine or tobacco, such as cigarettes, e-cigarettes, and chewing tobacco. If you need help quitting, ask your health care provider.      •Eat a healthy diet. This includes plenty of vegetables, fruits, whole grains, low-fat dairy products, and lean protein.      General instructions     •Rest a lot and get at least 8 hours of sleep each night.      •Sleep in a partly upright position at night. Place a few pillows under your head or sleep in a reclining chair.      •Return to your normal activities as told by your health care provider. Ask your health care provider what activities are safe for you.      •Drink enough fluid to keep your urine pale yellow. This helps to thin the mucus in your lungs.      •If your throat is sore, gargle with a salt–water mixture 3–4 times a day or as needed. To make a salt–water mixture, completely dissolve ½–1 tsp (3–6 g) of salt in 1 cup (237 mL) of warm water.      •Keep all follow-up visits as told by your health care provider. This is important.        How is this prevented?    You can lower your risk of developing community-acquired pneumonia by:•Getting the pneumonia vaccine. There are different types and schedules of pneumonia vaccines. Ask your health care provider which option is best for you. Consider getting the pneumonia vaccine if:  •You are older than 65 years of age.      •You are 19–65 years of age and are receiving cancer treatment, have chronic lung disease, or have other medical conditions that affect your immune system. Ask your health care provider if this applies to you.        •Getting your influenza vaccine every year. Ask your health care provider which type of vaccine is best for you.      •Getting regular dental checkups.      •Washing your hands often with soap and water for at least 20 seconds. If soap and water are not available, use hand .        Contact a health care provider if you have:    •A fever.      •Trouble sleeping because you cannot control your cough with cough medicine.        Get help right away if:    •Your shortness of breath becomes worse.      •Your chest pain increases.      •Your sickness becomes worse, especially if you are an older adult or have a weak immune system.      •You cough up blood.      These symptoms may represent a serious problem that is an emergency. Do not wait to see if the symptoms will go away. Get medical help right away. Call your local emergency services (911 in the [place] Do not drive yourself to the hospital.       Summary    •Pneumonia is an infection of the lungs.      •Community-acquired pneumonia develops in people who have not been in the hospital. It can be caused by bacteria, viruses, or fungi.      •This condition may be treated with antibiotics or antiviral medicines.      •Severe pneumonia may require a hospital stay and treatment to help with breathing.      This information is not intended to replace advice given to you by your health care provider. Make sure you discuss any questions you have with your health care provider.

## 2023-02-15 DIAGNOSIS — E03.9 ACQUIRED HYPOTHYROIDISM: Primary | ICD-10-CM

## 2023-02-15 LAB
ALBUMIN SERPL-MCNC: 4.5 G/DL (ref 3.8–4.8)
ALBUMIN/GLOB SERPL: 2.1 {RATIO} (ref 1.2–2.2)
ALP SERPL-CCNC: 88 IU/L (ref 44–121)
ALT SERPL-CCNC: 32 IU/L (ref 0–32)
AST SERPL-CCNC: 25 IU/L (ref 0–40)
BASOPHILS # BLD AUTO: 0.1 X10E3/UL (ref 0–0.2)
BASOPHILS NFR BLD AUTO: 1 %
BILIRUB SERPL-MCNC: 0.6 MG/DL (ref 0–1.2)
BUN SERPL-MCNC: 13 MG/DL (ref 8–27)
BUN/CREAT SERPL: 21 (ref 12–28)
CALCIUM SERPL-MCNC: 9.6 MG/DL (ref 8.7–10.3)
CHLORIDE SERPL-SCNC: 105 MMOL/L (ref 96–106)
CHOLEST SERPL-MCNC: 181 MG/DL (ref 100–199)
CHOLEST/HDLC SERPL: 3.4 RATIO (ref 0–4.4)
CO2 SERPL-SCNC: 22 MMOL/L (ref 20–29)
CREAT SERPL-MCNC: 0.63 MG/DL (ref 0.57–1)
EGFR: 98 ML/MIN/1.73
EOSINOPHIL # BLD AUTO: 0.2 X10E3/UL (ref 0–0.4)
EOSINOPHIL NFR BLD AUTO: 3 %
ERYTHROCYTE [DISTWIDTH] IN BLOOD BY AUTOMATED COUNT: 11.7 % (ref 11.7–15.4)
EST. AVERAGE GLUCOSE BLD GHB EST-MCNC: 117 MG/DL
GLOBULIN SER-MCNC: 2.1 G/DL (ref 1.5–4.5)
GLUCOSE SERPL-MCNC: 112 MG/DL (ref 70–99)
HBA1C MFR BLD: 5.7 % (ref 4.8–5.6)
HCT VFR BLD AUTO: 40.8 % (ref 34–46.6)
HDLC SERPL-MCNC: 54 MG/DL
HGB BLD-MCNC: 13.9 G/DL (ref 11.1–15.9)
IMM GRANULOCYTES # BLD: 0 X10E3/UL (ref 0–0.1)
IMM GRANULOCYTES NFR BLD: 0 %
LDLC SERPL CALC-MCNC: 87 MG/DL (ref 0–99)
LYMPHOCYTES # BLD AUTO: 1.8 X10E3/UL (ref 0.7–3.1)
LYMPHOCYTES NFR BLD AUTO: 26 %
MCH RBC QN AUTO: 31.2 PG (ref 26.6–33)
MCHC RBC AUTO-ENTMCNC: 34.1 G/DL (ref 31.5–35.7)
MCV RBC AUTO: 92 FL (ref 79–97)
MONOCYTES # BLD AUTO: 0.6 X10E3/UL (ref 0.1–0.9)
MONOCYTES NFR BLD AUTO: 9 %
NEUTROPHILS # BLD AUTO: 4.2 X10E3/UL (ref 1.4–7)
NEUTROPHILS NFR BLD AUTO: 61 %
PLATELET # BLD AUTO: 181 X10E3/UL (ref 150–450)
POTASSIUM SERPL-SCNC: 4.4 MMOL/L (ref 3.5–5.2)
PROT SERPL-MCNC: 6.6 G/DL (ref 6–8.5)
RBC # BLD AUTO: 4.46 X10E6/UL (ref 3.77–5.28)
SL AMB VLDL CHOLESTEROL CALC: 40 MG/DL (ref 5–40)
SODIUM SERPL-SCNC: 140 MMOL/L (ref 134–144)
TRIGL SERPL-MCNC: 239 MG/DL (ref 0–149)
TSH SERPL DL<=0.005 MIU/L-ACNC: 0.14 UIU/ML (ref 0.45–4.5)
WBC # BLD AUTO: 6.9 X10E3/UL (ref 3.4–10.8)

## 2023-02-28 ENCOUNTER — OFFICE VISIT (OUTPATIENT)
Dept: BARIATRICS | Facility: CLINIC | Age: 68
End: 2023-02-28

## 2023-02-28 VITALS
BODY MASS INDEX: 35.55 KG/M2 | HEART RATE: 69 BPM | SYSTOLIC BLOOD PRESSURE: 118 MMHG | DIASTOLIC BLOOD PRESSURE: 78 MMHG | HEIGHT: 66 IN | WEIGHT: 221.2 LBS

## 2023-02-28 DIAGNOSIS — E66.9 CLASS 2 OBESITY WITH BODY MASS INDEX (BMI) OF 35.0 TO 35.9 IN ADULT: ICD-10-CM

## 2023-02-28 NOTE — PROGRESS NOTES
Assessment/Plan:  Chavez Dewey was seen today for consult  Diagnoses and all orders for this visit:    Class 2 obesity with body mass index (BMI) of 35 0 to 35 9 in adult  -     Ambulatory Referral to Weight Management         - Discussed options of HealthyCORE-Intensive Lifestyle Intervention Program, Very Low Calorie Diet-VLCD, Conservative Program, Juliette-En-Y Gastric Bypass and Vertical Sleeve Gastrectomy and the role of weight loss medications  - Explained the importance of making lifestyle changes first before starting any anti-obesity medications  Patient should demonstrate lifestyle changes first before anti-obesity medication can be initiated  - Patient is interested in pursuing HealthyCORE-Intensive Lifestyle Intervention Program  - Initial weight loss goal of 5-10% weight loss for improved health  - Weight loss can improve patient's co-morbid conditions and/or prevent weight-related complications  Goals:  Do not skip any meals! Food log (ie ) www myfitnesspal com,sparkpeople  com,loseit com,calorieking  com,etc  baritastic (use skinnytaste  com, dietdoctor  com or smartphone trevor [employer] for recipes)  No sugary beverages  At least 64oz of water daily  Increase physical activity by 10 minutes daily  Gradually increase physical activity to a goal of 5 days per week for 30 minutes of MODERATE intensity PLUS 2 days per week of FULL BODY resistance training (use smartphone apps Dashbell, Home Workout, etc )      Total time spent: 30 min, with >50% face-to-face time spent counseling patient on nonsurgical interventions for the treatment of excess weight  Discussed in detail nonsurgical options including intensive lifestyle intervention program, very low-calorie diet program and conservative program   Discussed the role of weight loss medications  Counseled patient on diet behavior and exercise modification for weight loss        Follow up in approximately 3 months with Non-Surgical Physician/Advanced Practitioner  Subjective:   Chief Complaint   Patient presents with   • Consult     Pt is here for MWM consult  Patient ID: Afsaneh Bloom  is a 78 y o  female with excess weight/obesity here to pursue weight management  Previous notes and records have been reviewed  Past Medical History:   Diagnosis Date   • Anxiety    • Closed fracture of distal radius and ulna, left, initial encounter     Last Assessed:  5/13/16   • CPAP (continuous positive airway pressure) dependence    • Depression    • Disease of thyroid gland    • Fibromyalgia    • GERD (gastroesophageal reflux disease)    • Hyperlipidemia    • Sleep apnea     pt uses c-pap   • Sleep apnea, obstructive    • Squamous cell carcinoma of skin     Last Assessed:  1/17/17     Past Surgical History:   Procedure Laterality Date   • COLON SURGERY     • COLONOSCOPY N/A 07/14/2016    Procedure: COLONOSCOPY WITH ENDOCLIPS AND INJECTION FOR HEMOSTASIS;  Surgeon: Neymar Lloyd MD;  Location: [facility];  Service:    • COLONOSCOPY  08/2019   • MOHS RECONSTRUCTION     • NASAL FRACTURE SURGERY     • WI OPTX DSTL RADL I-ARTIC FX/EPIPHYSL SEP 3 FRAG Left 02/25/2016    Procedure: OPEN REDUCTION W/ INTERNAL FIXATION (ORIF)DISTAL  RADIUS ;  Surgeon: Grant Quinones MD;  Location: [facility];  Service: Orthopedics       HPI:  Patient presents for weight management consultation      Wt Readings from Last 20 Encounters:   02/28/23 100 kg (221 lb 3 2 oz)   02/13/23 102 kg (224 lb)   01/10/23 99 8 kg (220 lb)   10/19/22 99 3 kg (219 lb)   02/10/22 98 kg (216 lb)   01/31/22 98 3 kg (216 lb 12 8 oz)   01/06/22 101 kg (222 lb 3 2 oz)   10/14/21 98 4 kg (217 lb)   06/17/21 96 6 kg (213 lb)   02/08/21 99 8 kg (220 lb)   10/08/20 95 7 kg (211 lb)   09/09/20 96 6 kg (213 lb)   07/31/20 98 kg (216 lb)   07/01/20 98 kg (216 lb)   01/27/20 95 3 kg (210 lb)   10/07/19 95 3 kg (210 lb)   09/19/19 97 5 kg (215 lb)   01/24/19 99 3 kg (219 lb)   05/09/18 95 3 kg (210 lb)   05/07/18 95 3 kg (210 lb)     Obesity/Excess Weight:  Severity: Moderate  Onset:  2000    Modifiers: Diet and Exercise and Commercial Weight Loss Programs-ie  Weight Watchers, Larsen Hermelinda, Nutrisystem, etc   Contributing factors: Poor Food Choices and Insufficient Physical Activity  Associated symptoms: comorbid conditions and increased joint pain    Admits to not watching portions  B- cottage cheese and wheat thins  S- no  L- turkey sandwich  S- Foot Locker chips or pretzels  D- protein and veggies  No carbs such as rice or pasta  S- diet pudding    Hydration: 8-16oz water daily, 2 cups coffee plain, diet soda 2x/wk  Alcohol: no (sober 3yrs)  Smoking: no (quit 2002)  Exercise: water aerobics 2hrs 4x/wk every other wk  Occupation: retired  Sleep: 8hrs  STOP bang: DIONE on CPAP    The following portions of the patient's history were reviewed and updated as appropriate: allergies, current medications, past family history, past medical history, past social history, past surgical history, and problem list     Review of Systems   Constitutional: Negative for fever  Respiratory: Negative for shortness of breath  Cardiovascular: Negative for chest pain  Gastrointestinal: Negative for abdominal pain and blood in stool  Genitourinary: Negative for dysuria and hematuria  Musculoskeletal: Positive for arthralgias  Negative for myalgias  Skin: Negative for rash  Neurological: Negative for headaches  Psychiatric/Behavioral: Negative for dysphoric mood  The patient is not nervous/anxious  Objective:  /78   Pulse 69   Ht 5' 6" (1 676 m)   Wt 100 kg (221 lb 3 2 oz)   BMI 35 70 kg/m²   Constitutional: Well-developed, well-nourished and Obese Body mass index is 35 7 kg/m²  Korin Henderson HEENT: No conjunctival injection  Pulmonary: No increased work of breathing or signs of respiratory distress  CV: Well-perfused  GI: Obese  Non-distended  MSK: No edema   Neuro: Oriented to person, place and time  Normal Speech   Normal gait   Psych: Normal affect and mood  Labs and Imaging  Recent labs and imaging have been personally reviewed    Lab Results   Component Value Date    WBC 6 9 02/14/2023    HGB 13 9 02/14/2023    HCT 40 8 02/14/2023    MCV 92 02/14/2023     02/14/2023     Lab Results   Component Value Date     01/20/2017    SODIUM 140 02/14/2023    K 4 4 02/14/2023     02/14/2023    CO2 22 02/14/2023    ANIONGAP 11 04/15/2014    AGAP 11 02/16/2017    BUN 13 02/14/2023    CREATININE 0 63 02/14/2023    GLUC 112 (H) 02/14/2023    CALCIUM 9 6 02/16/2017    AST 25 02/14/2023    ALT 32 02/14/2023    ALKPHOS 81 02/16/2017    PROT 6 8 01/20/2017    TP 6 6 02/14/2023    BILITOT 0 5 01/20/2017    TBILI 0 6 02/14/2023    EGFR 98 02/14/2023     Lab Results   Component Value Date    HGBA1C 5 7 (H) 02/14/2023     Lab Results   Component Value Date    OZG5OBTHCPJP 1 130 01/20/2017    TSH 0 145 (L) 02/14/2023     Lab Results   Component Value Date    CHOLESTEROL 181 02/14/2023     Lab Results   Component Value Date    HDL 54 02/14/2023     Lab Results   Component Value Date    TRIG 239 (H) 02/14/2023     Lab Results   Component Value Date    LDLCALC 87 02/14/2023

## 2023-03-13 ENCOUNTER — NURSE TRIAGE (OUTPATIENT)
Age: 68
End: 2023-03-13

## 2023-03-13 DIAGNOSIS — Z80.0 FAMILY HISTORY OF COLON CANCER IN FATHER: ICD-10-CM

## 2023-03-13 DIAGNOSIS — Z86.010 PERSONAL HISTORY OF COLONIC POLYPS: Primary | ICD-10-CM

## 2023-03-13 DIAGNOSIS — K57.90 DIVERTICULOSIS: ICD-10-CM

## 2023-03-13 NOTE — PROGRESS NOTES
Weight Management Medical Nutrition Assessment  Ty Christine is here today for Healthy Core initial visit  Current weight 220 7#  Per dietary recall, suspect excess calories from unmeasured grazing and portions outside of calorie range for weight loss  Discussed benefits of interval eating, adequate protein intake, and mindful eating  Recommend patient begin measuring food items  Will discuss food logging at month 1 f/u  Hydration inadequate  Discussed benefits of adequate hydration and techniques to increase intake such as adding 8-16 oz with meals and snacks, setting alarms, and using a single pitcher to assess daily intake  Patient has access to [facility]  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  Completed a body composition using SECA scale and will review results with patient at Month 1 f/u       Likes: eggs, cheese, nuts, peanut butter (struggles with portions), [place] yogurt, cottage cheese, tuna, hummus    Patient seen by Medical Provider in past 6 months:  yes  Requested to schedule appointment with Medical Provider: No    Anthropometric Measurements  Start Weight (#): 220 7# (3/14/23)  Current Weight (#): n/a  TBW % Change from start weight: n/a  Ideal Body Weight (#): 130# (66")  Goal Weight (#): ST-10% LTG: "get 50# off"    Weight Loss History  Previous weight loss attempts: Commercial Programs (Puridify/HepatoChemCorp, Pool Blunt, etc )  Self Created Diets (Portion Control, Healthy Food Choices, etc ), Exercise    Food and Nutrition Related History  Wake up: 7:30 am   Bed Time: 9 pm  DIONE on CPAP, sleeps well   Gets up at 4:30 am to let dog out     Dietary Recall  4 am: Atkins lemon bar Or allegra's candy Or SF candy  Breakfast (9 am): cottage cheese + wheat thins (unmeasured) + clementines    Snack: --  Lunch (12:30 pm): turkey sandwich (1 slice bread + 1 slice turkey, ham, and swiss cheese + creamy horseradish) w/ Foot Locker chips (70 kcal/bag)   Snack: pretzels (unmeasured) Or hard cheese Or guacamole w/ vegetables Or nuts (unmeasured) Or SF candy  Dinner (5:30 pm): protein w/ veggies or salad Or Healthy Choice meal   Snack: SF pudding w/ redi-whip Or SF fudge bar Or SF candy    Beverages: Water, 2 cups coffee (black), diet soda 2x/week at dinner, and alcohol (2 glasses weekly)  Volume of beverage intake: 8-16 oz water     Weekends: Has pizza every Friday (2 slices with 2 glasses wine)  Cravings: salty   Trouble area of day: snacking between meals    Frequency of Eating out: 2x/week (pizza and diner)   Food restrictions: none  Cooking: self  Food Shopping: self     Occupation: Works part-time at [employer] for [employer]     Physical Activity  Activity: Water aerobics (2 hours, 4x/week, every other week: total of 16 hours/month), no activity otherwise [has access to [employer] + pool]   Frequency:intermittently  Physical limitations/barriers to exercise: none reported     Estimated Needs  Energy  SECA: BMR: 1644 X 1 3 -1000 = 1,137 kcal     [place] psicofxp Needs (needs at 221  2#)  BMR: 1,560 kcal  Maintenance calories (sedentary): 1,872 kcal  1-2#/ week loss (sedentary): 872-1,372 kcal  1-2#/ week loss (lightly active): 1,145-1,645 kcal    Protein: 71-89 grams (1 2-1 5g/kg IBW)  Fluid: 69 ounces (35mL/kg IBW)    Nutrition Diagnosis  Yes; Overweight/obesity related to Excess energy intake as evidenced by BMI more than normative standard for age and sex (obesity-grade II 35-39  9)     Nutrition Intervention    Nutrition Prescription  Calories: 1,000-1,400 kcal (depending on activity level)  Protein: 80-90 g  Fluid: 69+ ounces    Meal Plan ([place]/Pro)  Breakfast: 200-250/15-20  Snack: --  Lunch: 300-350/25  Snack: 100-150/5+  Dinner: 300-400/15-25  Snack: 100-150/5+    Nutrition Education  Healthy Core Manual   Calorie controlled menu  Lean protein food choices  Healthy snack options  Food journaling tips    Nutrition Counseling  Strategies: meal planning, portion sizes, healthy snack choices, hydration, fiber intake, protein intake, exercise, food logging    Monitoring and Evaluation:    Evaluation criteria  Energy Intake  Meet protein needs  Maintain adequate hydration  Monitor weekly weight  Meal planning/preparation  Food journal   Decreased portions at mealtimes and snacks  Physical activity     Barriers to learning:none  Readiness to change: Preparation:  (Getting ready to change)   Comprehension: good  Expected Compliance: good

## 2023-03-13 NOTE — TELEPHONE ENCOUNTER
Patient calling to schedule colonoscopy with Dr Jimenez Been  Verified information  Please call to schedule

## 2023-03-13 NOTE — LETTER
[address] [facility] [address]      FLEXIBLE COLONOSCOPY INSTRUCTIONS  PLEASE NOTE    AS OF JUNE 1, 2014, OUR OFFICE REQUIRES 72 HOURS NOTICE OF CANCELLATION/RESCHEDULE OF A PROCEDURE TO AVOID INCURRING A MISSED APPOINTMENT FEE  Your Colonoscopy Procedure has been scheduled at:  [address] [facility]  [address] [address], [place], [place] [phone]    The Date of your Procedure is: March 27, 2023    Dr Matias Johnson  will be performing the procedure  [facility]  will contact you the day prior with the arrival and procedure times  The total time at the facility will be approximately 1 1/2 hours  Please bring to your procedure at [facility] :   1  Insurance Cards and referrals if required by [employer] company   2  Valid Photo ID   3  Power of  Form if required    Use the bowel preparation as directed  Check with your family doctor if you are taking a blood thinner (Coumadin, Plavix, Xarelto, Pradaxa, Gingko biloba, Ginseng, Feverfew, St  Elias's Wort)  We suggest stopping these for 3 days  Special instructions may be needed if you are taking aspirin or any aspirin-containing medication  Check with your family physician  If you are on DIABETIC MEDICATION (tablets or insulin) your doctor may make changes in your preparation  Take all medications usual unless otherwise instructed  Feel free to call the physician's office to answer any questions or address any concerns you have regarding your procedure or preparation  A nurse will be happy to assist you  Because anesthesia is given to you during the procedure, the center requires that you be discharged under supervision of an adult to take you home after the procedure is performed  They will also need to sign as a witness on your discharge instructions  Public Transportation such as VAST, BUS, TAXI is Not Acceptable      It is important you notify our office of any insurance changes prior to your procedure and, if necessary, supply us with referrals from your primary care physician  COLONOSCOPY PREPARATION INSTRUCTIONS    Purchase (prescription not required):  · 238 gram bottle of Miralax® (Glycolax®)  · 4 Dulcolax® (Bisacodyl) Laxative Tablets  · 64 oz  bottle of Gatorade® or your preference of a non-carbonated clear liquid - NOT RED OR PURPLE     One Day Prior to Colonoscopy Procedure  · Nothing to eat the day before your procedure, only clear liquids  · It is important that you drink plenty of clear liquids throughout the day to prevent dehydration  Clear Liquids include:  o Water/Iced Tea/Lemonade/Gatorade®/Black Coffee or tea (no milk or creamers  o Soft drinks: orange, ginger ale, cola, Pepsi®, Sprite®, 7Up®  o Robin-Aid® (lemonade or orange flavors only)  o Strained fruit juices without pulp such as apple, white grape, white cranberry  o Jell-O®, lemon, lime or orange (no fruit or toppings)  o Popsicles, [place] Ice (No Ice Cream, sherbets, or fruit bars)  o Chicken or beef bouillon/broth  DO NOT EAT OR DRINK ANYTHING RED OR PURPLE  DO NOT DRINK ANY ALCOHOLIC BEVERAGES  DIABETIC PATIENTS: Consult your physician    At 4:00 pm, take (2) Dulcolax® (Bisacodyl) Laxative Tablets  Swallow the tablets whole with an 8 oz  glass of water  At 8:00 pm, take the additional (2) Dulcolax® (Bisacodyl) Laxative Tablets with 8 oz  of water  The package may direct you not to exceed (2) tablets at any time but for the purpose of this examination, you should take (4) total     Mix the 238 gm of Miralax® in 64 oz  of Gatorade® and shake the solution until the Miralax® is dissolved  You will drink half (32 oz) of this solution this evening, beginning between 4 and 6 o'clock  Drink 8 oz glassfuls at your own pace  It may take several hours to drink the solution  Remember to stay close to toilet facilities      DAY OF COLONOSCOPY PROCEDURE    Five (5) hours before your procedure, drink the other half (32 oz) of the Miralax®/Gatorade® mixture within a two (2) hour period  This may require you to get up very early if you are scheduled for an early procedure  NOTHING IS TO BE TAKEN BY MOUTH 3 HOURS PRIOR TO PROCEDURE  If you use an inhaler, please bring it with you to your procedure

## 2023-03-13 NOTE — LETTER
[address] [facility] [address]        FLEXIBLE COLONOSCOPY INSTRUCTIONS  PLEASE NOTE    AS OF JUNE 1, 2014, OUR OFFICE REQUIRES 72 HOURS NOTICE OF CANCELLATION/RESCHEDULE OF A PROCEDURE TO AVOID INCURRING A MISSED APPOINTMENT FEE  Your Colonoscopy Procedure has been scheduled at:  [id number] [facility]  [address] [facility] [address] [place], [place] [phone]    The Date of your Procedure is: March 27, 2023      Dr Donte Kiran  will be performing the procedure  Report to the [facility] 45 minutes prior to the procedure  The total time at the facility will be approximately 1 1/2 hours  Please bring to your procedure at [facility]:   1  Insurance Cards and referrals if required by [employer] company   2  Valid Photo ID   3  Power of  Form if required    Use the bowel preparation as directed  Check with your family doctor if you are taking a blood thinner (Coumadin, Plavix, Xarelto, Pradaxa, Gingko biloba, Ginseng, Feverfew, St  Elias's Wort)  We suggest stopping these for 3 days  Special instructions may be needed if you are taking aspirin or any aspirin-containing medication  Check with your family physician  If you are on DIABETIC MEDICATION (tablets or insulin) your doctor may make changes in your preparation  Take all medications usual unless otherwise instructed  Feel free to call the physician's office to answer any questions or address any concerns you have regarding your procedure or preparation  A nurse will be happy to assist you  Because anesthesia is given to you during the procedure, the center requires that you be discharged under supervision of an adult to take you home after the procedure is performed  They will also need to sign as a witness on your discharge instructions  Public Transportation such as VAST, BUS, TAXI is Not Acceptable      It is important you notify our office of any insurance changes prior to your procedure and, if necessary, supply us with referrals from your primary care physician  COLONOSCOPY 2 DAY DOUBLE MIRALAX PREPARATION INSTRUCTIONS    Purchase (prescription not required):  2 238 gram bottles of Miralax® (Glycolax®)  8 Dulcolax® (Bisacodyl) Laxative Tablets   2 64 oz  bottles of Gatorade® or your preference of a non-carbonated clear liquid ** - NOT RED OR PURPLE  **You will prep over the course of 2 days to ensure that you are adequately prepped  **  Mix 1 238 gm bottle of Miralax® in 1 64 oz  bottle of Gatorade® and shake the solution until the Miralax® is dissolved  You will drink the Miralax/Gatorade mixture slowly throughout the entire day  Two Days Prior to the Colonoscopy Procedure  Referring to the following list, have a light breakfast and a light lunch, and clear liquids thereafter  *Clear liquids should not be red or purple*  Acceptable foods:  • White bread, refined pasta and cereals, and white rice   • Fresh fruits without peels or seeds, certain canned or well-cooked fruits   • Tender, ground, and well-cooked meat, fish, eggs, and poultry   • Milk and yogurt (usually limited to 2 cups per day), mild cheese, ricotta, cottage cheese   • Butter, mayonnaise, vegetable oils, margarine, plain gravies, and dressings   • Broth and strained soups from allowed foods   Acceptable Clear Fluids (Not Red or purple):  • No Milk Products  • Water/Iced Tea/Lemonade/Gatorade®/Black Coffee or tea (no milk or creamers  • Soft drinks: orange, ginger ale, cola, Pepsi®, Sprite®, 7Up®  • Robin-Aid® (lemonade or orange flavors only)  • Strained fruit juices without pulp such as apple, white grape, white cranberry  • Jell-O®, lemon, lime or orange (no fruit or toppings)  • Popsicles, [place] Ice (No Ice Cream, sherbets, or fruit bars)  • Chicken or beef bouillon/broth    At 1:00 pm, take (4) Dulcolax Tablets with 8 oz  of water    Swallow the tablets whole with a full glass of water  The package may direct you not to exceed (2) tablets at any time but for the purpose of this examination, you should take (4)  One Day Prior to Colonoscopy Procedure  Nothing to eat the day before your procedure, only clear liquids  • It is important that you drink plenty of clear liquids throughout the day to prevent dehydration  • DO NOT EAT OR DRINK ANYTHING RED OR PURPLE  • DO NOT DRINK ANY ALCOHOLIC BEVERAGES  • DIABETIC PATIENTS: Consult your physician    At 4:00 pm, take 2 Dulcolax® (Bisacodyl) Laxative Tablets  At 8:00 pm, take 2 Dulcolax® (Bisacodyl) Laxative Tablets  Swallow the tablets whole with an 8 oz  glass of water  Mix the 238 gm of Miralax® in 64 oz  of Gatorade® and shake the solution until the Miralax® is dissolved  You will drink half (32 oz) of this solution this evening, beginning between 4 and 6 o'clock  Drink 8 oz glassfuls at your own pace  Remember to stay close to toilet facilities  DAY OF COLONOSCOPY PROCEDURE  Five (5) hours before your procedure, drink the other half (32 oz) of the Miralax®/Gatorade® mixture within a two (2) hour period  This may require you to get up very early if you are scheduled for an early procedure  NOTHING IS TO BE TAKEN BY MOUTH 3 HOURS PRIOR TO PROCEDURE

## 2023-03-14 ENCOUNTER — OFFICE VISIT (OUTPATIENT)
Dept: BARIATRICS | Facility: CLINIC | Age: 68
End: 2023-03-14

## 2023-03-14 VITALS — BODY MASS INDEX: 35.47 KG/M2 | WEIGHT: 220.7 LBS | HEIGHT: 66 IN

## 2023-03-14 DIAGNOSIS — R63.5 ABNORMAL WEIGHT GAIN: Primary | ICD-10-CM

## 2023-03-16 NOTE — TELEPHONE ENCOUNTER
[facility] 4/4/22 w/ Dr Anton Jacobsen;  Indication: abdominal pain  Impression: Large, severe extensive diverticula containing stool and causing mild luminal narrowing in the descending colon, sigmoid colon and rectosigmoid  Small, internal hemorrhoids  6 month recall w/ 2 day prep due to fair prep  BMI 35  FM hx of colon CA in father  Called pt, no answer; left v/m for pt to call ofc back to schedule

## 2023-03-16 NOTE — TELEPHONE ENCOUNTER
Scheduled for 3/27/2023, 9 [facility],  [address], [place]  Mailed information to pt  Additional Information  • Negative: Is this a new patient under the age of 49? • Negative: Is this a patient over the age of 76 that has never had a colonoscopy? • Negative: Is this patient over the age of [de-identified] with a history of cancer and/or polyps? • Negative: Has the patient had a colonoscopy within the last year and when was it? • Affirmative: Does the patient have a family history of colon or rectal cancer? Father  • Negative: Does the patient experience chest pain or shortness of breath when climbing stairs? • Negative: Does the patient require oxygen support? • Negative: Has the patient had a cardiac procedure within the last year? • Negative: Has the patient had a stroke or blood clots? • Negative: Is the patient currently on any blood thinners such as Coumadin, Plavix, Eliquis, Pradaxa, Xarelto, etc?  (Check the patient's current medication list and document reason for taking medication)  • Negative: Has the patient had a knee or hip replacement within the last 6 months that required antibiotic coverage prior to the procedure? • Negative: Advised Patient - Initial Assessment  1  Patient advised that our office requires 72 hours notice for a cancellation of a procedure to avoid incurring a missed appointment fee  2  Asked patient to please contact insurance company to ask how they will be processing the individual claim for the procedure  3  Advised patient that she is welcome to see the doctor in the office prior to the procedure  4  Advised patient to be at the location of the procedure at 30 minutes prior to the scheduled time      Protocols used: [facility] COLONOSCOPY SCHEDULING

## 2023-03-21 ENCOUNTER — CLINICAL SUPPORT (OUTPATIENT)
Dept: BARIATRICS | Facility: CLINIC | Age: 68
End: 2023-03-21

## 2023-03-21 VITALS — BODY MASS INDEX: 35.23 KG/M2 | HEIGHT: 66 IN | WEIGHT: 219.2 LBS

## 2023-03-21 DIAGNOSIS — R63.5 ABNORMAL WEIGHT GAIN: Primary | ICD-10-CM

## 2023-03-27 NOTE — PROGRESS NOTES
"Weight Management Medical Nutrition Assessment  Sreekanth Mello is here today for Month 1 Healthy Core f/u  Current Weight: 215 7#  Patient has lost 5# x 2 weeks  Patient has made excellent lifestyle changes such as reducing grazing, practicing portion control and mindfulness, and increasing fluid intake appropriately  Will work towards eventual goal of 64-70 oz/day but is currently increasing as advised  Continues with water aerboics and has been more mindful of day-to-day activity  Reviewed SECA body comp  Keep up the good work!     Likes: eggs, cheese, nuts, peanut butter (struggles with portions), [place] yogurt, cottage cheese, tuna, hummus    Patient seen by Medical Provider in past 6 months:  yes  Requested to schedule appointment with Medical Provider: No    Anthropometric Measurements  Start Weight (#): 220 7# (3/14/23)  Current Weight (#): 215 7#  TBW % Change from start weight: 2 3%  Ideal Body Weight (#): 130# (66\")  Goal Weight (#): ST-10% LTG: \"get 50# off\"    Weight Loss History  Previous weight loss attempts: Commercial Programs ([employer], Nidhi Whiteside, etc )  Self Created Diets (Portion Control, Healthy Food Choices, etc ), Exercise    Food and Nutrition Related History  Wake up: 7:30 am   Bed Time: 9 pm  DIONE on CPAP, sleeps well   Gets up at 4:30 am to let dog out     Dietary Recall  [Did not assess recall at 3/28/23 visit]  4 am: Atkins lemon bar Or allegra's candy Or SF candy  Breakfast (9 am): cottage cheese + wheat thins (unmeasured) + clementines    Snack: --  Lunch (12:30 pm): turkey sandwich (1 slice bread + 1 slice turkey, ham, and swiss cheese + creamy horseradish) w/ [employer] chips (70 kcal/bag)   Snack: pretzels (unmeasured) Or hard cheese Or guacamole w/ vegetables Or nuts (unmeasured) Or SF candy  Dinner (5:30 pm): protein w/ veggies or salad Or Healthy Choice meal   Snack: SF pudding w/ redi-whip Or SF fudge bar Or SF candy    Beverages: Water, 2 cups coffee (black), diet soda 2x/week at dinner, " and alcohol (2 glasses weekly)  Volume of beverage intake: 30-40 oz water     Weekends: Has pizza every Friday (2 slices with 2 glasses wine)  Cravings: salty   Trouble area of day: snacking between meals    Frequency of Eating out: 2x/week (pizza and diner)   Food restrictions: none  Cooking: self  Food Shopping: self     Occupation: Works part-time at [employer] for [address] [employer] Activity  Activity: Water aerobics (2 hours, 4x/week, every other week: total of 16 hours/month) + walking (3-4x/week; 1 mile) [place] access to [employer] + pool]   Frequency:intermittently  Physical limitations/barriers to exercise: none reported     Estimated Needs  Energy  SECA: BMR: 1644 X 1 3 -1000 = 1,137 kcal     Vancleave Azuro Needs (needs at 221  2#)  BMR: 1,560 kcal  Maintenance calories (sedentary): 1,872 kcal  1-2#/ week loss (sedentary): 872-1,372 kcal  1-2#/ week loss (lightly active): 1,145-1,645 kcal    Protein: 71-89 grams (1 2-1 5g/kg IBW)  Fluid: 69 ounces (35mL/kg IBW)    Nutrition Diagnosis  Yes; Overweight/obesity related to Excess energy intake as evidenced by BMI more than normative standard for age and sex (obesity-grade I 26-30  9)     Nutrition Intervention    Nutrition Prescription  Calories: 1,000-1,400 kcal (depending on activity level)  Protein: 80-90 g  Fluid: 69+ ounces    Meal Plan (Aldair/Pro)  Breakfast: 200-250/15-20  Snack: --  Lunch: 300-350/25  Snack: 100-150/5+  Dinner: 300-400/15-25  Snack: 100-150/5+    Nutrition Education  Healthy Core Manual   Calorie controlled menu  Lean protein food choices  Healthy snack options  Food journaling tips    Nutrition Counseling  Strategies: meal planning, portion sizes, healthy snack choices, hydration, fiber intake, protein intake, exercise, food logging    Monitoring and Evaluation:    Evaluation criteria  Energy Intake  Meet protein needs  Maintain adequate hydration  Monitor weekly weight  Meal planning/preparation  Food journal   Decreased portions at mealtimes and snacks  Physical activity     Barriers to learning:none  Readiness to change: Action:  (Changing behavior)  Comprehension: good  Expected Compliance: good

## 2023-03-28 ENCOUNTER — OFFICE VISIT (OUTPATIENT)
Dept: BARIATRICS | Facility: CLINIC | Age: 68
End: 2023-03-28

## 2023-03-28 VITALS — BODY MASS INDEX: 34.67 KG/M2 | HEIGHT: 66 IN | WEIGHT: 215.7 LBS

## 2023-03-28 DIAGNOSIS — R63.5 ABNORMAL WEIGHT GAIN: Primary | ICD-10-CM

## 2023-03-30 ENCOUNTER — TELEPHONE (OUTPATIENT)
Dept: ADMINISTRATIVE | Facility: OTHER | Age: 68
End: 2023-03-30

## 2023-03-30 NOTE — TELEPHONE ENCOUNTER
----- Message from Arcadio Lawrence sent at 3/30/2023  9:47 AM EDT -----  Regarding: colonoscopy  Good morning,  Patient had a colonoscopy 03/27/2023  It is in her chart however not resulted in health maintenance  Thank you for your help in locating and resulting this care gap

## 2023-03-31 NOTE — TELEPHONE ENCOUNTER
Upon review of the In Basket request we were able to locate, review, and update the patient chart as requested for CRC: Colonoscopy  Any additional questions or concerns should be emailed to the Practice Liaisons via the appropriate education email address, please do not reply via In Basket      Thank you  Gillian Ngo MA

## 2023-04-04 ENCOUNTER — CLINICAL SUPPORT (OUTPATIENT)
Dept: BARIATRICS | Facility: CLINIC | Age: 68
End: 2023-04-04

## 2023-04-04 VITALS — BODY MASS INDEX: 35.03 KG/M2 | WEIGHT: 218 LBS | HEIGHT: 66 IN

## 2023-04-04 DIAGNOSIS — R63.5 ABNORMAL WEIGHT GAIN: Primary | ICD-10-CM

## 2023-09-06 ENCOUNTER — TELEPHONE (OUTPATIENT)
Dept: OBGYN CLINIC | Facility: CLINIC | Age: 68
End: 2023-09-06

## 2023-09-06 NOTE — TELEPHONE ENCOUNTER
Pt returning message. Pt is being seen for left hip pain, no, images, no sx, no physical therapy. A friend recommended Dr Heriberto Benavidez to her.

## 2023-09-08 ENCOUNTER — OFFICE VISIT (OUTPATIENT)
Dept: OBGYN CLINIC | Facility: CLINIC | Age: 68
End: 2023-09-08
Payer: COMMERCIAL

## 2023-09-08 ENCOUNTER — APPOINTMENT (OUTPATIENT)
Dept: RADIOLOGY | Facility: AMBULARY SURGERY CENTER | Age: 68
End: 2023-09-08
Attending: ORTHOPAEDIC SURGERY
Payer: COMMERCIAL

## 2023-09-08 VITALS
DIASTOLIC BLOOD PRESSURE: 81 MMHG | SYSTOLIC BLOOD PRESSURE: 129 MMHG | HEART RATE: 73 BPM | HEIGHT: 66 IN | BODY MASS INDEX: 36 KG/M2 | WEIGHT: 224 LBS

## 2023-09-08 DIAGNOSIS — M25.552 LEFT HIP PAIN: ICD-10-CM

## 2023-09-08 DIAGNOSIS — M16.12 PRIMARY OSTEOARTHRITIS OF ONE HIP, LEFT: Primary | ICD-10-CM

## 2023-09-08 PROCEDURE — 73502 X-RAY EXAM HIP UNI 2-3 VIEWS: CPT

## 2023-09-08 PROCEDURE — 99214 OFFICE O/P EST MOD 30 MIN: CPT | Performed by: ORTHOPAEDIC SURGERY

## 2023-09-08 NOTE — PROGRESS NOTES
Assessment:  1. Primary osteoarthritis of one hip, left  FL injection left hip (non arthrogram)      2. Left hip pain  XR hip/pelv 2-3 vws left if performed    FL injection left hip (non arthrogram)          Plan:    • Patient has left hip pain due to moderate osteoarthritis  • Weightbearing as tolerated   • Will be ordering left IA hip steroid injection under [facility]   • Continue taking OTC Aleve as needed for pain relief   • She is to follow up in  3 months          The above stated was discussed in layman's terms and the patient expressed understanding. All questions were answered to the patient's satisfaction. Subjective:   Marie Saini is a 78 y.o. female who presents today evualtion for left hip pain. She was referred by her PCP Dr. Joseph Pandey. She has been having left hip pain for over a year, denies any injuries. Is having pain over the anterolateral aspect of the left hip and occasional rating into the groin. She does have  rating pain down to mid lateral thigh at times. She states at rest she has left buttock pain. She does state occasional locking in the left hip. She does teach water aerobics and she states the week that she is off she is having increased pain in the hip. She has pain with laying on the left hip, crossing her legs over to the right, getting in and out of the car, and going up steps. SHe states occasionally she has been using a cane for assistance when ambulating due to the left hip pain. She occasions taking over-the-counter Aleve with some relief.       Review of systems negative unless otherwise specified in HPI    Past Medical History:   Diagnosis Date   • Anxiety    • Closed fracture of distal radius and ulna, left, initial encounter     Last Assessed:  5/13/16   • CPAP (continuous positive airway pressure) dependence    • Depression    • Disease of thyroid gland    • Fibromyalgia    • GERD (gastroesophageal reflux disease)    • Hyperlipidemia    • Sleep apnea     pt uses c-pap   • Sleep apnea, obstructive    • Squamous cell carcinoma of skin     Last Assessed:  17       Past Surgical History:   Procedure Laterality Date   • COLON SURGERY     • COLONOSCOPY N/A 2016    Procedure: COLONOSCOPY WITH ENDOCLIPS AND INJECTION FOR HEMOSTASIS;  Surgeon: Swapnil Tyler MD;  Location: [facility];  Service:    • COLONOSCOPY  2019   • MOHS RECONSTRUCTION     • NASAL FRACTURE SURGERY     • DE OPTX DSTL RADL I-ARTIC FX/EPIPHYSL SEP 3 FRAG Left 2016    Procedure: OPEN REDUCTION W/ INTERNAL FIXATION (ORIF)DISTAL  RADIUS ;  Surgeon: Harpal Mcdaniel MD;  Location: [facility];  Service: Orthopedics       Family History   Problem Relation Age of Onset   • Cancer Mother         Liver cancer   • Breast cancer Mother    • Cancer Father         Colon   • Colon cancer Father    • Cancer Sister        Social History     Occupational History   • Not on file   Tobacco Use   • Smoking status: Former     Packs/day: 1.00     Years: 30.00     Total pack years: 30.00     Types: Cigarettes     Start date: 1972     Quit date: 2002     Years since quittin.6     Passive exposure: Past   • Smokeless tobacco: Never   Vaping Use   • Vaping Use: Never used   Substance and Sexual Activity   • Alcohol use: Not Currently   • Drug use: No   • Sexual activity: Not Currently     Partners: Male     Birth control/protection: Male Sterilization         Current Outpatient Medications:   •  buPROPion (WELLBUTRIN XL) 300 mg 24 hr tablet, Take 1 tablet (300 mg total) by mouth daily, Disp: 90 tablet, Rfl: 3  •  Calcium Carbonate-Vit D-Min (CALCIUM 1200 PO), Take by mouth daily, Disp: , Rfl:   •  cholecalciferol (VITAMIN D3) 25 mcg (1,000 units) tablet, , Disp: , Rfl:   •  FLUoxetine (PROzac) 20 mg capsule, Take 1 capsule (20 mg total) by mouth daily, Disp: 90 capsule, Rfl: 3  •  levothyroxine 175 mcg tablet, Take 1 tablet (175 mcg total) by mouth daily in the early morning, Disp: 90 tablet, Rfl: 3  • Magnesium 200 MG TABS, Take 1 tablet by mouth daily, Disp: , Rfl:   •  Multiple Vitamins-Minerals (MULTIVITAMIN ADULT PO), Take 1 tablet by mouth daily, Disp: , Rfl:   •  Omega-3 Fatty Acids (FISH OIL) 1000 MG CPDR, Take 1 capsule by mouth daily, Disp: , Rfl:   •  pravastatin (PRAVACHOL) 20 mg tablet, Take 1 tablet (20 mg total) by mouth daily, Disp: 90 tablet, Rfl: 3  •  vitamin E 100 UNIT capsule, Take 1 capsule by mouth daily, Disp: , Rfl:   •  methylPREDNISolone 4 MG tablet therapy pack, Use as directed on package (Patient not taking: Reported on 2/28/2023), Disp: 21 each, Rfl: 0    Current Facility-Administered Medications:   •  EPINEPHrine PF (ADRENALIN) 1 mg/mL injection 0.5 mg, 0.5 mg, Intramuscular, Once, Mariana Madrid.  Andrea Walsh MD    Allergies   Allergen Reactions   • Erythromycin Shortness Of Breath     Reaction Date: [id number];    • Cefprozil      Reaction Date: [id number];    • Cephalosporins             Vitals:    09/08/23 0806   BP: 129/81   Pulse: 73       Objective:            Physical Exam  Physical Exam:      General Appearance:    Alert, cooperative, no distress, appears stated age   Head:    Normocephalic, without obvious abnormality, atraumatic   Eyes:    conjunctiva/corneas clear, both eyes         Nose:   Nares normal, septum midline, no drainage    Throat:   Lips normal; teeth and gums normal   Neck:    symmetrical, trachea midline, ;     thyroid:  no enlargement/   Back:     Symmetric, no curvature, ROM normal   Lungs:   No audible wheezing or labored breathing   Chest Wall:    No tenderness or deformity    Heart:    Regular rate and rhythm                         Pulses:   2+ and symmetric all extremities   Skin:   Skin color, texture, turgor normal, no rashes or lesions   Neurologic:   normal strength, sensation and reflexes     throughout                       Ortho Exam  Left hip   Skin intact  No erythema   No TTP greater troch bursa   Negative Stinchfield  Patient has pain with flexion and internal rotation  Internal rotation 25 to 30 degrees associated with pain  Neurovascularly Intact Distally     Diagnostics, reviewed and taken today if performed as documented    The attending physician has personally reviewed the pertinent films in PACS and interpretation is as follows: x-ray left hip  Demonstrates cystic changes, subchondral sclerosis, joint space narrowing , moderate osteoarthritis        Procedures, if performed today:    Procedures    None performed      Scribe Attestation    I,:  Kacey Weber am acting as a scribe while in the presence of the attending physician.:       I,:  Filemon Shi MD personally performed the services described in this documentation    as scribed in my presence.:             Portions of the record may have been created with voice recognition software. Occasional wrong word or "sound a like" substitutions may have occurred due to the inherent limitations of voice recognition software. Read the chart carefully and recognize, using context, where substitutions have occurred.

## 2023-09-15 ENCOUNTER — HOSPITAL ENCOUNTER (OUTPATIENT)
Dept: RADIOLOGY | Facility: HOSPITAL | Age: 68
Discharge: HOME | End: 2023-09-15
Attending: ORTHOPAEDIC SURGERY
Payer: COMMERCIAL

## 2023-09-15 DIAGNOSIS — M25.552 LEFT HIP PAIN: ICD-10-CM

## 2023-09-15 DIAGNOSIS — M16.12 PRIMARY OSTEOARTHRITIS OF ONE HIP, LEFT: ICD-10-CM

## 2023-09-15 PROCEDURE — 77002 NEEDLE LOCALIZATION BY XRAY: CPT

## 2023-09-15 PROCEDURE — 20610 DRAIN/INJ JOINT/BURSA W/O US: CPT

## 2023-09-15 RX ORDER — METHYLPREDNISOLONE ACETATE 80 MG/ML
80 INJECTION, SUSPENSION INTRA-ARTICULAR; INTRALESIONAL; INTRAMUSCULAR; SOFT TISSUE
Status: COMPLETED | OUTPATIENT
Start: 2023-09-15 | End: 2023-09-15

## 2023-09-15 RX ORDER — ROPIVACAINE HYDROCHLORIDE 10 MG/ML
0.2 INJECTION EPIDURAL; INFILTRATION; PERINEURAL ONCE
Status: DISCONTINUED | OUTPATIENT
Start: 2023-09-15 | End: 2023-09-15

## 2023-09-15 RX ORDER — LIDOCAINE HYDROCHLORIDE 10 MG/ML
5 INJECTION, SOLUTION EPIDURAL; INFILTRATION; INTRACAUDAL; PERINEURAL
Status: COMPLETED | OUTPATIENT
Start: 2023-09-15 | End: 2023-09-15

## 2023-09-15 RX ORDER — ROPIVACAINE HYDROCHLORIDE 10 MG/ML
2 INJECTION EPIDURAL; INFILTRATION; PERINEURAL ONCE
Status: DISCONTINUED | OUTPATIENT
Start: 2023-09-15 | End: 2023-09-15

## 2023-09-15 RX ORDER — ROPIVACAINE HYDROCHLORIDE 2 MG/ML
5 INJECTION, SOLUTION EPIDURAL; INFILTRATION; PERINEURAL ONCE
Status: DISCONTINUED | OUTPATIENT
Start: 2023-09-15 | End: 2023-09-16 | Stop reason: HOSPADM

## 2023-09-15 RX ADMIN — LIDOCAINE HYDROCHLORIDE 3 ML: 10 INJECTION, SOLUTION EPIDURAL; INFILTRATION; INTRACAUDAL; PERINEURAL at 15:10

## 2023-09-15 RX ADMIN — ROPIVACAINE HYDROCHLORIDE 0.2 MG: 10 INJECTION, SOLUTION EPIDURAL at 15:10

## 2023-09-15 RX ADMIN — METHYLPREDNISOLONE ACETATE 80 MG: 80 INJECTION, SUSPENSION INTRA-ARTICULAR; INTRALESIONAL; INTRAMUSCULAR; SOFT TISSUE at 15:10

## 2023-09-15 RX ADMIN — IOHEXOL 1 ML: 300 INJECTION, SOLUTION INTRAVENOUS at 15:10

## 2023-09-15 NOTE — PROGRESS NOTES
Ariel Pritchard is to present to [facility] today to undergo a fluoroscopy guided left hip injection. I contacted Dr. Ronal Tidwell for script clarification via tiger text at 13:26. Per Dr. Ronal Tidwell, we are allowed to use Lidocaine for subcutenous and soft tissue local anesthesia, however, we are to avoid using Lidocaine in the intra-articular injection to avoid any chondral injury. Dr. Ronal Tidwell approved using 3ml of Ropivicaine 0.2 % as part of the intra-articular steroid and anesthetic injection.      [facility]

## 2023-09-21 ENCOUNTER — HOSPITAL ENCOUNTER (OUTPATIENT)
Dept: MAMMOGRAPHY | Facility: HOSPITAL | Age: 68
Discharge: HOME | End: 2023-09-21
Attending: OBSTETRICS & GYNECOLOGY
Payer: COMMERCIAL

## 2023-09-21 VITALS — BODY MASS INDEX: 36 KG/M2 | HEIGHT: 66 IN | WEIGHT: 224 LBS

## 2023-09-21 DIAGNOSIS — Z12.31 SCREENING MAMMOGRAM FOR BREAST CANCER: ICD-10-CM

## 2023-09-21 PROCEDURE — 77067 SCR MAMMO BI INCL CAD: CPT

## 2023-09-21 PROCEDURE — 77063 BREAST TOMOSYNTHESIS BI: CPT

## 2023-12-08 ENCOUNTER — OFFICE VISIT (OUTPATIENT)
Dept: OBGYN CLINIC | Facility: CLINIC | Age: 68
End: 2023-12-08
Payer: COMMERCIAL

## 2023-12-08 VITALS
BODY MASS INDEX: 36 KG/M2 | DIASTOLIC BLOOD PRESSURE: 77 MMHG | SYSTOLIC BLOOD PRESSURE: 119 MMHG | HEIGHT: 66 IN | WEIGHT: 224 LBS | HEART RATE: 66 BPM

## 2023-12-08 DIAGNOSIS — M16.12 PRIMARY OSTEOARTHRITIS OF ONE HIP, LEFT: Primary | ICD-10-CM

## 2023-12-08 PROCEDURE — 99213 OFFICE O/P EST LOW 20 MIN: CPT | Performed by: ORTHOPAEDIC SURGERY

## 2023-12-08 NOTE — PROGRESS NOTES
Assessment:  1. Primary osteoarthritis of one hip, left  FL injection left hip (non arthrogram)          Plan:    Patient with left hip pain due to osteoarthritis   Weightbearing as tolerated   Will be ordering Left hip IA steroid injection / she will call to schedule the injection when ready. Follow PRN        The above stated was discussed in layman's terms and the patient expressed understanding. All questions were answered to the patient's satisfaction. Subjective:   Fabiola Landeros is a 80 y.o. female who presents today follow-up for left hip pain due to osteoarthritis. Patient had a left hip steroid injection on 9/15/23 and had relief until 2 weeks ago when she flared up her hip. She notes pain over anterolateral left hip. She occasionally takes Aleve for pain relief.        Review of systems negative unless otherwise specified in HPI    Past Medical History:   Diagnosis Date   • Anxiety    • Closed fracture of distal radius and ulna, left, initial encounter     Last Assessed:  5/13/16   • CPAP (continuous positive airway pressure) dependence    • Depression    • Disease of thyroid gland    • Fibromyalgia    • GERD (gastroesophageal reflux disease)    • Hyperlipidemia    • Sleep apnea     pt uses c-pap   • Sleep apnea, obstructive    • Squamous cell carcinoma of skin     Last Assessed:  1/17/17       Past Surgical History:   Procedure Laterality Date   • BREAST EXCISIONAL BIOPSY  1988    doesnt remember which breast and cant see a scar   • COLON SURGERY     • COLONOSCOPY N/A 07/14/2016    Procedure: COLONOSCOPY WITH ENDOCLIPS AND INJECTION FOR HEMOSTASIS;  Surgeon: Dinesh Wayne MD;  [facility];  Service:    • COLONOSCOPY  08/2019   • FL INJECTION LEFT HIP (NON ARTHROGRAM)  09/15/2023   • MOHS RECONSTRUCTION     • NASAL FRACTURE SURGERY     • [place] OPTX DSTL RADL I-ARTIC FX/EPIPHYSL SEP 3 FRAG Left 02/25/2016    Procedure: OPEN REDUCTION W/ INTERNAL FIXATION (ORIF)DISTAL  RADIUS ; Surgeon: Faustino Mejia MD;  Location: [facility];  Service: Orthopedics       Family History   Problem Relation Age of Onset   • Cancer Mother         Liver cancer   • Breast cancer Mother 54   • Cancer Father         Colon   • Colon cancer Father    • Cancer Sister    • No Known Problems Daughter    • No Known Problems Maternal Grandmother    • No Known Problems Maternal Grandfather    • No Known Problems Paternal Grandmother    • No Known Problems Paternal Grandfather    • No Known Problems Maternal Aunt    • No Known Problems Paternal Aunt    • No Known Problems Paternal Aunt    • No Known Problems Paternal Aunt    • No Known Problems Paternal Aunt    • No Known Problems Paternal Aunt        Social History     Occupational History   • Not on file   Tobacco Use   • Smoking status: Former     Packs/day: 1.00     Years: 30.00     Total pack years: 30.00     Types: Cigarettes     Start date: 1972     Quit date: 2002     Years since quittin.9     Passive exposure: Past   • Smokeless tobacco: Never   Vaping Use   • Vaping Use: Never used   Substance and Sexual Activity   • Alcohol use: Not Currently   • Drug use: No   • Sexual activity: Not Currently     Partners: Male     Birth control/protection: Male Sterilization         Current Outpatient Medications:   •  buPROPion (WELLBUTRIN XL) 300 mg 24 hr tablet, Take 1 tablet (300 mg total) by mouth daily, Disp: 90 tablet, Rfl: 3  •  Calcium Carbonate-Vit D-Min (CALCIUM 1200 PO), Take by mouth daily, Disp: , Rfl:   •  cholecalciferol (VITAMIN D3) 25 mcg (1,000 units) tablet, , Disp: , Rfl:   •  FLUoxetine (PROzac) 20 mg capsule, Take 1 capsule (20 mg total) by mouth daily, Disp: 90 capsule, Rfl: 3  •  levothyroxine 175 mcg tablet, Take 1 tablet (175 mcg total) by mouth daily in the early morning, Disp: 90 tablet, Rfl: 3  •  Magnesium 200 MG TABS, Take 1 tablet by mouth daily, Disp: , Rfl:   •  Multiple Vitamins-Minerals (MULTIVITAMIN ADULT PO), Take 1 tablet by mouth daily, Disp: , Rfl:   •  Omega-3 Fatty Acids (FISH OIL) 1000 MG CPDR, Take 1 capsule by mouth daily, Disp: , Rfl:   •  pravastatin (PRAVACHOL) 20 mg tablet, Take 1 tablet (20 mg total) by mouth daily, Disp: 90 tablet, Rfl: 3  •  vitamin E 100 UNIT capsule, Take 1 capsule by mouth daily, Disp: , Rfl:   •  methylPREDNISolone 4 MG tablet therapy pack, Use as directed on package (Patient not taking: Reported on 2/28/2023), Disp: 21 each, Rfl: 0    Current Facility-Administered Medications:   •  EPINEPHrine PF (ADRENALIN) 1 mg/mL injection 0.5 mg, 0.5 mg, Intramuscular, Once, Marco Antonio Alston MD    Allergies   Allergen Reactions   • Erythromycin Shortness Of Breath     Reaction Date: [id number];    • Cefprozil      Reaction Date: [id number];    • Cephalosporins             Vitals:    12/08/23 1112   BP: 119/77   Pulse: 66     Body mass index is 36.15 kg/m².   Wt Readings from Last 3 Encounters:   12/08/23 102 kg (224 lb)   09/21/23 102 kg (224 lb)   09/08/23 102 kg (224 lb)       Objective:            Physical Exam  Physical Exam:      General Appearance:    Alert, cooperative, no distress, appears stated age   Head:    Normocephalic, without obvious abnormality, atraumatic   Eyes:    conjunctiva/corneas clear, both eyes         Nose:   Nares normal, septum midline, no drainage    Throat:   Lips normal; teeth and gums normal   Neck:    symmetrical, trachea midline, ;     thyroid:  no enlargement/   Back:     Symmetric, no curvature, ROM normal   Lungs:   No audible wheezing or labored breathing   Chest Wall:    No tenderness or deformity    Heart:    Regular rate and rhythm                         Pulses:   2+ and symmetric all extremities   Skin:   Skin color, texture, turgor normal, no rashes or lesions   Neurologic:   normal strength, sensation and reflexes     throughout                       Ortho Exam  Left hip   Skin intact  No erythema   No TTP greater troch bursa   Negative Stinchfield  Patient has pain with flexion and  internal rotation  Internal rotation 25 to 30 degrees associated with pain  Neurovascularly Intact Distally        Diagnostics, reviewed and taken today if performed as documented:          Procedures, if performed today:    Procedures    None performed      Scribe Attestation      I,:  Froy Arevalo am acting as a scribe while in the presence of the attending physician.:       I,:  Kala Smith MD personally performed the services described in this documentation    as scribed in my presence.:               Portions of the record may have been created with voice recognition software. Occasional wrong word or "sound a like" substitutions may have occurred due to the inherent limitations of voice recognition software. Read the chart carefully and recognize, using context, where substitutions have occurred.

## 2023-12-21 ENCOUNTER — TELEPHONE (OUTPATIENT)
Age: 68
End: 2023-12-21

## 2023-12-21 NOTE — TELEPHONE ENCOUNTER
Caller: Patient    Doctor: Juancarlos    Reason for call: Would like to schedule US guided Injection  Please advise     Call back#: [phone]

## 2023-12-22 ENCOUNTER — TELEPHONE (OUTPATIENT)
Dept: PULMONOLOGY | Facility: CLINIC | Age: 68
End: 2023-12-22

## 2024-01-03 ENCOUNTER — HOSPITAL ENCOUNTER (OUTPATIENT)
Dept: RADIOLOGY | Facility: HOSPITAL | Age: 69
Discharge: HOME | End: 2024-01-03
Attending: ORTHOPAEDIC SURGERY
Payer: COMMERCIAL

## 2024-01-03 DIAGNOSIS — M16.12 PRIMARY OSTEOARTHRITIS OF ONE HIP, LEFT: ICD-10-CM

## 2024-01-03 PROCEDURE — 77002 NEEDLE LOCALIZATION BY XRAY: CPT

## 2024-01-03 PROCEDURE — 20610 DRAIN/INJ JOINT/BURSA W/O US: CPT

## 2024-01-03 RX ORDER — LIDOCAINE HYDROCHLORIDE 10 MG/ML
5 INJECTION, SOLUTION EPIDURAL; INFILTRATION; INTRACAUDAL; PERINEURAL
Status: COMPLETED | OUTPATIENT
Start: 2024-01-03 | End: 2024-01-03

## 2024-01-03 RX ORDER — METHYLPREDNISOLONE ACETATE 80 MG/ML
80 INJECTION, SUSPENSION INTRA-ARTICULAR; INTRALESIONAL; INTRAMUSCULAR; SOFT TISSUE
Status: COMPLETED | OUTPATIENT
Start: 2024-01-03 | End: 2024-01-03

## 2024-01-03 RX ORDER — BUPIVACAINE HYDROCHLORIDE 2.5 MG/ML
10 INJECTION, SOLUTION EPIDURAL; INFILTRATION; INTRACAUDAL
Status: COMPLETED | OUTPATIENT
Start: 2024-01-03 | End: 2024-01-03

## 2024-01-03 RX ADMIN — BUPIVACAINE HYDROCHLORIDE 3 ML: 2.5 INJECTION, SOLUTION EPIDURAL; INFILTRATION; INTRACAUDAL; PERINEURAL at 15:20

## 2024-01-03 RX ADMIN — METHYLPREDNISOLONE ACETATE 80 MG: 80 INJECTION, SUSPENSION INTRA-ARTICULAR; INTRALESIONAL; INTRAMUSCULAR; SOFT TISSUE at 15:20

## 2024-01-03 RX ADMIN — IOHEXOL 1 ML: 300 INJECTION, SOLUTION INTRAVENOUS at 15:20

## 2024-01-03 RX ADMIN — LIDOCAINE HYDROCHLORIDE 4 ML: 10 INJECTION, SOLUTION EPIDURAL; INFILTRATION; INTRACAUDAL; PERINEURAL at 15:20

## 2024-01-26 ENCOUNTER — OFFICE VISIT (OUTPATIENT)
Dept: OBGYN CLINIC | Facility: CLINIC | Age: 69
End: 2024-01-26
Payer: COMMERCIAL

## 2024-01-26 VITALS — HEART RATE: 71 BPM | DIASTOLIC BLOOD PRESSURE: 72 MMHG | SYSTOLIC BLOOD PRESSURE: 110 MMHG

## 2024-01-26 DIAGNOSIS — M70.62 GREATER TROCHANTERIC BURSITIS OF LEFT HIP: Primary | ICD-10-CM

## 2024-01-26 PROCEDURE — 99213 OFFICE O/P EST LOW 20 MIN: CPT | Performed by: ORTHOPAEDIC SURGERY

## 2024-01-26 PROCEDURE — 20610 DRAIN/INJ JOINT/BURSA W/O US: CPT | Performed by: ORTHOPAEDIC SURGERY

## 2024-01-26 RX ORDER — METHYLPREDNISOLONE ACETATE 40 MG/ML
2 INJECTION, SUSPENSION INTRA-ARTICULAR; INTRALESIONAL; INTRAMUSCULAR; SOFT TISSUE
Status: COMPLETED | OUTPATIENT
Start: 2024-01-26 | End: 2024-01-26

## 2024-01-26 RX ORDER — KETOROLAC TROMETHAMINE 30 MG/ML
30 INJECTION, SOLUTION INTRAMUSCULAR; INTRAVENOUS
Status: COMPLETED | OUTPATIENT
Start: 2024-01-26 | End: 2024-01-26

## 2024-01-26 RX ORDER — BUPIVACAINE HYDROCHLORIDE 2.5 MG/ML
2 INJECTION, SOLUTION INFILTRATION; PERINEURAL
Status: COMPLETED | OUTPATIENT
Start: 2024-01-26 | End: 2024-01-26

## 2024-01-26 RX ADMIN — BUPIVACAINE HYDROCHLORIDE 2 ML: 2.5 INJECTION, SOLUTION INFILTRATION; PERINEURAL at 11:45

## 2024-01-26 RX ADMIN — METHYLPREDNISOLONE ACETATE 2 ML: 40 INJECTION, SUSPENSION INTRA-ARTICULAR; INTRALESIONAL; INTRAMUSCULAR; SOFT TISSUE at 11:45

## 2024-01-26 RX ADMIN — KETOROLAC TROMETHAMINE 30 MG: 30 INJECTION, SOLUTION INTRAMUSCULAR; INTRAVENOUS at 11:45

## 2024-01-26 NOTE — PROGRESS NOTES
Orthopedics          Lili M Mortimer 68 y.o. female MRN: [id number]      Chief Complaint:   left laterally based hip pain    HPI:   68 y.o.female complaining of left laterally based hip pain.  Patient has been treated for left hip pain due to arthritis.  Her intra-articular injections last approximately 3 months time.  She states her left groin region is significantly improved..  She does complain of pain in the lateral aspect of her hip.  She states pain is worsened by laying on her left side and standing for long periods of time.  She denies any radiation of pain past her left knee denies any decreased motion.                Review Of Systems:   Skin: Normal  Neuro: See HPI  Musculoskeletal: See HPI  All other systems reviewed and are negative    Past Medical History:   Past Medical History:   Diagnosis Date    Anxiety     Closed fracture of distal radius and ulna, left, initial encounter     Last Assessed:  5/13/16    CPAP (continuous positive airway pressure) dependence     Depression     Disease of thyroid gland     Fibromyalgia     GERD (gastroesophageal reflux disease)     Hyperlipidemia     Sleep apnea     pt uses c-pap    Sleep apnea, obstructive     Squamous cell carcinoma of skin     Last Assessed:  1/17/17       Past Surgical History:   Past Surgical History:   Procedure Laterality Date    BREAST EXCISIONAL BIOPSY  1988    doesnt remember which breast and cant see a scar    COLON SURGERY      COLONOSCOPY N/A 07/14/2016    Procedure: COLONOSCOPY WITH ENDOCLIPS AND INJECTION FOR HEMOSTASIS;  Surgeon: Boyd Holt MD;  Location: [facility];  Service:     COLONOSCOPY  08/2019    FL INJECTION LEFT HIP (NON ARTHROGRAM)  09/15/2023    FL INJECTION LEFT HIP (NON ARTHROGRAM)  1/3/2024    MOHS RECONSTRUCTION      NASAL FRACTURE SURGERY      DE OPTX DSTL RADL I-ARTIC FX/EPIPHYSL SEP 3 FRAG Left 02/25/2016    Procedure: OPEN REDUCTION W/ INTERNAL FIXATION (ORIF)DISTAL  RADIUS ;  Surgeon: Boyd Wagner MD;   Location: [facility] OR;  Service: Orthopedics       Family History:  Family history reviewed and non-contributory  Family History   Problem Relation Age of Onset    Cancer Mother         Liver cancer    Breast cancer Mother 55    Cancer Father         Colon    Colon cancer Father     Cancer Sister     No Known Problems Daughter     No Known Problems Maternal Grandmother     No Known Problems Maternal Grandfather     No Known Problems Paternal Grandmother     No Known Problems Paternal Grandfather     No Known Problems Maternal Aunt     No Known Problems Paternal Aunt     No Known Problems Paternal Aunt     No Known Problems Paternal Aunt     No Known Problems Paternal Aunt     No Known Problems Paternal Aunt          Social History:  Social History     Socioeconomic History    Marital status: /Civil Union     Spouse name: None    Number of children: None    Years of education: None    Highest education level: None   Occupational History    None   Tobacco Use    Smoking status: Former     Current packs/day: 0.00     Average packs/day: 1 pack/day for 30.0 years (30.0 ttl pk-yrs)     Types: Cigarettes     Start date: 1972     Quit date: 2002     Years since quittin.0     Passive exposure: Past    Smokeless tobacco: Never   Vaping Use    Vaping status: Never Used   Substance and Sexual Activity    Alcohol use: Not Currently    Drug use: No    Sexual activity: Not Currently     Partners: Male     Birth control/protection: Male Sterilization   Other Topics Concern    None   Social History Narrative    2 cups of coffee daily     Social Determinants of Health     Financial Resource Strain: Low Risk  (2023)    Overall Financial Resource Strain (CARDIA)     Difficulty of Paying Living Expenses: Not hard at all   Food Insecurity: Not on file   Transportation Needs: No Transportation Needs (2023)    PRAPARE - Transportation     Lack of Transportation (Medical): No     Lack of Transportation  (Non-Medical): No   Physical Activity: Not on file   Stress: Not on file   Social Connections: Not on file   Intimate Partner Violence: Not on file   Housing Stability: Not on file       Allergies:   Allergies   Allergen Reactions    Erythromycin Shortness Of Breath     Reaction Date: 20Oct2011;     Cefprozil      Reaction Date: 20Oct2011;     Cephalosporins        Labs:  0   Lab Value Date/Time    HCT 40.8 02/14/2023 0855    HCT 39.9 03/28/2022 0746    HCT 39.3 02/23/2021 0913    HCT 42.4 02/16/2017 0919    HCT 39.1 01/20/2017 0802    HCT 28.1 (L) 07/15/2016 0504    HCT 29.1 (L) 07/15/2016 0202    HCT 27 (L) 07/14/2016 2208    HGB 13.9 02/14/2023 0855    HGB 13.2 03/28/2022 0746    HGB 13.2 02/23/2021 0913    HGB 13.6 02/16/2017 0919    HGB 13.0 01/20/2017 0802    HGB 9.4 (L) 07/15/2016 0504    HGB 9.7 (L) 07/15/2016 0202    HGB 9.2 (L) 07/14/2016 2208    WBC 6.9 02/14/2023 0855    WBC 7.8 03/28/2022 0746    WBC 5.7 02/23/2021 0913    WBC 8.65 02/16/2017 0919    WBC 6.3 01/20/2017 0802    WBC 7.37 07/15/2016 0504       Meds:    Current Outpatient Medications:     buPROPion (WELLBUTRIN XL) 300 mg 24 hr tablet, Take 1 tablet (300 mg total) by mouth daily, Disp: 90 tablet, Rfl: 3    Calcium Carbonate-Vit D-Min (CALCIUM 1200 PO), Take by mouth daily, Disp: , Rfl:     cholecalciferol (VITAMIN D3) 25 mcg (1,000 units) tablet, , Disp: , Rfl:     FLUoxetine (PROzac) 20 mg capsule, Take 1 capsule (20 mg total) by mouth daily, Disp: 90 capsule, Rfl: 3    levothyroxine 175 mcg tablet, Take 1 tablet (175 mcg total) by mouth daily in the early morning, Disp: 90 tablet, Rfl: 3    Magnesium 200 MG TABS, Take 1 tablet by mouth daily, Disp: , Rfl:     Multiple Vitamins-Minerals (MULTIVITAMIN ADULT PO), Take 1 tablet by mouth daily, Disp: , Rfl:     Omega-3 Fatty Acids (FISH OIL) 1000 MG CPDR, Take 1 capsule by mouth daily, Disp: , Rfl:     pravastatin (PRAVACHOL) 20 mg tablet, Take 1 tablet (20 mg total) by mouth daily, Disp: 90  tablet, Rfl: 3    vitamin E 100 UNIT capsule, Take 1 capsule by mouth daily, Disp: , Rfl:     methylPREDNISolone 4 MG tablet therapy pack, Use as directed on package (Patient not taking: Reported on 2/28/2023), Disp: 21 each, Rfl: 0    Current Facility-Administered Medications:     EPINEPHrine PF (ADRENALIN) 1 mg/mL injection 0.5 mg, 0.5 mg, Intramuscular, Once, Aida Urias MD    There is no height or weight on file to calculate BMI.  Wt Readings from Last 3 Encounters:   12/08/23 102 kg (224 lb)   09/21/23 102 kg (224 lb)   09/08/23 102 kg (224 lb)     Physical Exam:   Vitals:    01/26/24 1215   BP: 110/72   Pulse: 71       General Appearance:    Alert, cooperative, no distress, appears stated age   Head:    Normocephalic, without obvious abnormality, atraumatic   Eyes:    conjunctiva/corneas clear, both eyes        Nose:   Nares normal, septum midline, no drainage    Throat:   Lips normal; teeth and gums normal   Neck:    symmetrical, trachea midline, ;     thyroid:  no enlargement/   Back:     Symmetric, no curvature, ROM normal   Lungs:   No audible wheezing or labored breathing   Chest Wall:    No tenderness or deformity    Heart:    Regular rate and rhythm               Pulses:   2+ and symmetric all extremities   Skin:   Skin color, texture, turgor normal, no rashes or lesions   Neurologic:   normal strength, sensation and reflexes     throughout       Musculoskeletal: left lower extremity  Examination patient's left hip no effusion no erythema active flexion 90 degrees no pain with internal ex rotation to 10 degrees with hip flexed at 90 degrees pain to palpation of the greater trochanteric region positive Shwetha's test negative modified straight leg raise test hip flexion abduction abduction strength 5 out of 5 sensation intact as well as present    Large joint arthrocentesis: L greater trochanteric bursa  Universal Protocol:  Consent: Verbal consent obtained.  Consent given by: patient  Patient  understanding: patient states understanding of the procedure being performed  Site marked: the operative site was marked  Patient identity confirmed: verbally with patient  Supporting Documentation  Indications: pain   Procedure Details  Location: hip - L greater trochanteric bursa  Needle size: 22 G  Approach: lateral  Medications administered: 2 mL bupivacaine 0.25 %; 2 mL methylPREDNISolone acetate 40 mg/mL; 30 mg ketorolac 30 mg/mL    Patient tolerance: patient tolerated the procedure well with no immediate complications         _*_*_*_*_*_*_*_*_*_*_*_*_*_*_*_*_*_*_*_*_*_*_*_*_*_*_*_*_*_*_*_*_*_*_*_*_*_*_*_*_*    Assessment:  68 y.o.female with left hip greater trochanter bursitis    Plan:   Weight bearing as tolerated  left lower extremity  Left Hip greater trochanteric corticosteroid Toradol injection administered as noted above  Patient advised should they develop any increasing pain, redness, drainage, numbness, tingling or swelling surrounding the injection site, they are to contact our office or present to the emergency department.  Patient advised home range of motion stretching strengthening exercises  Follow up in 3 months or sooner if needed      Irving Moreno PA-C

## 2024-02-06 ENCOUNTER — OFFICE VISIT (OUTPATIENT)
Dept: PULMONOLOGY | Facility: CLINIC | Age: 69
End: 2024-02-06
Payer: COMMERCIAL

## 2024-02-06 VITALS
DIASTOLIC BLOOD PRESSURE: 70 MMHG | TEMPERATURE: 98 F | WEIGHT: 224 LBS | OXYGEN SATURATION: 96 % | BODY MASS INDEX: 36 KG/M2 | SYSTOLIC BLOOD PRESSURE: 116 MMHG | HEIGHT: 66 IN | HEART RATE: 70 BPM

## 2024-02-06 DIAGNOSIS — G47.33 OSA ON CPAP: Primary | ICD-10-CM

## 2024-02-06 DIAGNOSIS — E66.01 OBESITY, MORBID (HCC): ICD-10-CM

## 2024-02-06 PROCEDURE — 99213 OFFICE O/P EST LOW 20 MIN: CPT | Performed by: INTERNAL MEDICINE

## 2024-02-06 NOTE — PROGRESS NOTES
Sleep Follow Up Note   Lili M Mortimer 68 y.o. female MRN: [id number]  2/6/2024      Assessment and Plan:    1. DIONE on CPAP  Severe DIONE diagnosed remotely reestablished recently in 2021 AHI of 33.6 events per hour  Could not obtain compliance data today, her DME company is [email or web address]  She uses Emelia machine fixed pressure of 10 no changes made as far as she is aware  No excessive daytime sleepiness or changes in her sleep routine  DME supplies prescription placed her last compliance from 2022 reflecting 100% using the machine averaging 9 hours with minimal residual AHI  Follow-up in 1 year  - PAP DME Resupply/Reorder    2. Obesity, morbid (HCC)  BMI of 36.15        Return in about 1 year (around 2/6/2025).    History of Present Illness   HPI:  Lili M Mortimer is a 68 y.o. female who is here for her annual visit she has history as detailed below of hypothyroidism depression and obstructive sleep apnea diagnosed remotely underwent a home sleep study in 2021 to reestablish her diagnosis restarted on CPAP got a new Emelia machine through the recall duration as she had a Shaila machine.  She denies sleepiness as long as she is sleeping her CPAP averaging 9 hours of sleep.  Denies shortness of breath, chest pain or cough.        Historical Information   Past Medical History:   Diagnosis Date    Anxiety     Closed fracture of distal radius and ulna, left, initial encounter     Last Assessed:  5/13/16    CPAP (continuous positive airway pressure) dependence     Depression     Disease of thyroid gland     Fibromyalgia     GERD (gastroesophageal reflux disease)     Hyperlipidemia     Sleep apnea     pt uses c-pap    Sleep apnea, obstructive     Squamous cell carcinoma of skin     Last Assessed:  1/17/17     Past Surgical History:   Procedure Laterality Date    BREAST EXCISIONAL BIOPSY  1988    doesnt remember which breast and cant see a scar    COLON SURGERY      COLONOSCOPY N/A 07/14/2016    Procedure: COLONOSCOPY WITH ENDOCLIPS  AND INJECTION FOR HEMOSTASIS;  Surgeon: Boyd Holt MD;  Location: [facility];  Service:     COLONOSCOPY  08/2019    FL INJECTION LEFT HIP (NON ARTHROGRAM)  09/15/2023    FL INJECTION LEFT HIP (NON ARTHROGRAM)  1/3/2024    MOHS RECONSTRUCTION      NASAL FRACTURE SURGERY      [place] OPTX DSTL RADL I-ARTIC FX/EPIPHYSL SEP 3 FRAG Left 02/25/2016    Procedure: OPEN REDUCTION W/ INTERNAL FIXATION (ORIF)DISTAL  RADIUS ;  Surgeon: Boyd Wagner MD;  Location: [facility];  Service: Orthopedics     Family History   Problem Relation Age of Onset    Cancer Mother         Liver cancer    Breast cancer Mother 55    Cancer Father         Colon    Colon cancer Father     Cancer Sister     No Known Problems Daughter     No Known Problems Maternal Grandmother     No Known Problems Maternal Grandfather     No Known Problems Paternal Grandmother     No Known Problems Paternal Grandfather     No Known Problems Maternal Aunt     No Known Problems Paternal Aunt     No Known Problems Paternal Aunt     No Known Problems Paternal Aunt     No Known Problems Paternal Aunt     No Known Problems Paternal Aunt          Meds/Allergies     Current Outpatient Medications:     buPROPion (WELLBUTRIN XL) 300 mg 24 hr tablet, Take 1 tablet (300 mg total) by mouth daily, Disp: 90 tablet, Rfl: 3    Calcium Carbonate-Vit D-Min (CALCIUM 1200 PO), Take by mouth daily, Disp: , Rfl:     cholecalciferol (VITAMIN D3) 25 mcg (1,000 units) tablet, , Disp: , Rfl:     FLUoxetine (PROzac) 20 mg capsule, Take 1 capsule (20 mg total) by mouth daily, Disp: 90 capsule, Rfl: 3    levothyroxine 175 mcg tablet, Take 1 tablet (175 mcg total) by mouth daily in the early morning, Disp: 90 tablet, Rfl: 3    Magnesium 200 MG TABS, Take 1 tablet by mouth daily, Disp: , Rfl:     Multiple Vitamins-Minerals (MULTIVITAMIN ADULT PO), Take 1 tablet by mouth daily, Disp: , Rfl:     Omega-3 Fatty Acids (FISH OIL) 1000 MG CPDR, Take 1 capsule by mouth daily, Disp: , Rfl:      "pravastatin (PRAVACHOL) 20 mg tablet, Take 1 tablet (20 mg total) by mouth daily, Disp: 90 tablet, Rfl: 3    vitamin E 100 UNIT capsule, Take 1 capsule by mouth daily, Disp: , Rfl:     methylPREDNISolone 4 MG tablet therapy pack, Use as directed on package (Patient not taking: Reported on 2/28/2023), Disp: 21 each, Rfl: 0    Current Facility-Administered Medications:     EPINEPHrine PF (ADRENALIN) 1 mg/mL injection 0.5 mg, 0.5 mg, Intramuscular, Once, Aida Urias MD  Allergies   Allergen Reactions    Erythromycin Shortness Of Breath     Reaction Date: 20Oct2011;     Cefprozil      Reaction Date: 20Oct2011;     Cephalosporins        Vitals: Blood pressure 116/70, pulse 70, temperature 98 °F (36.7 °C), temperature source Tympanic, height 5' 6\" (1.676 m), weight 102 kg (224 lb), SpO2 96%. Body mass index is 36.15 kg/m². Oxygen Therapy  SpO2: 96 %  Oxygen Therapy: None (Room air)      Physical Exam  General:  Awake alert and oriented x 3, conversant without conversational dyspnea, NAD, normal affect  HEENT:   Sclera noninjected, nonicteric OU, Nares patent,  no craniofacial abnormalities  NECK:  Trachea midline, no accessory muscle use, no stridor,  JVP not elevated  CARDIAC: Reg, single s1/S2, no m/r/g  PULM: CTA bilaterally no wheezing, rhonchi or rales  EXT: No cyanosis, no clubbing, no edema  NEURO: no focal neurologic deficits, AAOx3, moving all extremities appropriately    Labs: I have personally reviewed pertinent lab results., ABG: No results found for: \"PHART\", \"WUL1RFY\", \"PO2ART\", \"ALX6YUO\", \"Q8OZUXVQ\", \"BEART\", \"SOURCE\", BNP: No results found for: \"BNP\", CBC: No results found for: \"WBC\", \"HGB\", \"HCT\", \"MCV\", \"PLT\", \"ADJUSTEDWBC\", \"RBC\", \"MCH\", \"MCHC\", \"RDW\", \"MPV\", \"NRBC\", CMP: No results found for: \"SODIUM\", \"K\", \"CL\", \"CO2\", \"ANIONGAP\", \"BUN\", \"CREATININE\", \"GLUCOSE\", \"CALCIUM\", \"AST\", \"ALT\", \"ALKPHOS\", \"PROT\", \"BILITOT\", \"EGFR\", PT/INR: No results found for: \"PT\", \"INR\", Troponin: No results found " "for: \"TROPONINI\"  Lab Results   Component Value Date    WBC 6.9 02/14/2023    HGB 13.9 02/14/2023    HCT 40.8 02/14/2023    MCV 92 02/14/2023     02/14/2023     Lab Results   Component Value Date    GLUCOSE 111 (H) 01/20/2017    CALCIUM 9.3 06/20/2020     01/20/2017    K 4.4 02/14/2023    CO2 22 02/14/2023     02/14/2023    BUN 13 02/14/2023    CREATININE 0.63 02/14/2023     No results found for: \"IGE\"  Lab Results   Component Value Date    ALT 32 02/14/2023    AST 25 02/14/2023    ALKPHOS 80 06/20/2020    BILITOT 0.5 01/20/2017             Sleep studies: I have personally reviewed pertinent reports.    Home sleep study on 9/7/2021 which showed an LISA of 33.6 with oxygen merlin of 84%.           Tl Garcia MD  [facility]       Portions of the record may have been created with voice recognition software. Occasional wrong word or \"sound a like\" substitutions may have occurred due to the inherent limitations of voice recognition software. Read the chart carefully and recognize, using context, where substitutions have occurred.  "

## 2024-02-06 NOTE — PATIENT INSTRUCTIONS
CPAP   AMBULATORY CARE:   CPAP (continuous positive airway pressure)  is a treatment that uses air pressure to keep your airways open while you sleep. CPAP is used to treat obstructive sleep apnea (DIONE). A CPAP machine connects the mask to the machine with a hose. Air pressure prevents your airway from collapsing or becoming blocked during sleep. Use your CPAP machine when you sleep, even when you nap. You may need to use a CPAP machine for the rest of your life.       Make CPAP easier to use:   At first, try to use your CPAP for a few hours every night.  Then slowly increase the length of time you use your machine. It takes time to adjust to CPAP treatment.    You may need to use a special moisturizer made for CPAP users.  You may need a mask that is a different size, shape, or material. Talk to your healthcare provider if your mask feels uncomfortable or irritates your skin.    Use a saline nasal spray at bedtime to help relieve nasal irritation.  A chin strap to help keep your mouth closed. A different type of mask can help dry mouth. Some machines come with a heated humidifier to help relieve these symptoms.    Talk to your healthcare provider if you are having problems adjusting to the air pressure.  He or she can tell you how to adjust the air pressure on your CPAP. You may need to start at a lower pressure and slowly increase it over time.    Call your doctor if:   You continue to feel very sleepy during the day, even after you wear your CPAP device as directed.    Your CPAP is causing a problem, such as a rash, that does not improve.    You have questions or concerns about your condition, care, or equipment.    Follow up with your doctor as directed:  Tell your healthcare provider if your mask no longer fits properly. Write down your questions so you remember to ask them during your visits.  © Copyright [employer] 2023 Information is for End User's use only and may not be sold, redistributed or otherwise used  for commercial purposes.  The above information is an  only. It is not intended as medical advice for individual conditions or treatments. Talk to your doctor, nurse or pharmacist before following any medical regimen to see if it is safe and effective for you.

## 2024-02-07 ENCOUNTER — RA CDI HCC (OUTPATIENT)
Dept: OTHER | Facility: HOSPITAL | Age: 69
End: 2024-02-07

## 2024-02-09 LAB
DME PARACHUTE DELIVERY DATE ACTUAL: NORMAL
DME PARACHUTE DELIVERY DATE REQUESTED: NORMAL
DME PARACHUTE ITEM DESCRIPTION: NORMAL
DME PARACHUTE ORDER STATUS: NORMAL
DME PARACHUTE SUPPLIER NAME: NORMAL
DME PARACHUTE SUPPLIER PHONE: NORMAL

## 2024-02-14 ENCOUNTER — OFFICE VISIT (OUTPATIENT)
Dept: FAMILY MEDICINE CLINIC | Facility: CLINIC | Age: 69
End: 2024-02-14
Payer: COMMERCIAL

## 2024-02-14 VITALS
DIASTOLIC BLOOD PRESSURE: 72 MMHG | TEMPERATURE: 97.9 F | HEART RATE: 74 BPM | WEIGHT: 224 LBS | BODY MASS INDEX: 36 KG/M2 | SYSTOLIC BLOOD PRESSURE: 114 MMHG | RESPIRATION RATE: 18 BRPM | OXYGEN SATURATION: 97 % | HEIGHT: 66 IN

## 2024-02-14 DIAGNOSIS — R32 URINARY INCONTINENCE, UNSPECIFIED TYPE: ICD-10-CM

## 2024-02-14 DIAGNOSIS — R73.03 PREDIABETES: ICD-10-CM

## 2024-02-14 DIAGNOSIS — K76.0 FATTY LIVER: ICD-10-CM

## 2024-02-14 DIAGNOSIS — Z00.00 MEDICARE ANNUAL WELLNESS VISIT, SUBSEQUENT: ICD-10-CM

## 2024-02-14 DIAGNOSIS — F10.20 ALCOHOLISM (HCC): ICD-10-CM

## 2024-02-14 DIAGNOSIS — M85.831 OSTEOPENIA OF RIGHT FOREARM: ICD-10-CM

## 2024-02-14 DIAGNOSIS — G47.33 OSA ON CPAP: ICD-10-CM

## 2024-02-14 DIAGNOSIS — E03.9 ACQUIRED HYPOTHYROIDISM: ICD-10-CM

## 2024-02-14 DIAGNOSIS — E78.2 MIXED HYPERLIPIDEMIA: Primary | ICD-10-CM

## 2024-02-14 DIAGNOSIS — E66.01 OBESITY, MORBID (HCC): ICD-10-CM

## 2024-02-14 DIAGNOSIS — F33.42 RECURRENT MAJOR DEPRESSIVE DISORDER, IN FULL REMISSION (HCC): ICD-10-CM

## 2024-02-14 PROBLEM — K59.00 CONSTIPATION: Status: RESOLVED | Noted: 2022-01-31 | Resolved: 2024-02-14

## 2024-02-14 PROCEDURE — G0439 PPPS, SUBSEQ VISIT: HCPCS | Performed by: FAMILY MEDICINE

## 2024-02-14 PROCEDURE — 99214 OFFICE O/P EST MOD 30 MIN: CPT | Performed by: FAMILY MEDICINE

## 2024-02-14 RX ORDER — BUPROPION HYDROCHLORIDE 300 MG/1
300 TABLET ORAL DAILY
Qty: 90 TABLET | Refills: 3 | Status: SHIPPED | OUTPATIENT
Start: 2024-02-14

## 2024-02-14 RX ORDER — FLUOXETINE HYDROCHLORIDE 20 MG/1
20 CAPSULE ORAL DAILY
Qty: 90 CAPSULE | Refills: 3 | Status: SHIPPED | OUTPATIENT
Start: 2024-02-14

## 2024-02-14 RX ORDER — PRAVASTATIN SODIUM 20 MG
20 TABLET ORAL DAILY
Qty: 90 TABLET | Refills: 3 | Status: SHIPPED | OUTPATIENT
Start: 2024-02-14

## 2024-02-14 NOTE — PROGRESS NOTES
Assessment and Plan:     Problem List Items Addressed This Visit          Digestive    Fatty liver    Relevant Orders    CBC and differential       Endocrine    Hypothyroidism    Relevant Orders    TSH, 3rd generation with Free T4 reflex       Respiratory    DIONE on CPAP       Musculoskeletal and Integument    Osteopenia of right forearm       Other    Hyperlipidemia - Primary    Relevant Medications    pravastatin (PRAVACHOL) 20 mg tablet    Other Relevant Orders    Comprehensive metabolic panel    Lipid panel    Prediabetes    Relevant Orders    Hemoglobin A1C    Obesity, morbid (HCC)    Alcoholism (HCC)    Major depressive disorder in full remission (HCC)    Relevant Medications    FLUoxetine (PROzac) 20 mg capsule    buPROPion (WELLBUTRIN XL) 300 mg 24 hr tablet     Other Visit Diagnoses       Urinary incontinence, unspecified type        Medicare annual wellness visit, subsequent              Continue with current medications.  Repeat labs.  Office visit 1 year.      Depression Screening and Follow-up Plan: Patient was screened for depression during today's encounter. They screened negative with a PHQ-9 score of 0.    Urinary Incontinence Plan of Care: counseling topics discussed: limiting fluid intake 3-4 hours before bed.       Preventive health issues were discussed with patient, and age appropriate screening tests were ordered as noted in patient's After Visit Summary.  Personalized health advice and appropriate referrals for health education or preventive services given if needed, as noted in patient's After Visit Summary.     History of Present Illness:     Patient presents for a Medicare Wellness Visit    Follow up OV for chronic medical problems.  Medications reviewed Hospitalizations/surgeries/SH/FH reviewed see note.  Hyperlipidemia mixed type on Pravastatin 20 mg daily.  Last lipid profile 02/2023 cholesterol 181. Triglycerides 165 increased 239. HDL 54. LDL 57. 02/2022 LFTs normal. Prediabetes  02/2023 . A1c 5.7.  Mammogram 09/2023. Part time  at the          Lab Results   Component Value Date    WBC 6.9 02/14/2023    HGB 13.9 02/14/2023    HCT 40.8 02/14/2023    MCV 92 02/14/2023     02/14/2023     Lab Results   Component Value Date     01/20/2017    SODIUM 140 02/14/2023    K 4.4 02/14/2023     02/14/2023    CO2 22 02/14/2023    ANIONGAP 11 04/15/2014    AGAP 6 06/20/2020    BUN 13 02/14/2023    CREATININE 0.63 02/14/2023    GLUC 112 (H) 02/14/2023    CALCIUM 9.3 06/20/2020    AST 25 02/14/2023    ALT 32 02/14/2023    ALKPHOS 80 06/20/2020    PROT 6.8 01/20/2017    TP 6.6 02/14/2023    BILITOT 0.5 01/20/2017    TBILI 0.6 02/14/2023    EGFR 98 02/14/2023     Lab Results   Component Value Date    HGBA1C 5.7 (H) 02/14/2023     Lab Results   Component Value Date    CHOLESTEROL 181 02/14/2023    CHOLESTEROL 159 03/28/2022    CHOLESTEROL 190 02/23/2021     Lab Results   Component Value Date    HDL 54 02/14/2023    HDL 51 03/28/2022    HDL 60 02/23/2021     Lab Results   Component Value Date    TRIG 239 (H) 02/14/2023    TRIG 165 (H) 03/28/2022    TRIG 201 (H) 02/23/2021     Lab Results   Component Value Date    LDLCALC 87 02/14/2023         Patient Care Team:  Joshua Vazquez MD as PCP - General  MD Boyd Givens MD as Endoscopist     Review of Systems:     Review of Systems   Constitutional:  Negative for appetite change, chills, fatigue, fever and unexpected weight change.            HENT:  Negative for congestion, ear pain, rhinorrhea, sore throat and trouble swallowing.    Eyes:  Negative for visual disturbance.   Respiratory:  Positive for apnea. Negative for cough, shortness of breath and wheezing.         DIONE on CPAP   Cardiovascular:  Negative for chest pain, palpitations and leg swelling.   Gastrointestinal:  Negative for abdominal pain, blood in stool, constipation, diarrhea, nausea and vomiting.        History of colon polyps.  Last colonoscopy 03/2023 . 03/2022 CT scan Extensive colonic diverticulosis. Hepatic steatosis.  +  colon cancer       Lab Results       Component                Value               Date                       ALT                      32                  02/14/2023                 AST                      25                  02/14/2023                 ALKPHOS                  80                  06/20/2020                 BILITOT                  0.5                 01/20/2017               Endocrine:        Hypothyroidism on Levothyroxine 200 mcg daily. 02/2022 TSH low at 0.116.     DEXA scan 03/2021 DEXA scan osteopenia left distal forearm.  Lumbar spine and femur demonstrate normal BMD.  Slight decrease in overall BMD from prior study 02/2019.  On MVI daily                    TSH                      0.145 (L)           02/14/2023               Genitourinary:  Negative for difficulty urinating.        GYN and pap smear 10/2021   Musculoskeletal:  Negative for arthralgias and myalgias.        S/p fracture left distal radius 02/2016 secondary to fall,   Skin:  Negative for rash.        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 Chronic hives with prior episodes of swelling of lips and tongue. Prior allergist evaluation. Food allergy panel 07/2017 + shrimp. no reaction to shrimp. Repeat allergy testing 2018 + trees, grass, dust and eggs. No improvement with Allegra. She has an Epi Pen.    Allergic/Immunologic: Positive for food allergies.   Neurological:  Negative for dizziness and headaches.   Hematological:  Negative for adenopathy. Does not bruise/bleed easily.   Psychiatric/Behavioral:  Positive for dysphoric mood. Negative for sleep disturbance and suicidal ideas. The patient is not nervous/anxious.         Longstanding history of depression stable on Wellbutrin  mg/day and Prozac 20 mg daily. History of alcoholism with prior in patient rehab and completion of a partial out program              Problem List:     Patient Active Problem List   Diagnosis    Hyperlipidemia    Hypothyroidism    Prediabetes    DIONE on CPAP    Food allergy    Chronic urticaria    Osteopenia of right forearm    Fatty liver    Sleep disturbance    Obesity, morbid (HCC)    Alcoholism (HCC)    Major depressive disorder in full remission (HCC)      Past Medical and Surgical History:     Past Medical History:   Diagnosis Date    Anxiety     Closed fracture of distal radius and ulna, left, initial encounter     Last Assessed:  5/13/16    CPAP (continuous positive airway pressure) dependence     Depression     Disease of thyroid gland     Fibromyalgia     GERD (gastroesophageal reflux disease)     Hyperlipidemia     Sleep apnea     pt uses c-pap    Sleep apnea, obstructive     Squamous cell carcinoma of skin     Last Assessed:  1/17/17     Past Surgical History:   Procedure Laterality Date    BREAST EXCISIONAL BIOPSY  1988    doesnt remember which breast and cant see a scar    COLON SURGERY      COLONOSCOPY N/A 07/14/2016    Procedure: COLONOSCOPY WITH ENDOCLIPS AND INJECTION FOR HEMOSTASIS;  Surgeon: Boyd Holt MD;  Location: [facility];  Service:     COLONOSCOPY  08/2019    FL INJECTION LEFT HIP (NON ARTHROGRAM)  09/15/2023    FL INJECTION LEFT HIP (NON ARTHROGRAM)  01/03/2024    FRACTURE SURGERY      MOHS RECONSTRUCTION      NASAL FRACTURE SURGERY      ID OPTX DSTL RADL I-ARTIC FX/EPIPHYSL SEP 3 FRAG Left 02/25/2016    Procedure: OPEN REDUCTION W/ INTERNAL FIXATION (ORIF)DISTAL  RADIUS ;  Surgeon: Boyd Wagner MD;  Location: [facility];  Service: Orthopedics      Family History:     Family History   Problem Relation Age of Onset    Cancer Mother         Liver cancer    Breast cancer Mother 55    Alcohol abuse Mother     Cancer Father         Colon    Colon cancer Father         Bladder cancer    Alcohol abuse Father     Cancer Sister     No Known Problems Daughter     No Known Problems Maternal Grandmother     No Known  Problems Maternal Grandfather     No Known Problems Paternal Grandmother     No Known Problems Paternal Grandfather     No Known Problems Maternal Aunt     No Known Problems Paternal Aunt     No Known Problems Paternal Aunt     No Known Problems Paternal Aunt     No Known Problems Paternal Aunt     No Known Problems Paternal Aunt       Social History:     Social History     Socioeconomic History    Marital status: /Civil Union     Spouse name: None    Number of children: None    Years of education: None    Highest education level: None   Occupational History    None   Tobacco Use    Smoking status: Former     Current packs/day: 0.00     Average packs/day: 1 pack/day for 30.0 years (30.0 ttl pk-yrs)     Types: Cigarettes     Start date: 1972     Quit date: 2002     Years since quittin.1     Passive exposure: Past    Smokeless tobacco: Never   Vaping Use    Vaping status: Never Used   Substance and Sexual Activity    Alcohol use: Not Currently    Drug use: No    Sexual activity: Not Currently     Partners: Male     Birth control/protection: Male Sterilization   Other Topics Concern    None   Social History Narrative    2 cups of coffee daily     Social Determinants of Health     Financial Resource Strain: Low Risk  (2024)    Overall Financial Resource Strain (CARDIA)     Difficulty of Paying Living Expenses: Not hard at all   Food Insecurity: Not on file   Transportation Needs: No Transportation Needs (2024)    PRAPARE - Transportation     Lack of Transportation (Medical): No     Lack of Transportation (Non-Medical): No   Physical Activity: Not on file   Stress: Not on file   Social Connections: Not on file   Intimate Partner Violence: Not on file   Housing Stability: Not on file      Medications and Allergies:     Current Outpatient Medications   Medication Sig Dispense Refill    buPROPion (WELLBUTRIN XL) 300 mg 24 hr tablet Take 1 tablet (300 mg total) by mouth daily 90 tablet 3     Calcium Carbonate-Vit D-Min (CALCIUM 1200 PO) Take by mouth daily      cholecalciferol (VITAMIN D3) 25 mcg (1,000 units) tablet       FLUoxetine (PROzac) 20 mg capsule Take 1 capsule (20 mg total) by mouth daily 90 capsule 3    levothyroxine 175 mcg tablet Take 1 tablet (175 mcg total) by mouth daily in the early morning 90 tablet 3    Magnesium 200 MG TABS Take 1 tablet by mouth daily      Multiple Vitamins-Minerals (MULTIVITAMIN ADULT PO) Take 1 tablet by mouth daily      Omega-3 Fatty Acids (FISH OIL) 1000 MG CPDR Take 1 capsule by mouth daily      pravastatin (PRAVACHOL) 20 mg tablet Take 1 tablet (20 mg total) by mouth daily 90 tablet 3    vitamin E 100 UNIT capsule Take 1 capsule by mouth daily       Current Facility-Administered Medications   Medication Dose Route Frequency Provider Last Rate Last Admin    EPINEPHrine PF (ADRENALIN) 1 mg/mL injection 0.5 mg  0.5 mg Intramuscular Once Aida Urias MD         Allergies   Allergen Reactions    Erythromycin Shortness Of Breath     Reaction Date: 20Oct2011;     Cefprozil      Reaction Date: 20Oct2011;     Cephalosporins       Immunizations:     Immunization History   Administered Date(s) Administered    COVID-19 PFIZER VACCINE 0.3 ML IM 03/21/2021, 04/11/2021, 11/23/2021, 04/28/2022, 12/16/2022    COVID-19 Pfizer Vac BIVALENT Remi-sucrose 12 Yr+ IM 12/16/2022    COVID-19 Pfizer vac (Remi-sucrose, gray cap) 12 yr+ IM 04/28/2022    INFLUENZA 10/06/2015, 10/21/2016, 10/02/2017, 10/29/2021, 09/26/2022, 10/15/2022, 10/10/2023    Influenza Quadrivalent, 6-35 Months IM 10/06/2015, 10/21/2016, 10/02/2017    Influenza, high dose seasonal 0.7 mL 10/29/2021    Influenza, injectable, quadrivalent, preservative free 0.5 mL 10/23/2018    Influenza, recombinant, quadrivalent,injectable, preservative free 09/16/2019, 10/05/2020    Influenza, seasonal, injectable 09/21/2012, 10/08/2013, 10/03/2014    Pneumococcal Polysaccharide PPV23 02/10/2022    Tdap 03/15/2013    Zoster  Vaccine Recombinant 09/06/2019, 11/12/2019      Health Maintenance:         Topic Date Due    Hepatitis C Screening  Never done    Breast Cancer Screening: Mammogram  09/21/2024    Colorectal Cancer Screening  03/27/2028         Topic Date Due    Pneumococcal Vaccine: 65+ Years (2 of 2 - PCV) 02/10/2023    COVID-19 Vaccine (8 - 2023-24 season) 09/01/2023      Medicare Screening Tests and Risk Assessments:     Last Medicare Wellness visit information reviewed, patient interviewed and updates made to the record today.      Health Risk Assessment:   Patient rates overall health as good. Patient feels that their physical health rating is slightly worse. Patient is very satisfied with their life. Eyesight was rated as same. Hearing was rated as same. Patient feels that their emotional and mental health rating is much better. Patients states they are never, rarely angry. Patient states they are never, rarely unusually tired/fatigued. Pain experienced in the last 7 days has been some. Patient's pain rating has been 5/10. Patient states that she has experienced no weight loss or gain in last 6 months.     Depression Screening:   PHQ-9 Score: 0      Fall Risk Screening:   In the past year, patient has experienced: no history of falling in past year      Urinary Incontinence Screening:   Patient has leaked urine accidently in the last six months. UI managed w/out medication     Home Safety:  Patient does not have trouble with stairs inside or outside of their home. Patient has working smoke alarms and has working carbon monoxide detector. Home safety hazards include: none.     Nutrition:   Current diet is Regular, Low Carb and Limited junk food.     Medications:   Patient is currently taking over-the-counter supplements. OTC medications include: see medication list. Patient is able to manage medications.     Activities of Daily Living (ADLs)/Instrumental Activities of Daily Living (IADLs):   Walk and transfer into and out of  bed and chair?: Yes  Dress and groom yourself?: Yes    Bathe or shower yourself?: Yes    Feed yourself? Yes  Do your laundry/housekeeping?: Yes  Manage your money, pay your bills and track your expenses?: Yes  Make your own meals?: Yes    Do your own shopping?: Yes    Durable Medical Equipment Suppliers  [employer]    Previous Hospitalizations:   Any hospitalizations or ED visits within the last 12 months?: No      Advance Care Planning:   Living will: Yes    Durable POA for healthcare: Yes    Advanced directive: Yes      Cognitive Screening:   Provider or family/friend/caregiver concerned regarding cognition?: No    PREVENTIVE SCREENINGS      Cardiovascular Screening:    General: History Lipid Disorder    Due for: Lipid Panel      Diabetes Screening:       Due for: Blood Glucose      Colorectal Cancer Screening:     General: Screening Current      Breast Cancer Screening:     General: Screening Current      Cervical Cancer Screening:    General: Screening Not Indicated      Osteoporosis Screening:    General: Screening Current      Abdominal Aortic Aneurysm (AAA) Screening:        General: Screening Not Indicated      Lung Cancer Screening:     General: Screening Not Indicated      Hepatitis C Screening:    General: Patient Declines    Screening, Brief Intervention, and Referral to Treatment (SBIRT)    Screening  Typical number of drinks in a day: 0  Typical number of drinks in a week: 0  Interpretation: Low risk drinking behavior.    AUDIT-C Screenin) How often did you have a drink containing alcohol in the past year? never  2) How many drinks did you have on a typical day when you were drinking in the past year? 0  3) How often did you have 6 or more drinks on one occasion in the past year? never    AUDIT-C Score: 0  Interpretation: Score 0-2 (female): Negative screen for alcohol misuse    Single Item Drug Screening:  How often have you used an illegal drug (including marijuana) or a prescription medication for  "non-medical reasons in the past year? never    Single Item Drug Screen Score: 0  Interpretation: Negative screen for possible drug use disorder    Brief Intervention  Alcohol & drug use screenings were reviewed. No concerns regarding substance use disorder identified.     Other Counseling Topics:   Calcium and vitamin D intake and regular weightbearing exercise.          Physical Exam:     /72 (BP Location: Left arm, Patient Position: Sitting, Cuff Size: Standard)   Pulse 74   Temp 97.9 °F (36.6 °C) (Temporal)   Resp 18   Ht 5' 6\" (1.676 m)   Wt 102 kg (224 lb)   SpO2 97%   BMI 36.15 kg/m²     BP Readings from Last 3 Encounters:   02/14/24 114/72   02/06/24 116/70   01/26/24 110/72     Wt Readings from Last 3 Encounters:   02/14/24 102 kg (224 lb)   02/06/24 102 kg (224 lb)   12/08/23 102 kg (224 lb)            Physical Exam  Constitutional:       General: She is not in acute distress.  HENT:      Right Ear: Tympanic membrane and ear canal normal.      Left Ear: Tympanic membrane and ear canal normal.      Mouth/Throat:      Mouth: No oral lesions.      Pharynx: Oropharynx is clear.   Eyes:      General: No scleral icterus.     Extraocular Movements: Extraocular movements intact.      Conjunctiva/sclera: Conjunctivae normal.      Pupils: Pupils are equal, round, and reactive to light.   Neck:      Thyroid: No thyroid mass or thyromegaly.      Vascular: Normal carotid pulses. No carotid bruit or JVD.   Cardiovascular:      Rate and Rhythm: Normal rate and regular rhythm.      Heart sounds: No murmur heard.     No gallop.   Pulmonary:      Effort: Pulmonary effort is normal.      Breath sounds: Normal breath sounds. No wheezing or rales.   Musculoskeletal:      Right lower leg: No edema.      Left lower leg: No edema.   Lymphadenopathy:      Cervical: No cervical adenopathy.   Skin:     Findings: No rash.   Neurological:      General: No focal deficit present.      Mental Status: She is alert and oriented " to person, place, and time.   Psychiatric:         Mood and Affect: Mood normal.         Behavior: Behavior normal.         Cognition and Memory: Cognition normal.          Joshua Vazquez MD

## 2024-02-14 NOTE — PROGRESS NOTES
Assessment and Plan:     Problem List Items Addressed This Visit    None       Preventive health issues were discussed with patient, and age appropriate screening tests were ordered as noted in patient's After Visit Summary.  Personalized health advice and appropriate referrals for health education or preventive services given if needed, as noted in patient's After Visit Summary.     History of Present Illness:     Patient presents for a Medicare Wellness Visit    HPI   Patient Care Team:  Joshua Vazquez MD as PCP - General  MD Boyd Givens MD as Endoscopist     Review of Systems:     Review of Systems     Problem List:     Patient Active Problem List   Diagnosis    Hyperlipidemia    Hypothyroidism    Prediabetes    DIONE on CPAP    Food allergy    Chronic urticaria    Osteopenia of right forearm    Fatty liver    Sleep disturbance    Obesity, morbid (HCC)    Constipation    Alcoholism (HCC)    Major depressive disorder in full remission (HCC)      Past Medical and Surgical History:     Past Medical History:   Diagnosis Date    Anxiety     Closed fracture of distal radius and ulna, left, initial encounter     Last Assessed:  5/13/16    CPAP (continuous positive airway pressure) dependence     Depression     Disease of thyroid gland     Fibromyalgia     GERD (gastroesophageal reflux disease)     Hyperlipidemia     Sleep apnea     pt uses c-pap    Sleep apnea, obstructive     Squamous cell carcinoma of skin     Last Assessed:  1/17/17     Past Surgical History:   Procedure Laterality Date    BREAST EXCISIONAL BIOPSY  1988    doesnt remember which breast and cant see a scar    COLON SURGERY      COLONOSCOPY N/A 07/14/2016    Procedure: COLONOSCOPY WITH ENDOCLIPS AND INJECTION FOR HEMOSTASIS;  Surgeon: Boyd Holt MD;  Location: [facility];  Service:     COLONOSCOPY  08/2019    FL INJECTION LEFT HIP (NON ARTHROGRAM)  09/15/2023    FL INJECTION LEFT HIP (NON ARTHROGRAM)  01/03/2024     FRACTURE SURGERY      MOHS RECONSTRUCTION      NASAL FRACTURE SURGERY      KS OPTX DSTL RADL I-ARTIC FX/EPIPHYSL SEP 3 FRAG Left 2016    Procedure: OPEN REDUCTION W/ INTERNAL FIXATION (ORIF)DISTAL  RADIUS ;  Surgeon: Boyd Wagner MD;  Location: [facility];  Service: Orthopedics      Family History:     Family History   Problem Relation Age of Onset    Cancer Mother         Liver cancer    Breast cancer Mother 55    Alcohol abuse Mother     Cancer Father         Colon    Colon cancer Father         Bladder cancer    Alcohol abuse Father     Cancer Sister     No Known Problems Daughter     No Known Problems Maternal Grandmother     No Known Problems Maternal Grandfather     No Known Problems Paternal Grandmother     No Known Problems Paternal Grandfather     No Known Problems Maternal Aunt     No Known Problems Paternal Aunt     No Known Problems Paternal Aunt     No Known Problems Paternal Aunt     No Known Problems Paternal Aunt     No Known Problems Paternal Aunt       Social History:     Social History     Socioeconomic History    Marital status: /Civil Union     Spouse name: None    Number of children: None    Years of education: None    Highest education level: None   Occupational History    None   Tobacco Use    Smoking status: Former     Current packs/day: 0.00     Average packs/day: 1 pack/day for 30.0 years (30.0 ttl pk-yrs)     Types: Cigarettes     Start date: 1972     Quit date: 2002     Years since quittin.1     Passive exposure: Past    Smokeless tobacco: Never   Vaping Use    Vaping status: Never Used   Substance and Sexual Activity    Alcohol use: Not Currently    Drug use: No    Sexual activity: Not Currently     Partners: Male     Birth control/protection: Male Sterilization   Other Topics Concern    None   Social History Narrative    2 cups of coffee daily     Social Determinants of Health     Financial Resource Strain: Low Risk  (2024)    Overall Financial Resource  Strain (CARDIA)     Difficulty of Paying Living Expenses: Not hard at all   Food Insecurity: Not on file   Transportation Needs: No Transportation Needs (2/7/2024)    PRAPARE - Transportation     Lack of Transportation (Medical): No     Lack of Transportation (Non-Medical): No   Physical Activity: Not on file   Stress: Not on file   Social Connections: Not on file   Intimate Partner Violence: Not on file   Housing Stability: Not on file      Medications and Allergies:     Current Outpatient Medications   Medication Sig Dispense Refill    buPROPion (WELLBUTRIN XL) 300 mg 24 hr tablet Take 1 tablet (300 mg total) by mouth daily 90 tablet 3    Calcium Carbonate-Vit D-Min (CALCIUM 1200 PO) Take by mouth daily      cholecalciferol (VITAMIN D3) 25 mcg (1,000 units) tablet       FLUoxetine (PROzac) 20 mg capsule Take 1 capsule (20 mg total) by mouth daily 90 capsule 3    levothyroxine 175 mcg tablet Take 1 tablet (175 mcg total) by mouth daily in the early morning 90 tablet 3    Magnesium 200 MG TABS Take 1 tablet by mouth daily      methylPREDNISolone 4 MG tablet therapy pack Use as directed on package 21 each 0    Multiple Vitamins-Minerals (MULTIVITAMIN ADULT PO) Take 1 tablet by mouth daily      Omega-3 Fatty Acids (FISH OIL) 1000 MG CPDR Take 1 capsule by mouth daily      pravastatin (PRAVACHOL) 20 mg tablet Take 1 tablet (20 mg total) by mouth daily 90 tablet 3    vitamin E 100 UNIT capsule Take 1 capsule by mouth daily       Current Facility-Administered Medications   Medication Dose Route Frequency Provider Last Rate Last Admin    EPINEPHrine PF (ADRENALIN) 1 mg/mL injection 0.5 mg  0.5 mg Intramuscular Once Aida Urias MD         Allergies   Allergen Reactions    Erythromycin Shortness Of Breath     Reaction Date: 20Oct2011;     Cefprozil      Reaction Date: 20Oct2011;     Cephalosporins       Immunizations:     Immunization History   Administered Date(s) Administered    COVID-19 PFIZER VACCINE 0.3 ML IM  03/21/2021, 04/11/2021, 11/23/2021, 04/28/2022, 12/16/2022    INFLUENZA 10/06/2015, 10/21/2016, 10/02/2017, 10/15/2022    Influenza Quadrivalent, 6-35 Months IM 10/06/2015, 10/21/2016, 10/02/2017    Influenza, high dose seasonal 0.7 mL 10/29/2021    Influenza, injectable, quadrivalent, preservative free 0.5 mL 10/23/2018    Influenza, recombinant, quadrivalent,injectable, preservative free 09/16/2019, 10/05/2020    Influenza, seasonal, injectable 09/21/2012, 10/08/2013, 10/03/2014    Pneumococcal Polysaccharide PPV23 02/10/2022    Tdap 03/15/2013    Zoster Vaccine Recombinant 09/06/2019, 11/12/2019      Health Maintenance:         Topic Date Due    Hepatitis C Screening  Never done    Breast Cancer Screening: Mammogram  09/21/2024    Colorectal Cancer Screening  03/27/2028         Topic Date Due    Pneumococcal Vaccine: 65+ Years (2 of 2 - PCV) 02/10/2023    COVID-19 Vaccine (6 - 2023-24 season) 09/01/2023      Medicare Screening Tests and Risk Assessments:     Megan is here for her Subsequent Wellness visit. Last Medicare Wellness visit information reviewed, patient interviewed and updates made to the record today.      Health Risk Assessment:   Patient rates overall health as good. Patient feels that their physical health rating is slightly worse. Patient is very satisfied with their life. Eyesight was rated as same. Hearing was rated as same. Patient feels that their emotional and mental health rating is much better. Patients states they are never, rarely angry. Patient states they are never, rarely unusually tired/fatigued. Pain experienced in the last 7 days has been some. Patient's pain rating has been 5/10. Patient states that she has experienced no weight loss or gain in last 6 months.     Depression Screening:   PHQ-9 Score: 0      Fall Risk Screening:   In the past year, patient has experienced: no history of falling in past year      Urinary Incontinence Screening:   Patient has leaked urine accidently in  the last six months.     Home Safety:  Patient does not have trouble with stairs inside or outside of their home. Patient has working smoke alarms and has working carbon monoxide detector. Home safety hazards include: none.     Nutrition:   Current diet is Regular, Low Carb and Limited junk food.     Medications:   Patient is currently taking over-the-counter supplements. OTC medications include: see medication list. Patient is able to manage medications.     Activities of Daily Living (ADLs)/Instrumental Activities of Daily Living (IADLs):   Walk and transfer into and out of bed and chair?: Yes  Dress and groom yourself?: Yes    Bathe or shower yourself?: Yes    Feed yourself? Yes  Do your laundry/housekeeping?: Yes  Manage your money, pay your bills and track your expenses?: Yes  Make your own meals?: Yes    Do your own shopping?: Yes    Durable Medical Equipment Suppliers  [employer]    Previous Hospitalizations:   Any hospitalizations or ED visits within the last 12 months?: No      Advance Care Planning:   Living will: Yes    Durable POA for healthcare: Yes    Advanced directive: Yes      PREVENTIVE SCREENINGS      Cardiovascular Screening:    General: Screening Not Indicated and History Lipid Disorder      Diabetes Screening:     General: Screening Current      Colorectal Cancer Screening:     General: Screening Current      Breast Cancer Screening:     General: Screening Current      Cervical Cancer Screening:    General: Screening Not Indicated      Lung Cancer Screening:     General: Screening Not Indicated    Screening, Brief Intervention, and Referral to Treatment (SBIRT)    Screening  Typical number of drinks in a day: 0  Typical number of drinks in a week: 0  Interpretation: Low risk drinking behavior.    AUDIT-C Screenin) How often did you have a drink containing alcohol in the past year? never  2) How many drinks did you have on a typical day when you were drinking in the past year? 0  3) How often  did you have 6 or more drinks on one occasion in the past year? never    AUDIT-C Score: 0  Interpretation: Score 0-2 (female): Negative screen for alcohol misuse    Single Item Drug Screening:  How often have you used an illegal drug (including marijuana) or a prescription medication for non-medical reasons in the past year? never    Single Item Drug Screen Score: 0  Interpretation: Negative screen for possible drug use disorder    No results found.     Physical Exam:     There were no vitals taken for this visit.    Physical Exam     Joshua Vazquez MD

## 2024-02-14 NOTE — PATIENT INSTRUCTIONS
Medicare Preventive Visit Patient Instructions  Thank you for completing your Welcome to Medicare Visit or Medicare Annual Wellness Visit today. Your next wellness visit will be due in one year (2/14/2025).  The screening/preventive services that you may require over the next 5-10 years are detailed below. Some tests may not apply to you based off risk factors and/or age. Screening tests ordered at today's visit but not completed yet may show as past due. Also, please note that scanned in results may not display below.  Preventive Screenings:  Service Recommendations Previous Testing/Comments   Colorectal Cancer Screening  * Colonoscopy    * Fecal Occult Blood Test (FOBT)/Fecal Immunochemical Test (FIT)  * Fecal DNA/Cologuard Test  * Flexible Sigmoidoscopy Age: 45-75 years old   Colonoscopy: every 10 years (may be performed more frequently if at higher risk)  OR  FOBT/FIT: every 1 year  OR  Cologuard: every 3 years  OR  Sigmoidoscopy: every 5 years  Screening may be recommended earlier than age 45 if at higher risk for colorectal cancer. Also, an individualized decision between you and your healthcare provider will decide whether screening between the ages of 76-85 would be appropriate. Colonoscopy: 03/27/2023  FOBT/FIT: 04/04/2022  Cologuard: 04/04/2022  Sigmoidoscopy: 04/04/2022    Screening Current     Breast Cancer Screening Age: 40+ years old  Frequency: every 1-2 years  Not required if history of left and right mastectomy Mammogram: 09/21/2023    Screening Current   Cervical Cancer Screening Between the ages of 21-29, pap smear recommended once every 3 years.   Between the ages of 30-65, can perform pap smear with HPV co-testing every 5 years.   Recommendations may differ for women with a history of total hysterectomy, cervical cancer, or abnormal pap smears in past. Pap Smear: 10/14/2021    Screening Not Indicated   Hepatitis C Screening Once for adults born between 1945 and 1965  More frequently in patients  at high risk for Hepatitis C Hep C Antibody: Not on file        Diabetes Screening 1-2 times per year if you're at risk for diabetes or have pre-diabetes Fasting glucose: No results in last 5 years (No results in last 5 years)  A1C: 5.7 % (2/14/2023)  Screening Current   Cholesterol Screening Once every 5 years if you don't have a lipid disorder. May order more often based on risk factors. Lipid panel: 02/14/2023    Screening Not Indicated  History Lipid Disorder     Other Preventive Screenings Covered by Medicare:  Abdominal Aortic Aneurysm (AAA) Screening: covered once if your at risk. You're considered to be at risk if you have a family history of AAA.  Lung Cancer Screening: covers low dose CT scan once per year if you meet all of the following conditions: (1) Age 55-77; (2) No signs or symptoms of lung cancer; (3) Current smoker or have quit smoking within the last 15 years; (4) You have a tobacco smoking history of at least 20 pack years (packs per day multiplied by number of years you smoked); (5) You get a written order from a healthcare provider.  Glaucoma Screening: covered annually if you're considered high risk: (1) You have diabetes OR (2) Family history of glaucoma OR (3)  aged 50 and older OR (4)  American aged 65 and older  Osteoporosis Screening: covered every 2 years if you meet one of the following conditions: (1) You're estrogen deficient and at risk for osteoporosis based off medical history and other findings; (2) Have a vertebral abnormality; (3) On glucocorticoid therapy for more than 3 months; (4) Have primary hyperparathyroidism; (5) On osteoporosis medications and need to assess response to drug therapy.   Last bone density test (DXA Scan): 03/01/2021.  HIV Screening: covered annually if you're between the age of 15-65. Also covered annually if you are younger than 15 and older than 65 with risk factors for HIV infection. For pregnant patients, it is covered up to 3  times per pregnancy.    Immunizations:  Immunization Recommendations   Influenza Vaccine Annual influenza vaccination during flu season is recommended for all persons aged >= 6 months who do not have contraindications   Pneumococcal Vaccine   * Pneumococcal conjugate vaccine = PCV13 (Prevnar 13), PCV15 (Vaxneuvance), PCV20 (Prevnar 20)  * Pneumococcal polysaccharide vaccine = PPSV23 (Pneumovax) Adults 19-65 yo with certain risk factors or if 65+ yo  If never received any pneumonia vaccine: recommend Prevnar 20 (PCV20)  Give PCV20 if previously received 1 dose of PCV13 or PPSV23   Hepatitis B Vaccine 3 dose series if at intermediate or high risk (ex: diabetes, end stage renal disease, liver disease)   Respiratory syncytial virus (RSV) Vaccine - COVERED BY MEDICARE PART D  * RSVPreF3 (Arexvy) CDC recommends that adults 60 years of age and older may receive a single dose of RSV vaccine using shared clinical decision-making (SCDM)   Tetanus (Td) Vaccine - COST NOT COVERED BY MEDICARE PART B Following completion of primary series, a booster dose should be given every 10 years to maintain immunity against tetanus. Td may also be given as tetanus wound prophylaxis.   Tdap Vaccine - COST NOT COVERED BY MEDICARE PART B Recommended at least once for all adults. For pregnant patients, recommended with each pregnancy.   Shingles Vaccine (Shingrix) - COST NOT COVERED BY MEDICARE PART B  2 shot series recommended in those 19 years and older who have or will have weakened immune systems or those 50 years and older     Health Maintenance Due:      Topic Date Due   • Hepatitis C Screening  Never done   • Breast Cancer Screening: Mammogram  09/21/2024   • Colorectal Cancer Screening  03/27/2028     Immunizations Due:      Topic Date Due   • Pneumococcal Vaccine: 65+ Years (2 of 2 - PCV) 02/10/2023   • COVID-19 Vaccine (6 - 2023-24 season) 09/01/2023     Advance Directives   What are advance directives?  Advance directives are legal  documents that state your wishes and plans for medical care. These plans are made ahead of time in case you lose your ability to make decisions for yourself. Advance directives can apply to any medical decision, such as the treatments you want, and if you want to donate organs.   What are the types of advance directives?  There are many types of advance directives, and each state has rules about how to use them. You may choose a combination of any of the following:  Living will:  This is a written record of the treatment you want. You can also choose which treatments you do not want, which to limit, and which to stop at a certain time. This includes surgery, medicine, IV fluid, and tube feedings.   Durable power of  for healthcare (DPAHC):  This is a written record that states who you want to make healthcare choices for you when you are unable to make them for yourself. This person, called a proxy, is usually a family member or a friend. You may choose more than 1 proxy.  Do not resuscitate (DNR) order:  A DNR order is used in case your heart stops beating or you stop breathing. It is a request not to have certain forms of treatment, such as CPR. A DNR order may be included in other types of advance directives.  Medical directive:  This covers the care that you want if you are in a coma, near death, or unable to make decisions for yourself. You can list the treatments you want for each condition. Treatment may include pain medicine, surgery, blood transfusions, dialysis, IV or tube feedings, and a ventilator (breathing machine).  Values history:  This document has questions about your views, beliefs, and how you feel and think about life. This information can help others choose the care that you would choose.  Why are advance directives important?  An advance directive helps you control your care. Although spoken wishes may be used, it is better to have your wishes written down. Spoken wishes can be  misunderstood, or not followed. Treatments may be given even if you do not want them. An advance directive may make it easier for your family to make difficult choices about your care.   Urinary Incontinence   Urinary incontinence (UI)  is when you lose control of your bladder. UI develops because your bladder cannot store or empty urine properly. The 3 most common types of UI are stress incontinence, urge incontinence, or both.  Medicines:   May be given to help strengthen your bladder control. Report any side effects of medication to your healthcare provider.  Do pelvic muscle exercises often:  Your pelvic muscles help you stop urinating. Squeeze these muscles tight for 5 seconds, then relax for 5 seconds. Gradually work up to squeezing for 10 seconds. Do 3 sets of 15 repetitions a day, or as directed. This will help strengthen your pelvic muscles and improve bladder control.  Train your bladder:  Go to the bathroom at set times, such as every 2 hours, even if you do not feel the urge to go. You can also try to hold your urine when you feel the urge to go. For example, hold your urine for 5 minutes when you feel the urge to go. As that becomes easier, hold your urine for 10 minutes.   Self-care:   Keep a UI record.  Write down how often you leak urine and how much you leak. Make a note of what you were doing when you leaked urine.  Drink liquids as directed. You may need to limit the amount of liquid you drink to help control your urine leakage. Do not drink any liquid right before you go to bed. Limit or do not have drinks that contain caffeine or alcohol.   Prevent constipation.  Eat a variety of high-fiber foods. Good examples are high-fiber cereals, beans, vegetables, and whole-grain breads. Walking is the best way to trigger your intestines to have a bowel movement.  Exercise regularly and maintain a healthy weight.  Weight loss and exercise will decrease pressure on your bladder and help you control your  leakage.   Use a catheter as directed  to help empty your bladder. A catheter is a tiny, plastic tube that is put into your bladder to drain your urine.   Go to behavior therapy as directed.  Behavior therapy may be used to help you learn to control your urge to urinate.    Weight Management   Why it is important to manage your weight:  Being overweight increases your risk of health conditions such as heart disease, high blood pressure, type 2 diabetes, and certain types of cancer. It can also increase your risk for osteoarthritis, sleep apnea, and other respiratory problems. Aim for a slow, steady weight loss. Even a small amount of weight loss can lower your risk of health problems.  How to lose weight safely:  A safe and healthy way to lose weight is to eat fewer calories and get regular exercise. You can lose up about 1 pound a week by decreasing the number of calories you eat by 500 calories each day.   Healthy meal plan for weight management:  A healthy meal plan includes a variety of foods, contains fewer calories, and helps you stay healthy. A healthy meal plan includes the following:  Eat whole-grain foods more often.  A healthy meal plan should contain fiber. Fiber is the part of grains, fruits, and vegetables that is not broken down by your body. Whole-grain foods are healthy and provide extra fiber in your diet. Some examples of whole-grain foods are whole-wheat breads and pastas, oatmeal, brown rice, and bulgur.  Eat a variety of vegetables every day.  Include dark, leafy greens such as spinach, kale, umesh greens, and mustard greens. Eat yellow and orange vegetables such as carrots, sweet potatoes, and winter squash.   Eat a variety of fruits every day.  Choose fresh or canned fruit (canned in its own juice or light syrup) instead of juice. Fruit juice has very little or no fiber.  Eat low-fat dairy foods.  Drink fat-free (skim) milk or 1% milk. Eat fat-free yogurt and low-fat cottage cheese. Try  low-fat cheeses such as mozzarella and other reduced-fat cheeses.  Choose meat and other protein foods that are low in fat.  Choose beans or other legumes such as split peas or lentils. Choose fish, skinless poultry (chicken or turkey), or lean cuts of red meat (beef or pork). Before you cook meat or poultry, cut off any visible fat.   Use less fat and oil.  Try baking foods instead of frying them. Add less fat, such as margarine, sour cream, regular salad dressing and mayonnaise to foods. Eat fewer high-fat foods. Some examples of high-fat foods include french fries, doughnuts, ice cream, and cakes.  Eat fewer sweets.  Limit foods and drinks that are high in sugar. This includes candy, cookies, regular soda, and sweetened drinks.  Exercise:  Exercise at least 30 minutes per day on most days of the week. Some examples of exercise include walking, biking, dancing, and swimming. You can also fit in more physical activity by taking the stairs instead of the elevator or parking farther away from stores. Ask your healthcare provider about the best exercise plan for you.      © Copyright [employer] 2018 Information is for End User's use only and may not be sold, redistributed or otherwise used for commercial purposes. All illustrations and images included in CareNotes® are the copyrighted property of [employer] or [employer]      Medicare Preventive Visit Patient Instructions  Thank you for completing your Welcome to Medicare Visit or Medicare Annual Wellness Visit today. Your next wellness visit will be due in one year (2/14/2025).  The screening/preventive services that you may require over the next 5-10 years are detailed below. Some tests may not apply to you based off risk factors and/or age. Screening tests ordered at today's visit but not completed yet may show as past due. Also, please note that scanned in results may not display below.  Preventive Screenings:  Service Recommendations Previous  Testing/Comments   Colorectal Cancer Screening  * Colonoscopy    * Fecal Occult Blood Test (FOBT)/Fecal Immunochemical Test (FIT)  * Fecal DNA/Cologuard Test  * Flexible Sigmoidoscopy Age: 45-75 years old   Colonoscopy: every 10 years (may be performed more frequently if at higher risk)  OR  FOBT/FIT: every 1 year  OR  Cologuard: every 3 years  OR  Sigmoidoscopy: every 5 years  Screening may be recommended earlier than age 45 if at higher risk for colorectal cancer. Also, an individualized decision between you and your healthcare provider will decide whether screening between the ages of 76-85 would be appropriate. Colonoscopy: 03/27/2023  FOBT/FIT: 04/04/2022  Cologuard: 04/04/2022  Sigmoidoscopy: 04/04/2022    Screening Current     Breast Cancer Screening Age: 40+ years old  Frequency: every 1-2 years  Not required if history of left and right mastectomy Mammogram: 09/21/2023    Screening Current   Cervical Cancer Screening Between the ages of 21-29, pap smear recommended once every 3 years.   Between the ages of 30-65, can perform pap smear with HPV co-testing every 5 years.   Recommendations may differ for women with a history of total hysterectomy, cervical cancer, or abnormal pap smears in past. Pap Smear: 10/14/2021    Screening Not Indicated   Hepatitis C Screening Once for adults born between 1945 and 1965  More frequently in patients at high risk for Hepatitis C Hep C Antibody: Not on file        Diabetes Screening 1-2 times per year if you're at risk for diabetes or have pre-diabetes Fasting glucose: No results in last 5 years (No results in last 5 years)  A1C: 5.7 % (2/14/2023)  Screening Current   Cholesterol Screening Once every 5 years if you don't have a lipid disorder. May order more often based on risk factors. Lipid panel: 02/14/2023    Screening Not Indicated  History Lipid Disorder     Other Preventive Screenings Covered by Medicare:  Abdominal Aortic Aneurysm (AAA) Screening: covered once if  your at risk. You're considered to be at risk if you have a family history of AAA.  Lung Cancer Screening: covers low dose CT scan once per year if you meet all of the following conditions: (1) Age 55-77; (2) No signs or symptoms of lung cancer; (3) Current smoker or have quit smoking within the last 15 years; (4) You have a tobacco smoking history of at least 20 pack years (packs per day multiplied by number of years you smoked); (5) You get a written order from a healthcare provider.  Glaucoma Screening: covered annually if you're considered high risk: (1) You have diabetes OR (2) Family history of glaucoma OR (3)  aged 50 and older OR (4)  American aged 65 and older  Osteoporosis Screening: covered every 2 years if you meet one of the following conditions: (1) You're estrogen deficient and at risk for osteoporosis based off medical history and other findings; (2) Have a vertebral abnormality; (3) On glucocorticoid therapy for more than 3 months; (4) Have primary hyperparathyroidism; (5) On osteoporosis medications and need to assess response to drug therapy.   Last bone density test (DXA Scan): 03/01/2021.  HIV Screening: covered annually if you're between the age of 15-65. Also covered annually if you are younger than 15 and older than 65 with risk factors for HIV infection. For pregnant patients, it is covered up to 3 times per pregnancy.    Immunizations:  Immunization Recommendations   Influenza Vaccine Annual influenza vaccination during flu season is recommended for all persons aged >= 6 months who do not have contraindications   Pneumococcal Vaccine   * Pneumococcal conjugate vaccine = PCV13 (Prevnar 13), PCV15 (Vaxneuvance), PCV20 (Prevnar 20)  * Pneumococcal polysaccharide vaccine = PPSV23 (Pneumovax) Adults 19-63 yo with certain risk factors or if 65+ yo  If never received any pneumonia vaccine: recommend Prevnar 20 (PCV20)  Give PCV20 if previously received 1 dose of PCV13 or  PPSV23   Hepatitis B Vaccine 3 dose series if at intermediate or high risk (ex: diabetes, end stage renal disease, liver disease)   Respiratory syncytial virus (RSV) Vaccine - COVERED BY MEDICARE PART D  * RSVPreF3 (Arexvy) CDC recommends that adults 60 years of age and older may receive a single dose of RSV vaccine using shared clinical decision-making (SCDM)   Tetanus (Td) Vaccine - COST NOT COVERED BY MEDICARE PART B Following completion of primary series, a booster dose should be given every 10 years to maintain immunity against tetanus. Td may also be given as tetanus wound prophylaxis.   Tdap Vaccine - COST NOT COVERED BY MEDICARE PART B Recommended at least once for all adults. For pregnant patients, recommended with each pregnancy.   Shingles Vaccine (Shingrix) - COST NOT COVERED BY MEDICARE PART B  2 shot series recommended in those 19 years and older who have or will have weakened immune systems or those 50 years and older     Health Maintenance Due:      Topic Date Due   • Hepatitis C Screening  Never done   • Breast Cancer Screening: Mammogram  09/21/2024   • Colorectal Cancer Screening  03/27/2028     Immunizations Due:      Topic Date Due   • Pneumococcal Vaccine: 65+ Years (2 of 2 - PCV) 02/10/2023   • COVID-19 Vaccine (6 - 2023-24 season) 09/01/2023     Advance Directives   What are advance directives?  Advance directives are legal documents that state your wishes and plans for medical care. These plans are made ahead of time in case you lose your ability to make decisions for yourself. Advance directives can apply to any medical decision, such as the treatments you want, and if you want to donate organs.   What are the types of advance directives?  There are many types of advance directives, and each state has rules about how to use them. You may choose a combination of any of the following:  Living will:  This is a written record of the treatment you want. You can also choose which treatments you  do not want, which to limit, and which to stop at a certain time. This includes surgery, medicine, IV fluid, and tube feedings.   Durable power of  for healthcare (DPAHC):  This is a written record that states who you want to make healthcare choices for you when you are unable to make them for yourself. This person, called a proxy, is usually a family member or a friend. You may choose more than 1 proxy.  Do not resuscitate (DNR) order:  A DNR order is used in case your heart stops beating or you stop breathing. It is a request not to have certain forms of treatment, such as CPR. A DNR order may be included in other types of advance directives.  Medical directive:  This covers the care that you want if you are in a coma, near death, or unable to make decisions for yourself. You can list the treatments you want for each condition. Treatment may include pain medicine, surgery, blood transfusions, dialysis, IV or tube feedings, and a ventilator (breathing machine).  Values history:  This document has questions about your views, beliefs, and how you feel and think about life. This information can help others choose the care that you would choose.  Why are advance directives important?  An advance directive helps you control your care. Although spoken wishes may be used, it is better to have your wishes written down. Spoken wishes can be misunderstood, or not followed. Treatments may be given even if you do not want them. An advance directive may make it easier for your family to make difficult choices about your care.   Urinary Incontinence   Urinary incontinence (UI)  is when you lose control of your bladder. UI develops because your bladder cannot store or empty urine properly. The 3 most common types of UI are stress incontinence, urge incontinence, or both.  Medicines:   May be given to help strengthen your bladder control. Report any side effects of medication to your healthcare provider.  Do pelvic muscle  exercises often:  Your pelvic muscles help you stop urinating. Squeeze these muscles tight for 5 seconds, then relax for 5 seconds. Gradually work up to squeezing for 10 seconds. Do 3 sets of 15 repetitions a day, or as directed. This will help strengthen your pelvic muscles and improve bladder control.  Train your bladder:  Go to the bathroom at set times, such as every 2 hours, even if you do not feel the urge to go. You can also try to hold your urine when you feel the urge to go. For example, hold your urine for 5 minutes when you feel the urge to go. As that becomes easier, hold your urine for 10 minutes.   Self-care:   Keep a UI record.  Write down how often you leak urine and how much you leak. Make a note of what you were doing when you leaked urine.  Drink liquids as directed. You may need to limit the amount of liquid you drink to help control your urine leakage. Do not drink any liquid right before you go to bed. Limit or do not have drinks that contain caffeine or alcohol.   Prevent constipation.  Eat a variety of high-fiber foods. Good examples are high-fiber cereals, beans, vegetables, and whole-grain breads. Walking is the best way to trigger your intestines to have a bowel movement.  Exercise regularly and maintain a healthy weight.  Weight loss and exercise will decrease pressure on your bladder and help you control your leakage.   Use a catheter as directed  to help empty your bladder. A catheter is a tiny, plastic tube that is put into your bladder to drain your urine.   Go to behavior therapy as directed.  Behavior therapy may be used to help you learn to control your urge to urinate.    Weight Management   Why it is important to manage your weight:  Being overweight increases your risk of health conditions such as heart disease, high blood pressure, type 2 diabetes, and certain types of cancer. It can also increase your risk for osteoarthritis, sleep apnea, and other respiratory problems. Aim  for a slow, steady weight loss. Even a small amount of weight loss can lower your risk of health problems.  How to lose weight safely:  A safe and healthy way to lose weight is to eat fewer calories and get regular exercise. You can lose up about 1 pound a week by decreasing the number of calories you eat by 500 calories each day.   Healthy meal plan for weight management:  A healthy meal plan includes a variety of foods, contains fewer calories, and helps you stay healthy. A healthy meal plan includes the following:  Eat whole-grain foods more often.  A healthy meal plan should contain fiber. Fiber is the part of grains, fruits, and vegetables that is not broken down by your body. Whole-grain foods are healthy and provide extra fiber in your diet. Some examples of whole-grain foods are whole-wheat breads and pastas, oatmeal, brown rice, and bulgur.  Eat a variety of vegetables every day.  Include dark, leafy greens such as spinach, kale, umesh greens, and mustard greens. Eat yellow and orange vegetables such as carrots, sweet potatoes, and winter squash.   Eat a variety of fruits every day.  Choose fresh or canned fruit (canned in its own juice or light syrup) instead of juice. Fruit juice has very little or no fiber.  Eat low-fat dairy foods.  Drink fat-free (skim) milk or 1% milk. Eat fat-free yogurt and low-fat cottage cheese. Try low-fat cheeses such as mozzarella and other reduced-fat cheeses.  Choose meat and other protein foods that are low in fat.  Choose beans or other legumes such as split peas or lentils. Choose fish, skinless poultry (chicken or turkey), or lean cuts of red meat (beef or pork). Before you cook meat or poultry, cut off any visible fat.   Use less fat and oil.  Try baking foods instead of frying them. Add less fat, such as margarine, sour cream, regular salad dressing and mayonnaise to foods. Eat fewer high-fat foods. Some examples of high-fat foods include french fries, doughnuts, ice  cream, and cakes.  Eat fewer sweets.  Limit foods and drinks that are high in sugar. This includes candy, cookies, regular soda, and sweetened drinks.  Exercise:  Exercise at least 30 minutes per day on most days of the week. Some examples of exercise include walking, biking, dancing, and swimming. You can also fit in more physical activity by taking the stairs instead of the elevator or parking farther away from stores. Ask your healthcare provider about the best exercise plan for you.      © Copyright [employer] 2018 Information is for End User's use only and may not be sold, redistributed or otherwise used for commercial purposes. All illustrations and images included in CareNotes® are the copyrighted property of [employer] Inc[employer] or [employer]

## 2024-02-17 LAB
ALBUMIN SERPL-MCNC: 4.5 G/DL (ref 3.9–4.9)
ALBUMIN/GLOB SERPL: 2.4 {RATIO} (ref 1.2–2.2)
ALP SERPL-CCNC: 83 IU/L (ref 44–121)
ALT SERPL-CCNC: 37 IU/L (ref 0–32)
AST SERPL-CCNC: 28 IU/L (ref 0–40)
BASOPHILS # BLD AUTO: 0 X10E3/UL (ref 0–0.2)
BASOPHILS NFR BLD AUTO: 1 %
BILIRUB SERPL-MCNC: 0.7 MG/DL (ref 0–1.2)
BUN SERPL-MCNC: 11 MG/DL (ref 8–27)
BUN/CREAT SERPL: 15 (ref 12–28)
CALCIUM SERPL-MCNC: 9.4 MG/DL (ref 8.7–10.3)
CHLORIDE SERPL-SCNC: 104 MMOL/L (ref 96–106)
CHOLEST SERPL-MCNC: 186 MG/DL (ref 100–199)
CHOLEST/HDLC SERPL: 3 RATIO (ref 0–4.4)
CO2 SERPL-SCNC: 20 MMOL/L (ref 20–29)
CREAT SERPL-MCNC: 0.71 MG/DL (ref 0.57–1)
EGFR: 93 ML/MIN/1.73
EOSINOPHIL # BLD AUTO: 0.2 X10E3/UL (ref 0–0.4)
EOSINOPHIL NFR BLD AUTO: 3 %
ERYTHROCYTE [DISTWIDTH] IN BLOOD BY AUTOMATED COUNT: 11.8 % (ref 11.7–15.4)
EST. AVERAGE GLUCOSE BLD GHB EST-MCNC: 120 MG/DL
GLOBULIN SER-MCNC: 1.9 G/DL (ref 1.5–4.5)
GLUCOSE SERPL-MCNC: 108 MG/DL (ref 70–99)
HBA1C MFR BLD: 5.8 % (ref 4.8–5.6)
HCT VFR BLD AUTO: 38.7 % (ref 34–46.6)
HDLC SERPL-MCNC: 62 MG/DL
HGB BLD-MCNC: 13.1 G/DL (ref 11.1–15.9)
IMM GRANULOCYTES # BLD: 0 X10E3/UL (ref 0–0.1)
IMM GRANULOCYTES NFR BLD: 1 %
LDLC SERPL CALC-MCNC: 86 MG/DL (ref 0–99)
LYMPHOCYTES # BLD AUTO: 1.9 X10E3/UL (ref 0.7–3.1)
LYMPHOCYTES NFR BLD AUTO: 31 %
MCH RBC QN AUTO: 32 PG (ref 26.6–33)
MCHC RBC AUTO-ENTMCNC: 33.9 G/DL (ref 31.5–35.7)
MCV RBC AUTO: 94 FL (ref 79–97)
MONOCYTES # BLD AUTO: 0.6 X10E3/UL (ref 0.1–0.9)
MONOCYTES NFR BLD AUTO: 10 %
NEUTROPHILS # BLD AUTO: 3.3 X10E3/UL (ref 1.4–7)
NEUTROPHILS NFR BLD AUTO: 54 %
PLATELET # BLD AUTO: 155 X10E3/UL (ref 150–450)
POTASSIUM SERPL-SCNC: 4.4 MMOL/L (ref 3.5–5.2)
PROT SERPL-MCNC: 6.4 G/DL (ref 6–8.5)
RBC # BLD AUTO: 4.1 X10E6/UL (ref 3.77–5.28)
SL AMB VLDL CHOLESTEROL CALC: 38 MG/DL (ref 5–40)
SODIUM SERPL-SCNC: 142 MMOL/L (ref 134–144)
TRIGL SERPL-MCNC: 229 MG/DL (ref 0–149)
TSH SERPL DL<=0.005 MIU/L-ACNC: 0.21 UIU/ML (ref 0.45–4.5)
WBC # BLD AUTO: 6.1 X10E3/UL (ref 3.4–10.8)

## 2024-02-20 DIAGNOSIS — E03.9 ACQUIRED HYPOTHYROIDISM: ICD-10-CM

## 2024-02-20 NOTE — TELEPHONE ENCOUNTER
Reason for call:   [x] Refill   [] Prior Auth  [] Other:     Office:   [x] PCP/Provider - Joshua Vazquez MD    [] Specialty/Provider -       Medication  levothyroxine   Dose: 175 mcg Route: Oral Frequency: Daily (early morning)  Dispense Quantity: 90 tablet patient reports taking a different dose lower then prescribed please review  Sig: Take 1 tablet (175 mcg total) by mouth daily in the early morning       Pharmacy  [facility] ([facility])    Does the patient have enough for 3 days?   [] Yes   [x] No - Send as HP to POD

## 2024-02-21 ENCOUNTER — TELEPHONE (OUTPATIENT)
Dept: FAMILY MEDICINE CLINIC | Facility: CLINIC | Age: 69
End: 2024-02-21

## 2024-02-21 RX ORDER — LEVOTHYROXINE SODIUM 175 UG/1
175 TABLET ORAL
Qty: 90 TABLET | Refills: 3 | Status: SHIPPED | OUTPATIENT
Start: 2024-02-21

## 2024-04-19 ENCOUNTER — OFFICE VISIT (OUTPATIENT)
Dept: OBGYN CLINIC | Facility: CLINIC | Age: 69
End: 2024-04-19
Payer: COMMERCIAL

## 2024-04-19 VITALS — BODY MASS INDEX: 37.28 KG/M2 | HEIGHT: 66 IN | WEIGHT: 232 LBS

## 2024-04-19 DIAGNOSIS — E66.01 OBESITY, MORBID (HCC): ICD-10-CM

## 2024-04-19 DIAGNOSIS — M70.62 GREATER TROCHANTERIC BURSITIS OF LEFT HIP: Primary | ICD-10-CM

## 2024-04-19 DIAGNOSIS — M16.12 PRIMARY OSTEOARTHRITIS OF ONE HIP, LEFT: ICD-10-CM

## 2024-04-19 PROCEDURE — 99213 OFFICE O/P EST LOW 20 MIN: CPT | Performed by: ORTHOPAEDIC SURGERY

## 2024-04-19 PROCEDURE — 20610 DRAIN/INJ JOINT/BURSA W/O US: CPT | Performed by: ORTHOPAEDIC SURGERY

## 2024-04-19 RX ORDER — KETOROLAC TROMETHAMINE 30 MG/ML
30 INJECTION, SOLUTION INTRAMUSCULAR; INTRAVENOUS
Status: COMPLETED | OUTPATIENT
Start: 2024-04-19 | End: 2024-04-19

## 2024-04-19 RX ORDER — BUPIVACAINE HYDROCHLORIDE 2.5 MG/ML
1 INJECTION, SOLUTION INFILTRATION; PERINEURAL
Status: COMPLETED | OUTPATIENT
Start: 2024-04-19 | End: 2024-04-19

## 2024-04-19 RX ORDER — METHYLPREDNISOLONE ACETATE 40 MG/ML
2 INJECTION, SUSPENSION INTRA-ARTICULAR; INTRALESIONAL; INTRAMUSCULAR; SOFT TISSUE
Status: COMPLETED | OUTPATIENT
Start: 2024-04-19 | End: 2024-04-19

## 2024-04-19 RX ADMIN — BUPIVACAINE HYDROCHLORIDE 1 ML: 2.5 INJECTION, SOLUTION INFILTRATION; PERINEURAL at 11:00

## 2024-04-19 RX ADMIN — METHYLPREDNISOLONE ACETATE 2 ML: 40 INJECTION, SUSPENSION INTRA-ARTICULAR; INTRALESIONAL; INTRAMUSCULAR; SOFT TISSUE at 11:00

## 2024-04-19 RX ADMIN — KETOROLAC TROMETHAMINE 30 MG: 30 INJECTION, SOLUTION INTRAMUSCULAR; INTRAVENOUS at 11:00

## 2024-04-19 NOTE — PROGRESS NOTES
Assessment:  1. Greater trochanteric bursitis of left hip        2. Primary osteoarthritis of one hip, left            Plan:    Patient with left hip pain due to osteoarthritis and greater troch bursitis   Weightbearing as tolerated   Patient received a left greater troch bursa steroid injection in the office today   Advised patient to monitor her symptoms after the injections where she feels improvement in her hip   She is to follow in 3-4 months         The above stated was discussed in layman's terms and the patient expressed understanding.  All questions were answered to the patient's satisfaction.         Subjective:   Lili M Mortimer is a 68 y.o. female who presents today follow for left hip pain due osteoarthritis and  greater troch bursitis. She had a left greater troch bursa steroid injection on 1/26/24 and relief until recently. 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 She is having pain over lateral left hip to the buttock region. She denies radiating leg pain.  He states she does have trouble bending forward to put on her shoes and socks at times.  She notes minimal pain laying on the left side.  Patient is taking Aleve as needed for pain relief.      Review of systems negative unless otherwise specified in HPI    Past Medical History:   Diagnosis Date    Anxiety     Closed fracture of distal radius and ulna, left, initial encounter     Last Assessed:  5/13/16    CPAP (continuous positive airway pressure) dependence     Depression     Disease of thyroid gland     Fibromyalgia     GERD (gastroesophageal reflux disease)     Hyperlipidemia     Sleep apnea     pt uses c-pap    Sleep apnea, obstructive     Squamous cell carcinoma of skin     Last Assessed:  1/17/17       Past Surgical History:   Procedure Laterality Date    BREAST EXCISIONAL BIOPSY  1988    doesnt remember which breast and cant see a scar    COLON SURGERY      COLONOSCOPY N/A 07/14/2016    Procedure: COLONOSCOPY WITH  ENDOCLIPS AND INJECTION FOR HEMOSTASIS;  Surgeon: Boyd Holt MD;  [facility];  Service:     COLONOSCOPY  2019    FL INJECTION LEFT HIP (NON ARTHROGRAM)  09/15/2023    FL INJECTION LEFT HIP (NON ARTHROGRAM)  2024    FRACTURE SURGERY      MOHS RECONSTRUCTION      NASAL FRACTURE SURGERY      SD OPTX DSTL RADL I-ARTIC FX/EPIPHYSL SEP 3 FRAG Left 2016    Procedure: OPEN REDUCTION W/ INTERNAL FIXATION (ORIF)DISTAL  RADIUS ;  Surgeon: Boyd Wagner MD;  Location: [facility];  Service: Orthopedics       Family History   Problem Relation Age of Onset    Cancer Mother         Liver cancer    Breast cancer Mother 55    Alcohol abuse Mother     Cancer Father         Colon    Colon cancer Father         Bladder cancer    Alcohol abuse Father     Cancer Sister     No Known Problems Daughter     No Known Problems Maternal Grandmother     No Known Problems Maternal Grandfather     No Known Problems Paternal Grandmother     No Known Problems Paternal Grandfather     No Known Problems Maternal Aunt     No Known Problems Paternal Aunt     No Known Problems Paternal Aunt     No Known Problems Paternal Aunt     No Known Problems Paternal Aunt     No Known Problems Paternal Aunt        Social History     Occupational History    Not on file   Tobacco Use    Smoking status: Former     Current packs/day: 0.00     Average packs/day: 1 pack/day for 30.0 years (30.0 ttl pk-yrs)     Types: Cigarettes     Start date: 1972     Quit date: 2002     Years since quittin.3     Passive exposure: Past    Smokeless tobacco: Never   Vaping Use    Vaping status: Never Used   Substance and Sexual Activity    Alcohol use: Not Currently    Drug use: No    Sexual activity: Not Currently     Partners: Male     Birth control/protection: Male Sterilization         Current Outpatient Medications:     buPROPion (WELLBUTRIN XL) 300 mg 24 hr tablet, Take 1 tablet (300 mg total) by mouth daily, Disp: 90 tablet, Rfl: 3     Calcium Carbonate-Vit D-Min (CALCIUM 1200 PO), Take by mouth daily, Disp: , Rfl:     cholecalciferol (VITAMIN D3) 25 mcg (1,000 units) tablet, , Disp: , Rfl:     FLUoxetine (PROzac) 20 mg capsule, Take 1 capsule (20 mg total) by mouth daily, Disp: 90 capsule, Rfl: 3    levothyroxine 175 mcg tablet, Take 1 tablet (175 mcg total) by mouth daily in the early morning, Disp: 90 tablet, Rfl: 3    Magnesium 200 MG TABS, Take 1 tablet by mouth daily, Disp: , Rfl:     Multiple Vitamins-Minerals (MULTIVITAMIN ADULT PO), Take 1 tablet by mouth daily, Disp: , Rfl:     Omega-3 Fatty Acids (FISH OIL) 1000 MG CPDR, Take 1 capsule by mouth daily, Disp: , Rfl:     pravastatin (PRAVACHOL) 20 mg tablet, Take 1 tablet (20 mg total) by mouth daily, Disp: 90 tablet, Rfl: 3    vitamin E 100 UNIT capsule, Take 1 capsule by mouth daily, Disp: , Rfl:     Current Facility-Administered Medications:     EPINEPHrine PF (ADRENALIN) 1 mg/mL injection 0.5 mg, 0.5 mg, Intramuscular, Once, Aida Urias MD    Allergies   Allergen Reactions    Erythromycin Shortness Of Breath     Reaction Date: 20Oct2011;     Cefprozil      Reaction Date: 20Oct2011;     Cephalosporins             Vitals:     Body mass index is 37.45 kg/m².  Wt Readings from Last 3 Encounters:   04/19/24 105 kg (232 lb)   02/14/24 102 kg (224 lb)   02/06/24 102 kg (224 lb)       Objective:            Physical Exam  Physical Exam:      General Appearance:    Alert, cooperative, no distress, appears stated age   Head:    Normocephalic, without obvious abnormality, atraumatic   Eyes:    conjunctiva/corneas clear, both eyes         Nose:   Nares normal, septum midline, no drainage    Throat:   Lips normal; teeth and gums normal   Neck:    symmetrical, trachea midline, ;     thyroid:  no enlargement/   Back:     Symmetric, no curvature, ROM normal   Lungs:   No audible wheezing or labored breathing   Chest Wall:    No tenderness or deformity    Heart:    Regular rate and rhythm            "              Pulses:   2+ and symmetric all extremities   Skin:   Skin color, texture, turgor normal, no rashes or lesions   Neurologic:   normal strength, sensation and reflexes     throughout                       Ortho Exam  Left hip  Skin intact  No erythema or open wounds  Negative Stinchfield  Negative  KEVIN test  +  FADDIR  + tenderness to palpation over the greater trochanteric region  Mild  pain with adduction or abduction against resistance   Neurovascularly Intact Distally        Diagnostics, reviewed and taken today if performed as documented:    No taken today       Procedures, if performed today:    Large joint arthrocentesis: L greater trochanteric bursa  Universal Protocol:  Consent: Verbal consent obtained.  Risks and benefits: risks, benefits and alternatives were discussed  Consent given by: patient  Time out: Immediately prior to procedure a \"time out\" was called to verify the correct patient, procedure, equipment, support staff and site/side marked as required.  Timeout called at: 4/19/2024 11:50 AM.  Patient understanding: patient states understanding of the procedure being performed  Site marked: the operative site was marked  Supporting Documentation  Indications: pain   Procedure Details  Location: hip - L greater trochanteric bursa  Needle size: 22 G  Approach: lateral  Medications administered: 1 mL bupivacaine 0.25 %; 2 mL methylPREDNISolone acetate 40 mg/mL; 30 mg ketorolac 30 mg/mL    Patient tolerance: patient tolerated the procedure well with no immediate complications  Dressing:  Sterile dressing applied            Scribe Attestation      I,:  Wale Herzog am acting as a scribe while in the presence of the attending physician.:       I,:  Hellen Bauer MD personally performed the services described in this documentation    as scribed in my presence.:               Portions of the record may have been created with voice recognition software.  Occasional wrong word or " "\"sound a like\" substitutions may have occurred due to the inherent limitations of voice recognition software.  Read the chart carefully and recognize, using context, where substitutions have occurred.  "

## 2024-07-19 ENCOUNTER — OFFICE VISIT (OUTPATIENT)
Dept: OBGYN CLINIC | Facility: CLINIC | Age: 69
End: 2024-07-19
Payer: COMMERCIAL

## 2024-07-19 VITALS
WEIGHT: 229 LBS | DIASTOLIC BLOOD PRESSURE: 77 MMHG | BODY MASS INDEX: 36.8 KG/M2 | HEIGHT: 66 IN | HEART RATE: 65 BPM | SYSTOLIC BLOOD PRESSURE: 118 MMHG

## 2024-07-19 DIAGNOSIS — M70.62 GREATER TROCHANTERIC BURSITIS OF LEFT HIP: Primary | ICD-10-CM

## 2024-07-19 PROCEDURE — 99213 OFFICE O/P EST LOW 20 MIN: CPT | Performed by: ORTHOPAEDIC SURGERY

## 2024-07-19 NOTE — PROGRESS NOTES
Orthopedics          Lili M Mortimer 68 y.o. female MRN: [id number]      Chief Complaint:   Left hip laterally based pain     HPI:   68 y.o.female complaining of left laterally based hip pain.  She has been diagnosed with greatere trochanteric bursitis.  She has had injection in the past with significant pain relief.  She states her pain is well controlled at this time.  She states she is back to most all activities without any pain or issues.                Review Of Systems:   Skin: Normal  Neuro: See HPI  Musculoskeletal: See HPI  All other systems reviewed and are negative    Past Medical History:   Past Medical History:   Diagnosis Date    Anxiety     Closed fracture of distal radius and ulna, left, initial encounter     Last Assessed:  5/13/16    CPAP (continuous positive airway pressure) dependence     Depression     Disease of thyroid gland     Fibromyalgia     GERD (gastroesophageal reflux disease)     Hyperlipidemia     Sleep apnea     pt uses c-pap    Sleep apnea, obstructive     Squamous cell carcinoma of skin     Last Assessed:  1/17/17       Past Surgical History:   Past Surgical History:   Procedure Laterality Date    BREAST EXCISIONAL BIOPSY  1988    doesnt remember which breast and cant see a scar    COLON SURGERY      COLONOSCOPY N/A 07/14/2016    Procedure: COLONOSCOPY WITH ENDOCLIPS AND INJECTION FOR HEMOSTASIS;  Surgeon: Boyd Holt MD;  [facility];  Service:     COLONOSCOPY  08/2019    FL INJECTION LEFT HIP (NON ARTHROGRAM)  09/15/2023    FL INJECTION LEFT HIP (NON ARTHROGRAM)  01/03/2024    FRACTURE SURGERY      MOHS RECONSTRUCTION      NASAL FRACTURE SURGERY      NE OPTX DSTL RADL I-ARTIC FX/EPIPHYSL SEP 3 FRAG Left 02/25/2016    Procedure: OPEN REDUCTION W/ INTERNAL FIXATION (ORIF)DISTAL  RADIUS ;  Surgeon: Boyd Wagner MD;  Location[facility];  Service: Orthopedics       Family History:  Family history reviewed and non-contributory  Family History   Problem Relation  Age of Onset    Cancer Mother         Liver cancer    Breast cancer Mother 55    Alcohol abuse Mother     Cancer Father         Colon    Colon cancer Father         Bladder cancer    Alcohol abuse Father     Cancer Sister     No Known Problems Daughter     No Known Problems Maternal Grandmother     No Known Problems Maternal Grandfather     No Known Problems Paternal Grandmother     No Known Problems Paternal Grandfather     No Known Problems Maternal Aunt     No Known Problems Paternal Aunt     No Known Problems Paternal Aunt     No Known Problems Paternal Aunt     No Known Problems Paternal Aunt     No Known Problems Paternal Aunt          Social History:  Social History     Socioeconomic History    Marital status: /Civil Union     Spouse name: None    Number of children: None    Years of education: None    Highest education level: None   Occupational History    None   Tobacco Use    Smoking status: Former     Current packs/day: 0.00     Average packs/day: 1 pack/day for 30.0 years (30.0 ttl pk-yrs)     Types: Cigarettes     Start date: 1972     Quit date: 2002     Years since quittin.5     Passive exposure: Past    Smokeless tobacco: Never   Vaping Use    Vaping status: Never Used   Substance and Sexual Activity    Alcohol use: Not Currently    Drug use: No    Sexual activity: Not Currently     Partners: Male     Birth control/protection: Male Sterilization   Other Topics Concern    None   Social History Narrative    2 cups of coffee daily     Social Determinants of Health     Financial Resource Strain: Low Risk  (2024)    Overall Financial Resource Strain (CARDIA)     Difficulty of Paying Living Expenses: Not hard at all   Food Insecurity: Not on file   Transportation Needs: No Transportation Needs (2024)    PRAPARE - Transportation     Lack of Transportation (Medical): No     Lack of Transportation (Non-Medical): No   Physical Activity: Not on file   Stress: Not on file   Social  Connections: Not on file   Intimate Partner Violence: Not on file   Housing Stability: Not on file       Allergies:   Allergies   Allergen Reactions    Erythromycin Shortness Of Breath     Reaction Date: 20Oct2011;     Cefprozil      Reaction Date: 20Oct2011;     Cephalosporins        Labs:  0   Lab Value Date/Time    HCT 38.7 02/16/2024 0733    HCT 40.8 02/14/2023 0855    HCT 39.9 03/28/2022 0746    HCT 42.4 02/16/2017 0919    HCT 39.1 01/20/2017 0802    HCT 28.1 (L) 07/15/2016 0504    HCT 29.1 (L) 07/15/2016 0202    HCT 27 (L) 07/14/2016 2208    HGB 13.1 02/16/2024 0733    HGB 13.9 02/14/2023 0855    HGB 13.2 03/28/2022 0746    HGB 13.6 02/16/2017 0919    HGB 13.0 01/20/2017 0802    HGB 9.4 (L) 07/15/2016 0504    HGB 9.7 (L) 07/15/2016 0202    HGB 9.2 (L) 07/14/2016 2208    WBC 6.1 02/16/2024 0733    WBC 6.9 02/14/2023 0855    WBC 7.8 03/28/2022 0746    WBC 8.65 02/16/2017 0919    WBC 6.3 01/20/2017 0802    WBC 7.37 07/15/2016 0504       Meds:    Current Outpatient Medications:     buPROPion (WELLBUTRIN XL) 300 mg 24 hr tablet, Take 1 tablet (300 mg total) by mouth daily, Disp: 90 tablet, Rfl: 3    Calcium Carbonate-Vit D-Min (CALCIUM 1200 PO), Take by mouth daily, Disp: , Rfl:     cholecalciferol (VITAMIN D3) 25 mcg (1,000 units) tablet, , Disp: , Rfl:     FLUoxetine (PROzac) 20 mg capsule, Take 1 capsule (20 mg total) by mouth daily, Disp: 90 capsule, Rfl: 3    levothyroxine 175 mcg tablet, Take 1 tablet (175 mcg total) by mouth daily in the early morning, Disp: 90 tablet, Rfl: 3    Magnesium 200 MG TABS, Take 1 tablet by mouth daily, Disp: , Rfl:     Multiple Vitamins-Minerals (MULTIVITAMIN ADULT PO), Take 1 tablet by mouth daily, Disp: , Rfl:     Omega-3 Fatty Acids (FISH OIL) 1000 MG CPDR, Take 1 capsule by mouth daily, Disp: , Rfl:     pravastatin (PRAVACHOL) 20 mg tablet, Take 1 tablet (20 mg total) by mouth daily, Disp: 90 tablet, Rfl: 3    vitamin E 100 UNIT capsule, Take 1 capsule by mouth daily, Disp: ,  Rfl:     Current Facility-Administered Medications:     EPINEPHrine PF (ADRENALIN) 1 mg/mL injection 0.5 mg, 0.5 mg, Intramuscular, Once, Aida Urias MD    Body mass index is 36.96 kg/m².  Wt Readings from Last 3 Encounters:   07/19/24 104 kg (229 lb)   04/19/24 105 kg (232 lb)   02/14/24 102 kg (224 lb)     Physical Exam:   Vitals:    07/19/24 0920   BP: 118/77   Pulse: 65       General Appearance:    Alert, cooperative, no distress, appears stated age   Head:    Normocephalic, without obvious abnormality, atraumatic   Eyes:    conjunctiva/corneas clear, both eyes        Nose:   Nares normal, septum midline, no drainage    Throat:   Lips normal; teeth and gums normal   Neck:    symmetrical, trachea midline, ;     thyroid:  no enlargement/   Back:     Symmetric, no curvature, ROM normal   Lungs:   No audible wheezing or labored breathing   Chest Wall:    No tenderness or deformity    Heart:    Regular rate and rhythm               Pulses:   2+ and symmetric all extremities   Skin:   Skin color, texture, turgor normal, no rashes or lesions   Neurologic:   normal strength, sensation and reflexes     throughout       Musculoskeletal: left lower extremity  On examination of the patient's left hip no effusion, no erythema.  Pain on palpation of the left hip greater trochanteric region.  No pain with hip flexion, internal or external motion. Hip flexion adduction, abduction strength 5/5.  Sensation intact distal pulse present.        _*_*_*_*_*_*_*_*_*_*_*_*_*_*_*_*_*_*_*_*_*_*_*_*_*_*_*_*_*_*_*_*_*_*_*_*_*_*_*_*_*    Assessment:  68 y.o.female with left hip greater trochanteric bursitis, left hip arthritis with minimal pain    Plan:   Weight bearing as tolerated  left lower extremity  Provided home ROM and strengthening exercises  OTC medication as needed for pain control  Advised patient she may get a repeat intra articular or greater trochanteric injection as needed  Follow up in 3 months or sooner if  needed      Irving Moreno PA-C

## 2024-07-23 ENCOUNTER — TELEPHONE (OUTPATIENT)
Age: 69
End: 2024-07-23

## 2024-07-23 DIAGNOSIS — L50.9 URTICARIA: Primary | ICD-10-CM

## 2024-07-23 RX ORDER — METHYLPREDNISOLONE 4 MG/1
TABLET ORAL
Qty: 21 EACH | Refills: 0 | Status: SHIPPED | OUTPATIENT
Start: 2024-07-23

## 2024-07-23 NOTE — TELEPHONE ENCOUNTER
Spoke with patient, she states she gets these outbreak of hives every year, She is requesting Rx Methylprednisolone 4 mg sent to [facility]. Call patient when Rx sent to pharmacy.

## 2024-07-23 NOTE — TELEPHONE ENCOUNTER
Patient called the RX Refill Line. Message is being forwarded to the office.     Patient is requesting - Pt called to request a refill for methylPREDNISolone 4 MG. Pt states she has a hives outbreak and she is completely out of this med. Pt was informed med not active but a request was being send to office as a HP. Please review, Thank you.      Please contact patient at - [phone]

## 2024-08-07 ENCOUNTER — OFFICE VISIT (OUTPATIENT)
Dept: FAMILY MEDICINE CLINIC | Facility: CLINIC | Age: 69
End: 2024-08-07
Payer: COMMERCIAL

## 2024-08-07 VITALS
TEMPERATURE: 97.6 F | BODY MASS INDEX: 36.96 KG/M2 | OXYGEN SATURATION: 96 % | WEIGHT: 230 LBS | HEART RATE: 74 BPM | HEIGHT: 66 IN | RESPIRATION RATE: 18 BRPM | DIASTOLIC BLOOD PRESSURE: 74 MMHG | SYSTOLIC BLOOD PRESSURE: 116 MMHG

## 2024-08-07 DIAGNOSIS — G47.33 OSA ON CPAP: ICD-10-CM

## 2024-08-07 DIAGNOSIS — E66.01 OBESITY, MORBID (HCC): Primary | ICD-10-CM

## 2024-08-07 DIAGNOSIS — E03.9 ACQUIRED HYPOTHYROIDISM: ICD-10-CM

## 2024-08-07 PROCEDURE — G2211 COMPLEX E/M VISIT ADD ON: HCPCS

## 2024-08-07 PROCEDURE — 99214 OFFICE O/P EST MOD 30 MIN: CPT

## 2024-08-07 RX ORDER — SEMAGLUTIDE 0.25 MG/.5ML
0.25 INJECTION, SOLUTION SUBCUTANEOUS WEEKLY
Qty: 2 ML | Refills: 0 | Status: SHIPPED | OUTPATIENT
Start: 2024-08-07

## 2024-08-07 NOTE — ASSESSMENT & PLAN NOTE
Weight today: 230 lbs (104 kg), increased 6 lbs since last visit 2/2024; BMI 37.12  Has prediabetes currently managed with lifestyle management, A1c 5.8  Was previously following with weight management team, last visit 3/2023. Not interested in bariatric surgery or reestablishing care with weight management.      Plan:  Rx Wegovy 2.5 weekly x 4 weeks, side effects reviewed. Patient denies any personal or family history of thyroid cancer, pancreatitis, gallstone/gallbladder issues. Discussed that this may be difficult to secure due to national shortage.  Encourage increasing activity - 150 minutes/weekly of moderate intensity exercise. 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 Recommend continuing diet changes, limit/avoid grazing on foods throughout the day, increase hydration.  Reviewed reasonable expectations for weight loss with medication and lifestyle modifications.  If unable to secure wegovy, consider oral therapy which patient is agreeable to.  Follow up 4 weeks after starting medication. Pt understands and agrees to plan as above.

## 2024-08-07 NOTE — ASSESSMENT & PLAN NOTE
Recommend patient reestablish/follow-up with sleep medicine for persistent issues.  Encourage use of CPAP nightly.  Lungs CTAB on exam today.

## 2024-08-07 NOTE — ASSESSMENT & PLAN NOTE
Current regimen: Levothyroxine 175 mcg 5 days a week and half dose on the weekends per patient  Last labs 2/2023, TSH 0.207 - overactive

## 2024-08-07 NOTE — PROGRESS NOTES
Ambulatory Visit  Name: Lili M Mortimer      : 1955      MRN: [id number]  Encounter Provider: Josh Flores DO  Encounter Date: 2024   Encounter department: [facility]    Assessment & Plan   1. Obesity, morbid (HCC)  Assessment & Plan:  Weight today: 230 lbs (104 kg), increased 6 lbs since last visit 2024; BMI 37.12  Has prediabetes currently managed with lifestyle management, A1c 5.8  Was previously following with weight management team, last visit 3/2023. Not interested in bariatric surgery or reestablishing care with weight management.      Plan:  Rx Wegovy 2.5 weekly x 4 weeks, side effects reviewed. Patient denies any personal or family history of thyroid cancer, pancreatitis, gallstone/gallbladder issues. Discussed that this may be difficult to secure due to national shortage.  Encourage increasing activity - 150 minutes/weekly of moderate intensity exercise. 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 Recommend continuing diet changes, limit/avoid grazing on foods throughout the day, increase hydration.  Reviewed reasonable expectations for weight loss with medication and lifestyle modifications.  If unable to secure wegovy, consider oral therapy which patient is agreeable to.  Follow up 4 weeks after starting medication. Pt understands and agrees to plan as above.    Orders:  -     Semaglutide-Weight Management (Wegovy) 0.25 MG/0.5ML; Inject 0.5 mL (0.25 mg total) under the skin once a week Inject 0.25 mg under the skin weekly  2. Acquired hypothyroidism  Assessment & Plan:  Current regimen: Levothyroxine 175 mcg 5 days a week and half dose on the weekends per patient  Last labs 2023, TSH 0.207 - overactive  Orders:  -     TSH, 3rd generation with Free T4 reflex; Future  -     TSH, 3rd generation with Free T4 reflex  3. DIONE on CPAP  Assessment & Plan:  Recommend patient reestablish/follow-up with sleep medicine for persistent issues.  Encourage  use of CPAP nightly.  Lungs CTAB on exam today.       History of Present Illness     HPI    67 yo F presents for discussion about breathing issues and weight management.  Patient has a history of DIONE currently on CPAP nightly, follow-up with sleep medicine team and last saw Dr. Carter 1/2022.  She reports that she has intermittent episodes of difficulty breathing at night even with the CPAP with some associated intermittent palpitations.  Does report some dyspnea on exertion but reports that she can walk up a flight of stairs without issues if she goes slowly.    She is concerned about her weight gain over the last year or so.  Weight today is 230 pounds, increased by 6 pounds since last visit back in February.  Previously followed with pain management team, last seen 3/2023, was on a healthy core plan.  Has no interest in bariatric surgery and does not want to be seen by weight management again at this time.  Has previously tried weight watchers and other diet plans.  She reports that she does eat a lot of fruits and veggies but also tends to graze throughout the day with unhealthy snacks.  She does report development of recent hip problems, following with Ortho and receiving injections with significant improvement.  Denies any alcohol use.    Recent labs reviewed, TSH overactive.  Patient states that she is currently taking levothyroxine 175 mcg 5 days a week and half a dose on the weekends.  Reports needing to use Ex-Lax 3 times a week to have regular bowel movements.    Review of Systems   Constitutional:  Negative for chills and fever.   HENT:  Negative for congestion and rhinorrhea.    Eyes:  Negative for visual disturbance.   Respiratory:  Negative for cough and shortness of breath.         Breathing discomfort    Cardiovascular:  Negative for chest pain and palpitations.   Gastrointestinal:  Positive for constipation. Negative for abdominal pain, diarrhea, nausea and vomiting.   Genitourinary:  Negative for  "dysuria.   Neurological:  Negative for dizziness, light-headedness and headaches.     Current Outpatient Medications on File Prior to Visit   Medication Sig Dispense Refill    buPROPion (WELLBUTRIN XL) 300 mg 24 hr tablet Take 1 tablet (300 mg total) by mouth daily 90 tablet 3    Calcium Carbonate-Vit D-Min (CALCIUM 1200 PO) Take by mouth daily      cholecalciferol (VITAMIN D3) 25 mcg (1,000 units) tablet       FLUoxetine (PROzac) 20 mg capsule Take 1 capsule (20 mg total) by mouth daily 90 capsule 3    levothyroxine 175 mcg tablet Take 1 tablet (175 mcg total) by mouth daily in the early morning 90 tablet 3    Magnesium 200 MG TABS Take 1 tablet by mouth daily      methylPREDNISolone 4 MG tablet therapy pack Use as directed on package 21 each 0    Multiple Vitamins-Minerals (MULTIVITAMIN ADULT PO) Take 1 tablet by mouth daily      Omega-3 Fatty Acids (FISH OIL) 1000 MG CPDR Take 1 capsule by mouth daily      pravastatin (PRAVACHOL) 20 mg tablet Take 1 tablet (20 mg total) by mouth daily 90 tablet 3    vitamin E 100 UNIT capsule Take 1 capsule by mouth daily       Current Facility-Administered Medications on File Prior to Visit   Medication Dose Route Frequency Provider Last Rate Last Admin    EPINEPHrine PF (ADRENALIN) 1 mg/mL injection 0.5 mg  0.5 mg Intramuscular Once Aida Urias MD          Objective     /74 (BP Location: Left arm, Patient Position: Sitting, Cuff Size: Large)   Pulse 74   Temp 97.6 °F (36.4 °C)   Resp 18   Ht 5' 6\" (1.676 m)   Wt 104 kg (230 lb)   SpO2 96%   BMI 37.12 kg/m²     Physical Exam  Vitals reviewed.   Constitutional:       Appearance: She is obese.   HENT:      Head: Normocephalic and atraumatic.      Right Ear: External ear normal.      Left Ear: External ear normal.      Nose: Nose normal.      Mouth/Throat:      Pharynx: Oropharynx is clear.   Eyes:      Extraocular Movements: Extraocular movements intact.      Conjunctiva/sclera: Conjunctivae normal. "   Cardiovascular:      Rate and Rhythm: Normal rate and regular rhythm.      Pulses: Normal pulses.      Heart sounds: Normal heart sounds.   Pulmonary:      Effort: Pulmonary effort is normal. No respiratory distress.      Breath sounds: Normal breath sounds. No wheezing, rhonchi or rales.   Abdominal:      Palpations: Abdomen is soft.   Musculoskeletal:      Cervical back: Neck supple.      Right lower leg: No edema.      Left lower leg: No edema.   Skin:     General: Skin is warm.   Neurological:      Mental Status: She is alert and oriented to person, place, and time.   Psychiatric:         Mood and Affect: Mood normal.         Behavior: Behavior normal.       Administrative Statements   I have spent a total time of 40 minutes in caring for this patient on the day of the visit/encounter including Risks and benefits of tx options, Instructions for management, Patient and family education, Importance of tx compliance, Risk factor reductions, Counseling / Coordination of care, Documenting in the medical record, Reviewing / ordering tests, medicine, procedures  , and Obtaining or reviewing history  .

## 2024-08-09 ENCOUNTER — TELEPHONE (OUTPATIENT)
Dept: FAMILY MEDICINE CLINIC | Facility: CLINIC | Age: 69
End: 2024-08-09

## 2024-08-10 LAB — TSH SERPL DL<=0.005 MIU/L-ACNC: 0.52 UIU/ML (ref 0.45–4.5)

## 2024-08-20 NOTE — TELEPHONE ENCOUNTER
Patient called in to follow up on status of prior authorization. Patient stated she was told they were waiting for provider to fill out prior auth paperwork. Prior auth forms are scanned in media tab on second pages states it is required for provider to fill out paperwork. Does provider need to fill this out for approval. Please route back to prior auth pod when complete or if provider is not responsible for filling out forms.    Please advise, thank you

## 2024-08-22 ENCOUNTER — TELEPHONE (OUTPATIENT)
Dept: FAMILY MEDICINE CLINIC | Facility: CLINIC | Age: 69
End: 2024-08-22

## 2024-08-22 NOTE — TELEPHONE ENCOUNTER
Hello we received a form for a prior authorization for the medication Wegovy 0.25mg /0.5ML auto-injectors  Please initiate prior authorization  Thank you

## 2024-08-23 NOTE — TELEPHONE ENCOUNTER
PA for Semaglutide-Weight Management (Wegovy) 0.25 MG/0.5ML APPROVED     Date(s) approved 8- - 12-        Patient advised by          [x] Runcomhart Message  [] Phone call   []LMOM  []L/M to call office as no active Communication consent on file  []Unable to leave detailed message as VM not approved on Communication consent       Pharmacy advised by    [x]Fax  []Phone call    Approval letter scanned into Media Yes

## 2024-08-23 NOTE — TELEPHONE ENCOUNTER
PA for Wegovy 0.25 mg/0.5 ml SUBMITTED     via    []CMM-KEY:   [x][place]ania-Case ID # [id number]   []Faxed to plan   []Other website   []Phone call Case ID #     Office notes sent, clinical questions answered. Awaiting determination    Turnaround time for your insurance to make a decision on your Prior Authorization can take 7-21 business days.

## 2024-08-23 NOTE — TELEPHONE ENCOUNTER
Duplicate encounter created, please see telephone encounter from 8-9-2024 regarding WEGOVY PA status. Please review patient's chart to see if there is already an encounter regarding the medication in question and to document anything regarding this medication in regards to anything regarding the authorization process etc before creating another encounter Thank You.

## 2024-08-26 ENCOUNTER — RA CDI HCC (OUTPATIENT)
Dept: OTHER | Facility: HOSPITAL | Age: 69
End: 2024-08-26

## 2024-09-04 ENCOUNTER — TELEPHONE (OUTPATIENT)
Age: 69
End: 2024-09-04

## 2024-09-04 ENCOUNTER — OFFICE VISIT (OUTPATIENT)
Dept: FAMILY MEDICINE CLINIC | Facility: CLINIC | Age: 69
End: 2024-09-04
Payer: COMMERCIAL

## 2024-09-04 VITALS
HEART RATE: 75 BPM | OXYGEN SATURATION: 95 % | TEMPERATURE: 97 F | WEIGHT: 226 LBS | HEIGHT: 66 IN | RESPIRATION RATE: 16 BRPM | SYSTOLIC BLOOD PRESSURE: 112 MMHG | DIASTOLIC BLOOD PRESSURE: 80 MMHG | BODY MASS INDEX: 36.32 KG/M2

## 2024-09-04 DIAGNOSIS — K76.0 FATTY LIVER: ICD-10-CM

## 2024-09-04 DIAGNOSIS — E78.2 MIXED HYPERLIPIDEMIA: ICD-10-CM

## 2024-09-04 DIAGNOSIS — R73.03 PREDIABETES: Primary | ICD-10-CM

## 2024-09-04 DIAGNOSIS — G47.33 OSA ON CPAP: ICD-10-CM

## 2024-09-04 DIAGNOSIS — Z12.31 SCREENING MAMMOGRAM FOR BREAST CANCER: Primary | ICD-10-CM

## 2024-09-04 DIAGNOSIS — E66.01 OBESITY, MORBID (HCC): ICD-10-CM

## 2024-09-04 PROCEDURE — 1160F RVW MEDS BY RX/DR IN RCRD: CPT | Performed by: FAMILY MEDICINE

## 2024-09-04 PROCEDURE — 3725F SCREEN DEPRESSION PERFORMED: CPT | Performed by: FAMILY MEDICINE

## 2024-09-04 PROCEDURE — 99213 OFFICE O/P EST LOW 20 MIN: CPT | Performed by: FAMILY MEDICINE

## 2024-09-04 PROCEDURE — G2211 COMPLEX E/M VISIT ADD ON: HCPCS | Performed by: FAMILY MEDICINE

## 2024-09-04 PROCEDURE — 1159F MED LIST DOCD IN RCRD: CPT | Performed by: FAMILY MEDICINE

## 2024-09-04 RX ORDER — SEMAGLUTIDE 0.5 MG/.5ML
INJECTION, SOLUTION SUBCUTANEOUS
Qty: 2 ML | Refills: 0 | Status: SHIPPED | OUTPATIENT
Start: 2024-09-04 | End: 2024-10-02

## 2024-09-04 NOTE — TELEPHONE ENCOUNTER
Patient calling to request script for Mammogram. Reviewed yearly schedule for 10/22 but note on appt to r/s. Spoke with Jing in the office. Dr. Falcon in OR at time of appointment. Patient rescheduled for 10/24. Mammogram order per care standard. Message to Dr. Falcon as ALEKS.

## 2024-09-04 NOTE — PROGRESS NOTES
Ambulatory Visit  Name: Lili M Mortimer      : 1955      MRN: [id number]  Encounter Provider: Joshua Vazquez MD  Encounter Date: 2024   Encounter department: [facility]    Assessment & Plan   1. Prediabetes  -     Comprehensive metabolic panel  -     Hemoglobin A1C  2. Mixed hyperlipidemia  -     Lipid panel  3. Fatty liver  -     Hepatitis C antibody; Future  -     CBC and differential  -     Hepatitis C antibody  4. Obesity, morbid (HCC)  -     Semaglutide-Weight Management (Wegovy) 0.5 MG/0.5ML; Inject 0.5 mg under the skin weekly  5. DIONE on CPAP    Continue to titrate dose Q 4 weeks. Side effects reviewed. Advised to call if any problems      Repeat labs        History of Present Illness       Follow up OV to review medication. She was started on Wegovy last month. She is tolerating 0.25 mg dose w/out side effects. 4 lb weight loss from 2024 Part time  at the        Hyperlipidemia mixed type on Pravastatin 20 mg daily. 2024  lipid profile cholesterol 186. Triglycerides 220. HDL 62. LDL 86.      Prediabetes 2024 . A1c 5.8        Lab Results   Component Value Date    [id number] 1.130 2017    TSH 0.519 2024     Lab Results   Component Value Date    WBC 6.1 2024    HGB 13.1 2024    HCT 38.7 2024    MCV 94 2024     2024     Lab Results   Component Value Date     2017    SODIUM 142 2024    K 4.4 2024     2024    CO2 20 2024    ANIONGAP 11 04/15/2014    AGAP 6 2020    BUN 11 2024    CREATININE 0.71 2024    GLUC 108 (H) 2024    CALCIUM 9.3 2020    AST 28 2024    ALT 37 (H) 2024    ALKPHOS 80 2020    PROT 6.8 2017    TP 6.4 2024    BILITOT 0.5 2017    TBILI 0.7 2024    EGFR 93 2024     Lab Results   Component Value Date    CHOLESTEROL 186 2024    CHOLESTEROL 181 2023     CHOLESTEROL 159 03/28/2022     Lab Results   Component Value Date    HDL 62 02/16/2024    HDL 54 02/14/2023    HDL 51 03/28/2022     Lab Results   Component Value Date    TRIG 229 (H) 02/16/2024    TRIG 239 (H) 02/14/2023    TRIG 165 (H) 03/28/2022     Lab Results   Component Value Date    LDLCALC 86 02/16/2024     Lab Results   Component Value Date    HGBA1C 5.8 (H) 02/16/2024             Review of Systems   Constitutional:             Respiratory:  Positive for apnea. Negative for shortness of breath.         DIONE on CPAP   Cardiovascular:  Negative for chest pain and palpitations.   Gastrointestinal:  Negative for abdominal pain, constipation, diarrhea, nausea and vomiting.        Hepatic steatosis. 02/2024 LFTs normal     History of colon polyps. Last colonoscopy 03/2023 . 03/2022 CT scan Extensive colonic diverticulosis.  + FH colon cancer       Lab Results       Component                Value               Date                       ALT                      37 (H)              02/16/2024                 AST                      28                  02/16/2024                 ALKPHOS                  80                  06/20/2020                 BILITOT                  0.5                 01/20/2017               Endocrine:        Hypothyroidism on Levothyroxine 175 mcg daily.     DEXA scan 03/2021 DEXA scan osteopenia left distal forearm.  Lumbar spine and femur demonstrate normal BMD.  Slight decrease in overall BMD from prior study 02/2019.  On MVI daily                           TSH                      0.519               08/09/2024                 Genitourinary:         GYN and pap smear 10/2021   Musculoskeletal:         S/p fracture left distal radius 02/2016 secondary to fall,   Skin:         s/p resection SCC right face followed by Dermatology. Chronic hives with prior episodes of swelling of lips and tongue. Prior allergist evaluation. Food allergy panel 07/2017 + shrimp. no reaction to shrimp.  Repeat allergy testing 2018 + trees, grass, dust and eggs. No improvement with Allegra. She has an Epi Pen.    Allergic/Immunologic: Positive for food allergies.   Neurological:  Negative for dizziness and headaches.   Psychiatric/Behavioral:  Positive for dysphoric mood. Negative for sleep disturbance and suicidal ideas. The patient is not nervous/anxious.         Longstanding history of depression stable on Wellbutrin  mg/day and Prozac 20 mg daily. History of alcoholism with prior in patient rehab and completion of a partial out program          Past Medical History:   Diagnosis Date    Anxiety     Closed fracture of distal radius and ulna, left, initial encounter     Last Assessed:  5/13/16    CPAP (continuous positive airway pressure) dependence     Depression     Disease of thyroid gland     Fibromyalgia     GERD (gastroesophageal reflux disease)     Hyperlipidemia     Sleep apnea     pt uses c-pap    Sleep apnea, obstructive     Squamous cell carcinoma of skin     Last Assessed:  1/17/17     Past Surgical History:   Procedure Laterality Date    BREAST EXCISIONAL BIOPSY  1988    doesnt remember which breast and cant see a scar    COLON SURGERY      COLONOSCOPY N/A 07/14/2016    Procedure: COLONOSCOPY WITH ENDOCLIPS AND INJECTION FOR HEMOSTASIS;  Surgeon: Boyd Holt MD;  Location: [facility];  Service:     COLONOSCOPY  08/2019    FL INJECTION LEFT HIP (NON ARTHROGRAM)  09/15/2023    FL INJECTION LEFT HIP (NON ARTHROGRAM)  01/03/2024    FRACTURE SURGERY      MOHS RECONSTRUCTION      NASAL FRACTURE SURGERY      KY OPTX DSTL RADL I-ARTIC FX/EPIPHYSL SEP 3 FRAG Left 02/25/2016    Procedure: OPEN REDUCTION W/ INTERNAL FIXATION (ORIF)DISTAL  RADIUS ;  Surgeon: Boyd Wagner MD;  Location: [facility];  Service: Orthopedics     Family History   Problem Relation Age of Onset    Cancer Mother         Liver cancer    Breast cancer Mother 55    Alcohol abuse Mother     Cancer Father         Colon    Colon  cancer Father         Bladder cancer    Alcohol abuse Father     Cancer Sister     No Known Problems Daughter     No Known Problems Maternal Grandmother     No Known Problems Maternal Grandfather     No Known Problems Paternal Grandmother     No Known Problems Paternal Grandfather     No Known Problems Maternal Aunt     No Known Problems Paternal Aunt     No Known Problems Paternal Aunt     No Known Problems Paternal Aunt     No Known Problems Paternal Aunt     No Known Problems Paternal Aunt      Social History     Tobacco Use    Smoking status: Former     Current packs/day: 0.00     Average packs/day: 1 pack/day for 30.0 years (30.0 ttl pk-yrs)     Types: Cigarettes     Start date: 1972     Quit date: 2002     Years since quittin.6     Passive exposure: Past    Smokeless tobacco: Never   Vaping Use    Vaping status: Never Used   Substance and Sexual Activity    Alcohol use: Not Currently    Drug use: No    Sexual activity: Not Currently     Partners: Male     Birth control/protection: Male Sterilization     Current Outpatient Medications on File Prior to Visit   Medication Sig    buPROPion (WELLBUTRIN XL) 300 mg 24 hr tablet Take 1 tablet (300 mg total) by mouth daily    Calcium Carbonate-Vit D-Min (CALCIUM 1200 PO) Take by mouth daily    cholecalciferol (VITAMIN D3) 25 mcg (1,000 units) tablet     FLUoxetine (PROzac) 20 mg capsule Take 1 capsule (20 mg total) by mouth daily    levothyroxine 175 mcg tablet Take 1 tablet (175 mcg total) by mouth daily in the early morning    Magnesium 200 MG TABS Take 1 tablet by mouth daily    methylPREDNISolone 4 MG tablet therapy pack Use as directed on package    Multiple Vitamins-Minerals (MULTIVITAMIN ADULT PO) Take 1 tablet by mouth daily    Omega-3 Fatty Acids (FISH OIL) 1000 MG CPDR Take 1 capsule by mouth daily    pravastatin (PRAVACHOL) 20 mg tablet Take 1 tablet (20 mg total) by mouth daily    vitamin E 100 UNIT capsule Take 1 capsule by mouth daily     "[DISCONTINUED] Semaglutide-Weight Management (Wegovy) 0.25 MG/0.5ML Inject 0.5 mL (0.25 mg total) under the skin once a week Inject 0.25 mg under the skin weekly     Allergies   Allergen Reactions    Erythromycin Shortness Of Breath     Reaction Date: 20Oct2011;     Cefprozil      Reaction Date: 20Oct2011;     Cephalosporins      Immunization History   Administered Date(s) Administered    COVID-19 PFIZER VACCINE 0.3 ML IM 03/21/2021, 04/11/2021, 11/23/2021, 04/28/2022, 12/16/2022    COVID-19 Pfizer Vac BIVALENT Remi-sucrose 12 Yr+ IM 12/16/2022    COVID-19 Pfizer vac (Remi-sucrose, gray cap) 12 yr+ IM 04/28/2022    INFLUENZA 10/06/2015, 10/21/2016, 10/02/2017, 10/29/2021, 09/26/2022, 10/15/2022, 10/10/2023    Influenza Quadrivalent, 6-35 Months IM 10/06/2015, 10/21/2016, 10/02/2017    Influenza, high dose seasonal 0.7 mL 10/29/2021    Influenza, injectable, quadrivalent, preservative free 0.5 mL 10/23/2018    Influenza, recombinant, quadrivalent,injectable, preservative free 09/16/2019, 10/05/2020    Influenza, seasonal, injectable 09/21/2012, 10/08/2013, 10/03/2014    Pneumococcal Polysaccharide PPV23 02/10/2022    Tdap 03/15/2013    Zoster Vaccine Recombinant 09/06/2019, 11/12/2019     Objective     /80 (BP Location: Left arm, Patient Position: Sitting, Cuff Size: Large)   Pulse 75   Temp (!) 97 °F (36.1 °C) (Temporal)   Resp 16   Ht 5' 6\" (1.676 m)   Wt 103 kg (226 lb)   SpO2 95%   BMI 36.48 kg/m²     BP Readings from Last 3 Encounters:   09/04/24 112/80   08/07/24 116/74   07/19/24 118/77     Wt Readings from Last 3 Encounters:   09/04/24 103 kg (226 lb)   08/07/24 104 kg (230 lb)   07/19/24 104 kg (229 lb)         Physical Exam  Constitutional:       General: She is not in acute distress.  Eyes:      General: No scleral icterus.  Neurological:      Mental Status: She is alert and oriented to person, place, and time.   Psychiatric:         Mood and Affect: Mood normal.         Behavior: Behavior " normal.

## 2024-09-06 LAB
ALBUMIN SERPL-MCNC: 4.6 G/DL (ref 3.9–4.9)
ALP SERPL-CCNC: 92 IU/L (ref 44–121)
ALT SERPL-CCNC: 44 IU/L (ref 0–32)
AST SERPL-CCNC: 34 IU/L (ref 0–40)
BASOPHILS # BLD AUTO: 0.1 X10E3/UL (ref 0–0.2)
BASOPHILS NFR BLD AUTO: 1 %
BILIRUB SERPL-MCNC: 0.5 MG/DL (ref 0–1.2)
BUN SERPL-MCNC: 13 MG/DL (ref 8–27)
BUN/CREAT SERPL: 20 (ref 12–28)
CALCIUM SERPL-MCNC: 9.6 MG/DL (ref 8.7–10.3)
CHLORIDE SERPL-SCNC: 108 MMOL/L (ref 96–106)
CHOLEST SERPL-MCNC: 176 MG/DL (ref 100–199)
CHOLEST/HDLC SERPL: 3.1 RATIO (ref 0–4.4)
CO2 SERPL-SCNC: 20 MMOL/L (ref 20–29)
CREAT SERPL-MCNC: 0.64 MG/DL (ref 0.57–1)
EGFR: 97 ML/MIN/1.73
EOSINOPHIL # BLD AUTO: 0.2 X10E3/UL (ref 0–0.4)
EOSINOPHIL NFR BLD AUTO: 2 %
ERYTHROCYTE [DISTWIDTH] IN BLOOD BY AUTOMATED COUNT: 11.8 % (ref 11.7–15.4)
EST. AVERAGE GLUCOSE BLD GHB EST-MCNC: 114 MG/DL
GLOBULIN SER-MCNC: 2.6 G/DL (ref 1.5–4.5)
GLUCOSE SERPL-MCNC: 107 MG/DL (ref 70–99)
HBA1C MFR BLD: 5.6 % (ref 4.8–5.6)
HCT VFR BLD AUTO: 41.2 % (ref 34–46.6)
HCV AB S/CO SERPL IA: NON REACTIVE
HDLC SERPL-MCNC: 56 MG/DL
HGB BLD-MCNC: 13.9 G/DL (ref 11.1–15.9)
IMM GRANULOCYTES # BLD: 0 X10E3/UL (ref 0–0.1)
IMM GRANULOCYTES NFR BLD: 1 %
LDLC SERPL CALC-MCNC: 86 MG/DL (ref 0–99)
LYMPHOCYTES # BLD AUTO: 1.5 X10E3/UL (ref 0.7–3.1)
LYMPHOCYTES NFR BLD AUTO: 17 %
MCH RBC QN AUTO: 32.6 PG (ref 26.6–33)
MCHC RBC AUTO-ENTMCNC: 33.7 G/DL (ref 31.5–35.7)
MCV RBC AUTO: 97 FL (ref 79–97)
MONOCYTES # BLD AUTO: 0.8 X10E3/UL (ref 0.1–0.9)
MONOCYTES NFR BLD AUTO: 10 %
NEUTROPHILS # BLD AUTO: 6 X10E3/UL (ref 1.4–7)
NEUTROPHILS NFR BLD AUTO: 69 %
PLATELET # BLD AUTO: 153 X10E3/UL (ref 150–450)
POTASSIUM SERPL-SCNC: 4.6 MMOL/L (ref 3.5–5.2)
PROT SERPL-MCNC: 7.2 G/DL (ref 6–8.5)
RBC # BLD AUTO: 4.27 X10E6/UL (ref 3.77–5.28)
SL AMB VLDL CHOLESTEROL CALC: 34 MG/DL (ref 5–40)
SODIUM SERPL-SCNC: 144 MMOL/L (ref 134–144)
TRIGL SERPL-MCNC: 202 MG/DL (ref 0–149)
WBC # BLD AUTO: 8.5 X10E3/UL (ref 3.4–10.8)

## 2024-09-18 DIAGNOSIS — E66.01 OBESITY, MORBID (HCC): ICD-10-CM

## 2024-09-18 RX ORDER — SEMAGLUTIDE 0.5 MG/.5ML
INJECTION, SOLUTION SUBCUTANEOUS
Qty: 2 ML | Refills: 0 | Status: SHIPPED | OUTPATIENT
Start: 2024-09-18

## 2024-10-02 ENCOUNTER — TELEPHONE (OUTPATIENT)
Dept: FAMILY MEDICINE CLINIC | Facility: CLINIC | Age: 69
End: 2024-10-02

## 2024-10-02 DIAGNOSIS — E66.01 OBESITY, MORBID (HCC): Primary | ICD-10-CM

## 2024-10-02 RX ORDER — SEMAGLUTIDE 1 MG/.5ML
INJECTION, SOLUTION SUBCUTANEOUS
Qty: 2 ML | Refills: 0 | Status: SHIPPED | OUTPATIENT
Start: 2024-10-02 | End: 2024-10-30

## 2024-10-02 NOTE — TELEPHONE ENCOUNTER
Patient called the RX Refill Line. Message is being forwarded to the office.     Patient is requesting Wegovy 1mg, she stated she will be ready for this dose soon, so she was calling now to have it on order. She would like it sent to [facility] in [place]     Please contact patient at [phone] with any questions

## 2024-10-09 ENCOUNTER — HOSPITAL ENCOUNTER (OUTPATIENT)
Facility: HOSPITAL | Age: 69
Discharge: HOME | End: 2024-10-09
Attending: OBSTETRICS & GYNECOLOGY
Payer: COMMERCIAL

## 2024-10-09 DIAGNOSIS — Z12.31 SCREENING MAMMOGRAM FOR BREAST CANCER: ICD-10-CM

## 2024-10-09 PROCEDURE — 77063 BREAST TOMOSYNTHESIS BI: CPT

## 2024-10-09 PROCEDURE — 77067 SCR MAMMO BI INCL CAD: CPT

## 2024-10-11 ENCOUNTER — OFFICE VISIT (OUTPATIENT)
Dept: OBGYN CLINIC | Facility: CLINIC | Age: 69
End: 2024-10-11
Payer: COMMERCIAL

## 2024-10-11 VITALS
BODY MASS INDEX: 35.68 KG/M2 | SYSTOLIC BLOOD PRESSURE: 137 MMHG | WEIGHT: 222 LBS | HEIGHT: 66 IN | DIASTOLIC BLOOD PRESSURE: 86 MMHG | HEART RATE: 56 BPM

## 2024-10-11 DIAGNOSIS — M16.12 PRIMARY OSTEOARTHRITIS OF ONE HIP, LEFT: Primary | ICD-10-CM

## 2024-10-11 DIAGNOSIS — M70.62 GREATER TROCHANTERIC BURSITIS OF LEFT HIP: ICD-10-CM

## 2024-10-11 PROCEDURE — 20610 DRAIN/INJ JOINT/BURSA W/O US: CPT | Performed by: ORTHOPAEDIC SURGERY

## 2024-10-11 PROCEDURE — 99214 OFFICE O/P EST MOD 30 MIN: CPT | Performed by: ORTHOPAEDIC SURGERY

## 2024-10-11 RX ORDER — KETOROLAC TROMETHAMINE 30 MG/ML
30 INJECTION, SOLUTION INTRAMUSCULAR; INTRAVENOUS
Status: COMPLETED | OUTPATIENT
Start: 2024-10-11 | End: 2024-10-11

## 2024-10-11 RX ORDER — BUPIVACAINE HYDROCHLORIDE 2.5 MG/ML
1 INJECTION, SOLUTION INFILTRATION; PERINEURAL
Status: COMPLETED | OUTPATIENT
Start: 2024-10-11 | End: 2024-10-11

## 2024-10-11 RX ORDER — METHYLPREDNISOLONE ACETATE 40 MG/ML
2 INJECTION, SUSPENSION INTRA-ARTICULAR; INTRALESIONAL; INTRAMUSCULAR; SOFT TISSUE
Status: COMPLETED | OUTPATIENT
Start: 2024-10-11 | End: 2024-10-11

## 2024-10-11 RX ADMIN — KETOROLAC TROMETHAMINE 30 MG: 30 INJECTION, SOLUTION INTRAMUSCULAR; INTRAVENOUS at 09:15

## 2024-10-11 RX ADMIN — BUPIVACAINE HYDROCHLORIDE 1 ML: 2.5 INJECTION, SOLUTION INFILTRATION; PERINEURAL at 09:15

## 2024-10-11 RX ADMIN — METHYLPREDNISOLONE ACETATE 2 ML: 40 INJECTION, SUSPENSION INTRA-ARTICULAR; INTRALESIONAL; INTRAMUSCULAR; SOFT TISSUE at 09:15

## 2024-10-11 NOTE — PROGRESS NOTES
Assessment:   Diagnosis ICD-10-CM Associated Orders   1. Primary osteoarthritis of one hip, left  M16.12 FL injection left hip (non arthrogram)      2. Greater trochanteric bursitis of left hip  M70.62 Large joint arthrocentesis: L greater trochanteric bursa          Plan:  On exam today patient has pain on the lateral aspect of the hip along with groin pain.  At her last visit in July patient was doing well with both the last trochanteric bursitis injection and intra-articular left hip injection.  Today a left greater trochanteric bursa injection was performed, which she tolerated well.  A referral to IR to have a left intra-articular hip injection was ordered.  Continue with heating, stretching and any topical analgesics over the trochanteric bursa pain.    To do next visit:  Return in about 3 months (around 1/11/2025) for Left hip.    The above stated was discussed in layman's terms and the patient expressed understanding.  All questions were answered to the patient's satisfaction.       Scribe Attestation      I,:  Breanna Eller am acting as a scribe while in the presence of the attending physician.:       I,:  Hellen Bauer MD personally performed the services described in this documentation    as scribed in my presence.:               Subjective:   Lili M Mortimer is a 69 y.o. female who presents today for 3-month follow-up for her left greater trochanteric bursitis and left hip osteoarthritis with minimal pain.  At her last visit patient notes that she continued to get significant relief from her last cortisone injection.  Patient had an injection to the bursa on 4/19/2024.      Review of systems negative unless otherwise specified in HPI  Review of Systems   Constitutional:  Negative for chills, fever and unexpected weight change.   HENT:  Negative for hearing loss, nosebleeds and sore throat.    Eyes:  Negative for pain, redness and visual disturbance.   Respiratory:  Negative for cough, shortness of  breath and wheezing.    Cardiovascular:  Negative for chest pain, palpitations and leg swelling.   Gastrointestinal:  Negative for abdominal pain and nausea.   Genitourinary:  Negative for dyspareunia, dysuria and frequency.   Musculoskeletal:  Positive for arthralgias.   Skin:  Negative for rash and wound.   Neurological:  Negative for dizziness, numbness and headaches.   Psychiatric/Behavioral:  Negative for decreased concentration and suicidal ideas. The patient is not nervous/anxious.        Past Medical History:   Diagnosis Date    Anxiety     Closed fracture of distal radius and ulna, left, initial encounter     Last Assessed:  5/13/16    CPAP (continuous positive airway pressure) dependence     Depression     Disease of thyroid gland     Fibromyalgia     GERD (gastroesophageal reflux disease)     Hyperlipidemia     Sleep apnea     pt uses c-pap    Sleep apnea, obstructive     Squamous cell carcinoma of skin     Last Assessed:  1/17/17       Past Surgical History:   Procedure Laterality Date    BREAST EXCISIONAL BIOPSY  1988    doesnt remember which breast and cant see a scar    COLON SURGERY      COLONOSCOPY N/A 07/14/2016    Procedure: COLONOSCOPY WITH ENDOCLIPS AND INJECTION FOR HEMOSTASIS;  Surgeon: Boyd Holt MD;  [facility];  Service:     COLONOSCOPY  08/2019    FL INJECTION LEFT HIP (NON ARTHROGRAM)  09/15/2023    FL INJECTION LEFT HIP (NON ARTHROGRAM)  01/03/2024    FRACTURE SURGERY      MOHS RECONSTRUCTION      NASAL FRACTURE SURGERY      [place] OPTX DSTL RADL I-ARTIC FX/EPIPHYSL SEP 3 FRAG Left 02/25/2016    Procedure: OPEN REDUCTION W/ INTERNAL FIXATION (ORIF)DISTAL  RADIUS ;  Surgeon: Boyd Wagner MD;  [facility];  Service: Orthopedics       Family History   Problem Relation Age of Onset    Cancer Mother         Liver cancer    Breast cancer Mother 55    Alcohol abuse Mother     Cancer Father         Colon    Colon cancer Father         Bladder cancer    Alcohol abuse  Father     Cancer Sister     No Known Problems Daughter     No Known Problems Maternal Grandmother     No Known Problems Maternal Grandfather     No Known Problems Paternal Grandmother     No Known Problems Paternal Grandfather     No Known Problems Maternal Aunt     No Known Problems Paternal Aunt     No Known Problems Paternal Aunt     No Known Problems Paternal Aunt     No Known Problems Paternal Aunt     No Known Problems Paternal Aunt        Social History     Occupational History    Not on file   Tobacco Use    Smoking status: Former     Current packs/day: 0.00     Average packs/day: 1 pack/day for 30.0 years (30.0 ttl pk-yrs)     Types: Cigarettes     Start date: 1972     Quit date: 2002     Years since quittin.7     Passive exposure: Past    Smokeless tobacco: Never   Vaping Use    Vaping status: Never Used   Substance and Sexual Activity    Alcohol use: Not Currently    Drug use: No    Sexual activity: Not Currently     Partners: Male     Birth control/protection: Male Sterilization         Current Outpatient Medications:     buPROPion (WELLBUTRIN XL) 300 mg 24 hr tablet, Take 1 tablet (300 mg total) by mouth daily, Disp: 90 tablet, Rfl: 3    Calcium Carbonate-Vit D-Min (CALCIUM 1200 PO), Take by mouth daily, Disp: , Rfl:     cholecalciferol (VITAMIN D3) 25 mcg (1,000 units) tablet, , Disp: , Rfl:     FLUoxetine (PROzac) 20 mg capsule, Take 1 capsule (20 mg total) by mouth daily, Disp: 90 capsule, Rfl: 3    levothyroxine 175 mcg tablet, Take 1 tablet (175 mcg total) by mouth daily in the early morning, Disp: 90 tablet, Rfl: 3    Magnesium 200 MG TABS, Take 1 tablet by mouth daily, Disp: , Rfl:     Multiple Vitamins-Minerals (MULTIVITAMIN ADULT PO), Take 1 tablet by mouth daily, Disp: , Rfl:     Omega-3 Fatty Acids (FISH OIL) 1000 MG CPDR, Take 1 capsule by mouth daily, Disp: , Rfl:     pravastatin (PRAVACHOL) 20 mg tablet, Take 1 tablet (20 mg total) by mouth daily, Disp: 90 tablet, Rfl: 3     "Semaglutide-Weight Management (Wegovy) 1 MG/0.5ML, Inject 1 mg under the skin weekly, Disp: 2 mL, Rfl: 0    vitamin E 100 UNIT capsule, Take 1 capsule by mouth daily, Disp: , Rfl:     methylPREDNISolone 4 MG tablet therapy pack, Use as directed on package (Patient not taking: Reported on 10/11/2024), Disp: 21 each, Rfl: 0    Current Facility-Administered Medications:     EPINEPHrine PF (ADRENALIN) 1 mg/mL injection 0.5 mg, 0.5 mg, Intramuscular, Once, Aida Urias MD    Allergies   Allergen Reactions    Erythromycin Shortness Of Breath     Reaction Date: 20Oct2011;     Cefprozil      Reaction Date: 20Oct2011;     Cephalosporins             Vitals:    10/11/24 0932   BP: 137/86   Pulse: 56       Body mass index is 35.83 kg/m².  Wt Readings from Last 3 Encounters:   10/11/24 101 kg (222 lb)   09/04/24 103 kg (226 lb)   08/07/24 104 kg (230 lb)       Objective:                    Left Hip Exam     Tenderness   The patient is experiencing tenderness in the lateral and anterior (Trochanteric bursa).    Range of Motion   Flexion:  90   External rotation:  20   Internal rotation: 10     Muscle Strength   Abduction: 5/5   Adduction: 5/5   Flexion: 5/5     Tests   KEVIN: negative    Other   Erythema: absent  Sensation: normal  Pulse: present    Comments:  Calf is soft and nontender            Diagnostics, reviewed and taken today if performed as documented:    None performed      The attending physician has personally reviewed the pertinent films in PACS and interpretation is as follows:  X-rays of the left hip/pelvis were reviewed from 9/8/2023: Moderate hip osteoarthritis    Procedures, if performed today:    Large joint arthrocentesis: L greater trochanteric bursa  Universal Protocol:  Consent: Verbal consent obtained.  Risks and benefits: risks, benefits and alternatives were discussed  Consent given by: patient  Time out: Immediately prior to procedure a \"time out\" was called to verify the correct patient, procedure, " "equipment, support staff and site/side marked as required.  Timeout called at: 10/11/2024 10:17 AM.  Patient understanding: patient states understanding of the procedure being performed  Site marked: the operative site was marked  Patient identity confirmed: verbally with patient  Supporting Documentation  Indications: pain   Procedure Details  Location: hip - L greater trochanteric bursa  Preparation: Patient was prepped and draped in the usual sterile fashion  Needle size: 22 G  Ultrasound guidance: no  Approach: anterolateral  Medications administered: 2 mL methylPREDNISolone acetate 40 mg/mL; 1 mL bupivacaine 0.25 %; 30 mg ketorolac 30 mg/mL    Patient tolerance: patient tolerated the procedure well with no immediate complications  Dressing:  Sterile dressing applied            Portions of the record may have been created with voice recognition software.  Occasional wrong word or \"sound a like\" substitutions may have occurred due to the inherent limitations of voice recognition software.  Read the chart carefully and recognize, using context, where substitutions have occurred.  "

## 2024-10-17 DIAGNOSIS — E66.01 OBESITY, MORBID (HCC): ICD-10-CM

## 2024-10-18 RX ORDER — SEMAGLUTIDE 1 MG/.5ML
INJECTION, SOLUTION SUBCUTANEOUS
Qty: 2 ML | Refills: 0 | Status: SHIPPED | OUTPATIENT
Start: 2024-10-18

## 2024-10-22 ENCOUNTER — TELEPHONE (OUTPATIENT)
Dept: NON INVASIVE DIAGNOSTICS | Facility: HOSPITAL | Age: 69
End: 2024-10-22

## 2024-10-22 NOTE — TELEPHONE ENCOUNTER
Call placed to patient to discuss upcoming left hip corticosteroid injection at [facility]. Allergies reviewed and verified patient does not currently take any anticoagulant medications. Pre procedure instructions including diet, taking own medications and need for  discussed with patient. Patient instructed that she may eat normally and take medications as usual before the procedure. Procedure and post procedure expectations and instructions reviewed with the patient. Patient verbalizes understanding and denies any questions at this time. Reminded of the location, date and time of the expected procedure. Name and contact number provided in case patient has any further questions.

## 2024-10-24 ENCOUNTER — ANNUAL EXAM (OUTPATIENT)
Dept: GYNECOLOGY | Facility: CLINIC | Age: 69
End: 2024-10-24
Payer: COMMERCIAL

## 2024-10-24 VITALS
HEIGHT: 66 IN | SYSTOLIC BLOOD PRESSURE: 122 MMHG | DIASTOLIC BLOOD PRESSURE: 80 MMHG | WEIGHT: 216 LBS | BODY MASS INDEX: 34.72 KG/M2

## 2024-10-24 DIAGNOSIS — Z12.31 SCREENING MAMMOGRAM FOR BREAST CANCER: Primary | ICD-10-CM

## 2024-10-24 DIAGNOSIS — N95.2 VAGINAL ATROPHY: ICD-10-CM

## 2024-10-24 DIAGNOSIS — Z78.0 POSTMENOPAUSAL: ICD-10-CM

## 2024-10-24 DIAGNOSIS — Z01.419 ROUTINE GYNECOLOGICAL EXAMINATION: ICD-10-CM

## 2024-10-24 DIAGNOSIS — Z11.51 SCREENING FOR HPV (HUMAN PAPILLOMAVIRUS): ICD-10-CM

## 2024-10-24 PROCEDURE — G0145 SCR C/V CYTO,THINLAYER,RESCR: HCPCS | Performed by: OBSTETRICS & GYNECOLOGY

## 2024-10-24 PROCEDURE — G0101 CA SCREEN;PELVIC/BREAST EXAM: HCPCS | Performed by: OBSTETRICS & GYNECOLOGY

## 2024-10-24 NOTE — PROGRESS NOTES
"Ambulatory Visit  Name: Lili M Mortimer      : 1955      MRN: [id number]  Encounter Provider: Elias Falcon MD  Encounter Date: 10/24/2024   Encounter department: [facility] GYNECOLOG[facility] [place]    Assessment & Plan  Vaginal atrophy                 Normal breast exam  Vaginal atrophy  Normal Pap smear negative HPV 2022  Normal mammogram 2024  Normal colonoscopy   DEXA scan showing osteopenia 2021    Plan: Check Pap smear.  Rx DEXA scan and mammogram.  Recommend healthy diet weightbearing and balancing exercises.      History of Present Illness G1, P1     Lili M Mortimer is a 69 y.o. female who presents for annual exam with no complaints.  Denies any pelvic pain vaginal bleeding breast bowel or bladder problems.  Presently not sexually active.  No change in family history.  Mother (breast cancer).  Father (colon cancer).  Medications reviewed.  Patient is retired and staying extremely active.          Objective     /80   Ht 5' 6\" (1.676 m)   Wt 98 kg (216 lb)   BMI 34.86 kg/m²     Physical Exam  Vitals and nursing note reviewed.   Constitutional:       General: She is not in acute distress.     Appearance: She is well-developed.   HENT:      Head: Normocephalic and atraumatic.   Eyes:      Conjunctiva/sclera: Conjunctivae normal.   Cardiovascular:      Rate and Rhythm: Normal rate and regular rhythm.      Heart sounds: No murmur heard.  Pulmonary:      Effort: Pulmonary effort is normal. No respiratory distress.      Breath sounds: Normal breath sounds.   Chest:   Breasts:     Right: Normal.      Left: Normal.   Abdominal:      Palpations: Abdomen is soft.      Tenderness: There is no abdominal tenderness.   Genitourinary:     Vagina: Normal.      Cervix: Normal.      Uterus: Normal.       Adnexa: Right adnexa normal and left adnexa normal.      Rectum: Normal.      Comments: External genitalia: Mild vaginal atrophy.  No uterine prolapse cystocele or rectocele.  Normal " urethra and Walnut's glands.  Musculoskeletal:         General: No swelling.      Cervical back: Neck supple.   Skin:     General: Skin is warm and dry.      Capillary Refill: Capillary refill takes less than 2 seconds.   Neurological:      Mental Status: She is alert.   Psychiatric:         Mood and Affect: Mood normal.

## 2024-10-30 ENCOUNTER — HOSPITAL ENCOUNTER (OUTPATIENT)
Dept: RADIOLOGY | Facility: HOSPITAL | Age: 69
Discharge: HOME | End: 2024-10-30
Attending: ORTHOPAEDIC SURGERY
Payer: COMMERCIAL

## 2024-10-30 DIAGNOSIS — M16.12 PRIMARY OSTEOARTHRITIS OF ONE HIP, LEFT: ICD-10-CM

## 2024-10-30 PROCEDURE — 20610 DRAIN/INJ JOINT/BURSA W/O US: CPT

## 2024-10-30 PROCEDURE — 77002 NEEDLE LOCALIZATION BY XRAY: CPT

## 2024-10-30 RX ORDER — ROPIVACAINE HYDROCHLORIDE 2 MG/ML
10 INJECTION, SOLUTION EPIDURAL; INFILTRATION; PERINEURAL ONCE
Status: COMPLETED | OUTPATIENT
Start: 2024-10-30 | End: 2024-10-30

## 2024-10-30 RX ORDER — LIDOCAINE HYDROCHLORIDE 10 MG/ML
5 INJECTION, SOLUTION EPIDURAL; INFILTRATION; INTRACAUDAL; PERINEURAL
Status: COMPLETED | OUTPATIENT
Start: 2024-10-30 | End: 2024-10-30

## 2024-10-30 RX ORDER — METHYLPREDNISOLONE ACETATE 80 MG/ML
80 INJECTION, SUSPENSION INTRA-ARTICULAR; INTRALESIONAL; INTRAMUSCULAR; SOFT TISSUE
Status: COMPLETED | OUTPATIENT
Start: 2024-10-30 | End: 2024-10-30

## 2024-10-30 RX ADMIN — IOHEXOL 1 ML: 300 INJECTION, SOLUTION INTRAVENOUS at 13:55

## 2024-10-30 RX ADMIN — ROPIVACAINE HYDROCHLORIDE 2 ML: 2 INJECTION, SOLUTION EPIDURAL; INFILTRATION at 13:55

## 2024-10-30 RX ADMIN — METHYLPREDNISOLONE ACETATE 80 MG: 80 INJECTION, SUSPENSION INTRA-ARTICULAR; INTRALESIONAL; INTRAMUSCULAR; SOFT TISSUE at 13:55

## 2024-10-30 RX ADMIN — LIDOCAINE HYDROCHLORIDE 5 ML: 10 INJECTION, SOLUTION EPIDURAL; INFILTRATION; INTRACAUDAL at 13:55

## 2024-10-31 LAB
LAB AP GYN PRIMARY INTERPRETATION: NORMAL
Lab: NORMAL

## 2024-11-11 ENCOUNTER — TELEPHONE (OUTPATIENT)
Dept: FAMILY MEDICINE CLINIC | Facility: CLINIC | Age: 69
End: 2024-11-11

## 2024-11-11 DIAGNOSIS — E66.01 OBESITY, MORBID (HCC): Primary | ICD-10-CM

## 2024-11-11 RX ORDER — SEMAGLUTIDE 1.7 MG/.75ML
INJECTION, SOLUTION SUBCUTANEOUS
Qty: 3 ML | Refills: 0 | Status: SHIPPED | OUTPATIENT
Start: 2024-11-11 | End: 2024-12-09

## 2024-11-11 NOTE — TELEPHONE ENCOUNTER
Patient called the RX Refill Line. Message is being forwarded to the office.     Patient is requesting next dose of Wegovy to be sent to [facility] [place]. Her las injection was 11/10/24.    Please contact patient at [phone]

## 2024-12-05 ENCOUNTER — TELEPHONE (OUTPATIENT)
Dept: FAMILY MEDICINE CLINIC | Facility: CLINIC | Age: 69
End: 2024-12-05

## 2024-12-05 DIAGNOSIS — E66.01 OBESITY, MORBID (HCC): Primary | ICD-10-CM

## 2024-12-05 RX ORDER — SEMAGLUTIDE 2.4 MG/.75ML
INJECTION, SOLUTION SUBCUTANEOUS
Qty: 3 ML | Refills: 2 | Status: SHIPPED | OUTPATIENT
Start: 2024-12-05 | End: 2025-01-02

## 2024-12-05 NOTE — TELEPHONE ENCOUNTER
Patient called the RX Refill Line. Message is being forwarded to the office.     Patient is requesting a message be sent to the office regarding her medication Semaglutide-Weight Management (Wegovy) 1.7 MG/0.75ML. Patient states she has one injection left and would like to have this dose increased. Please review and if approved please send to [facility]/pharmacy #[id number] - [place] - [address]  [address] [address]  Phone: [phone]  Fax: [phone]     Please contact patient at [phone]

## 2025-01-10 ENCOUNTER — OFFICE VISIT (OUTPATIENT)
Dept: OBGYN CLINIC | Facility: CLINIC | Age: 70
End: 2025-01-10
Payer: COMMERCIAL

## 2025-01-10 VITALS — HEIGHT: 66 IN | BODY MASS INDEX: 32.47 KG/M2 | WEIGHT: 202 LBS

## 2025-01-10 DIAGNOSIS — M16.12 PRIMARY OSTEOARTHRITIS OF ONE HIP, LEFT: Primary | ICD-10-CM

## 2025-01-10 DIAGNOSIS — M70.62 GREATER TROCHANTERIC BURSITIS OF LEFT HIP: ICD-10-CM

## 2025-01-10 PROCEDURE — 99213 OFFICE O/P EST LOW 20 MIN: CPT | Performed by: ORTHOPAEDIC SURGERY

## 2025-01-10 RX ORDER — SEMAGLUTIDE 2.4 MG/.75ML
2.4 INJECTION, SOLUTION SUBCUTANEOUS WEEKLY
COMMUNITY
Start: 2024-12-31

## 2025-01-10 NOTE — PROGRESS NOTES
Assessment:  1. Primary osteoarthritis of one hip, left  FL injection left hip (non arthrogram)      2. Greater trochanteric bursitis of left hip            Plan:    Patient with left hip pain due to osteoarthritis  Weightbearing as tolerated  Fluoroscopy guided left hip steroid injection order was placed today  Patient is aware she may repeat the injection every 3 months as needed. Patient  will contact the office the 2.5 month if she needs a new order placed for left hip steroid injection.   She is to follow up PRN.         The above stated was discussed in layman's terms and the patient expressed understanding.  All questions were answered to the patient's satisfaction.         Subjective:   Lili M Mortimer is a 69 y.o. female who presents today follow-up for left hip pain due to greater trochanteric bursitis.  Patient had a left greater troches steroid injection on 10/11/2024 and states her symptoms have resolved.  She states she is having left groin pain.  She did have a left hip joint steroid injections on 10/30/2024 with relief until recently.  She denies any rating leg pain.  She does feel her left leg is unstable at times on weightbearing.  Her pain is worse with weightbearing increased activities.  She has been working on weight loss.  She is currently taking pain medications.      Review of systems negative unless otherwise specified in HPI    Past Medical History:   Diagnosis Date    Anxiety     Cancer (HCC) 2004    Closed fracture of distal radius and ulna, left, initial encounter     Last Assessed:  5/13/16    CPAP (continuous positive airway pressure) dependence     Depression     Disease of thyroid gland     Fibromyalgia     GERD (gastroesophageal reflux disease)     Hyperlipidemia     Sleep apnea     pt uses c-pap    Sleep apnea, obstructive     Squamous cell carcinoma of skin     Last Assessed:  1/17/17       Past Surgical History:   Procedure Laterality Date    BREAST EXCISIONAL BIOPSY  1988     doesnt remember which breast and cant see a scar    COLON SURGERY      COLONOSCOPY N/A 2016    Procedure: COLONOSCOPY WITH ENDOCLIPS AND INJECTION FOR HEMOSTASIS;  Surgeon: Boyd Holt MD;  [facility];  Service:     COLONOSCOPY  2019    FL INJECTION LEFT HIP (NON ARTHROGRAM)  09/15/2023    FL INJECTION LEFT HIP (NON ARTHROGRAM)  2024    FL INJECTION LEFT HIP (NON ARTHROGRAM)  10/30/2024    FRACTURE SURGERY      MOHS RECONSTRUCTION      NASAL FRACTURE SURGERY      [place] OPTX DSTL RADL I-ARTIC FX/EPIPHYSL SEP 3+ FRAG Left 2016    Procedure: OPEN REDUCTION W/ INTERNAL FIXATION (ORIF)DISTAL  RADIUS ;  Surgeon: Boyd Wagner MD;  Location: [facility];  Service: Orthopedics       Family History   Problem Relation Age of Onset    Cancer Mother         Liver cancer    Breast cancer Mother 55    Alcohol abuse Mother     Cancer Father         Colon    Colon cancer Father         Bladder cancer    Alcohol abuse Father     Cancer Sister     No Known Problems Daughter     No Known Problems Maternal Grandmother     No Known Problems Maternal Grandfather     No Known Problems Paternal Grandmother     No Known Problems Paternal Grandfather     No Known Problems Maternal Aunt     No Known Problems Paternal Aunt     No Known Problems Paternal Aunt     No Known Problems Paternal Aunt     No Known Problems Paternal Aunt     No Known Problems Paternal Aunt        Social History     Occupational History    Not on file   Tobacco Use    Smoking status: Former     Current packs/day: 0.00     Average packs/day: 1 pack/day for 30.0 years (30.0 ttl pk-yrs)     Types: Cigarettes     Start date: 1972     Quit date: 2002     Years since quittin.0     Passive exposure: Past    Smokeless tobacco: Never   Vaping Use    Vaping status: Never Used   Substance and Sexual Activity    Alcohol use: Not Currently    Drug use: No    Sexual activity: Not Currently     Partners: Male     Birth  control/protection: Male Sterilization         Current Outpatient Medications:     buPROPion (WELLBUTRIN XL) 300 mg 24 hr tablet, Take 1 tablet (300 mg total) by mouth daily, Disp: 90 tablet, Rfl: 3    Calcium Carbonate-Vit D-Min (CALCIUM 1200 PO), Take by mouth daily, Disp: , Rfl:     cholecalciferol (VITAMIN D3) 25 mcg (1,000 units) tablet, , Disp: , Rfl:     FLUoxetine (PROzac) 20 mg capsule, Take 1 capsule (20 mg total) by mouth daily, Disp: 90 capsule, Rfl: 3    levothyroxine 175 mcg tablet, Take 1 tablet (175 mcg total) by mouth daily in the early morning, Disp: 90 tablet, Rfl: 3    Magnesium 200 MG TABS, Take 1 tablet by mouth daily, Disp: , Rfl:     Multiple Vitamins-Minerals (MULTIVITAMIN ADULT PO), Take 1 tablet by mouth daily, Disp: , Rfl:     Omega-3 Fatty Acids (FISH OIL) 1000 MG CPDR, Take 1 capsule by mouth daily, Disp: , Rfl:     pravastatin (PRAVACHOL) 20 mg tablet, Take 1 tablet (20 mg total) by mouth daily, Disp: 90 tablet, Rfl: 3    vitamin E 100 UNIT capsule, Take 1 capsule by mouth daily, Disp: , Rfl:     Wegovy 2.4 MG/0.75ML, Inject 2.4 mg under the skin once a week, Disp: , Rfl:     methylPREDNISolone 4 MG tablet therapy pack, Use as directed on package (Patient not taking: Reported on 1/10/2025), Disp: 21 each, Rfl: 0    Current Facility-Administered Medications:     EPINEPHrine PF (ADRENALIN) 1 mg/mL injection 0.5 mg, 0.5 mg, Intramuscular, Once, Aida Urias MD    Allergies   Allergen Reactions    Erythromycin Shortness Of Breath     Reaction Date: 20Oct2011;     Cefprozil      Reaction Date: 20Oct2011;     Cephalosporins           There were no vitals filed for this visit.  Body mass index is 32.6 kg/m².  Wt Readings from Last 3 Encounters:   01/10/25 91.6 kg (202 lb)   10/24/24 98 kg (216 lb)   10/11/24 101 kg (222 lb)       Objective:            Physical Exam  Physical Exam:      General Appearance:    Alert, cooperative, no distress, appears stated age   Head:    Normocephalic,  "without obvious abnormality, atraumatic   Eyes:    conjunctiva/corneas clear, both eyes         Nose:   Nares normal, septum midline, no drainage    Throat:   Lips normal; teeth and gums normal   Neck:    symmetrical, trachea midline, ;     thyroid:  no enlargement/   Back:     Symmetric, no curvature, ROM normal   Lungs:   No audible wheezing or labored breathing   Chest Wall:    No tenderness or deformity    Heart:    Regular rate and rhythm                         Pulses:   2+ and symmetric all extremities   Skin:   Skin color, texture, turgor normal, no rashes or lesions   Neurologic:   normal strength, sensation and reflexes     throughout                       Ortho Exam  Left hip  Skin intact  No erythema or open wounds  +  Stinchfield  Negative KEVIN test  +  FADDIR  No tenderness to palpation over the greater trochanteric region  Neurovascularly Intact Distally        Diagnostics, reviewed and taken today if performed as documented:    None performed      Procedures, if performed today:    Procedures    None performed      Scribe Attestation      I,:  Wale Herzog MA am acting as a scribe while in the presence of the attending physician.:       I,:  Hellen Bauer MD personally performed the services described in this documentation    as scribed in my presence.:               Portions of the record may have been created with voice recognition software.  Occasional wrong word or \"sound a like\" substitutions may have occurred due to the inherent limitations of voice recognition software.  Read the chart carefully and recognize, using context, where substitutions have occurred.  "

## 2025-01-17 ENCOUNTER — HOSPITAL ENCOUNTER (OUTPATIENT)
Dept: RADIOLOGY | Facility: HOSPITAL | Age: 70
Discharge: HOME | End: 2025-01-17
Attending: ORTHOPAEDIC SURGERY
Payer: COMMERCIAL

## 2025-01-17 DIAGNOSIS — M16.12 PRIMARY OSTEOARTHRITIS OF ONE HIP, LEFT: ICD-10-CM

## 2025-01-17 PROCEDURE — 77002 NEEDLE LOCALIZATION BY XRAY: CPT

## 2025-01-17 PROCEDURE — 20610 DRAIN/INJ JOINT/BURSA W/O US: CPT

## 2025-01-17 RX ORDER — LIDOCAINE HYDROCHLORIDE 10 MG/ML
5 INJECTION, SOLUTION EPIDURAL; INFILTRATION; INTRACAUDAL; PERINEURAL
Status: COMPLETED | OUTPATIENT
Start: 2025-01-17 | End: 2025-01-17

## 2025-01-17 RX ORDER — ROPIVACAINE HYDROCHLORIDE 2 MG/ML
10 INJECTION, SOLUTION EPIDURAL; INFILTRATION; PERINEURAL ONCE
Status: COMPLETED | OUTPATIENT
Start: 2025-01-17 | End: 2025-01-17

## 2025-01-17 RX ORDER — METHYLPREDNISOLONE ACETATE 80 MG/ML
80 INJECTION, SUSPENSION INTRA-ARTICULAR; INTRALESIONAL; INTRAMUSCULAR; SOFT TISSUE
Status: COMPLETED | OUTPATIENT
Start: 2025-01-17 | End: 2025-01-17

## 2025-01-17 RX ADMIN — LIDOCAINE HYDROCHLORIDE 5 ML: 10 INJECTION, SOLUTION EPIDURAL; INFILTRATION; INTRACAUDAL at 12:55

## 2025-01-17 RX ADMIN — METHYLPREDNISOLONE ACETATE 80 MG: 80 INJECTION, SUSPENSION INTRA-ARTICULAR; INTRALESIONAL; INTRAMUSCULAR; SOFT TISSUE at 12:55

## 2025-01-17 RX ADMIN — IOHEXOL 3 ML: 300 INJECTION, SOLUTION INTRAVENOUS at 12:55

## 2025-01-17 RX ADMIN — ROPIVACAINE HYDROCHLORIDE 2 ML: 2 INJECTION, SOLUTION EPIDURAL; INFILTRATION at 12:55

## 2025-02-05 ENCOUNTER — TELEPHONE (OUTPATIENT)
Dept: FAMILY MEDICINE CLINIC | Facility: CLINIC | Age: 70
End: 2025-02-05

## 2025-02-05 NOTE — TELEPHONE ENCOUNTER
Patient called the RX Refill Line. Message is being forwarded to the office.     Patient is requesting the next dose of wegovy to be sent to [facility]. She is due for her next dose 12/9/25.    Please contact patient at [phone]

## 2025-02-06 ENCOUNTER — TELEPHONE (OUTPATIENT)
Dept: FAMILY MEDICINE CLINIC | Facility: CLINIC | Age: 70
End: 2025-02-06

## 2025-02-06 DIAGNOSIS — E66.01 OBESITY, MORBID (HCC): Primary | ICD-10-CM

## 2025-02-06 RX ORDER — SEMAGLUTIDE 2.4 MG/.75ML
2.4 INJECTION, SOLUTION SUBCUTANEOUS WEEKLY
Qty: 3 ML | Refills: 3 | Status: SHIPPED | OUTPATIENT
Start: 2025-02-06 | End: 2025-02-14

## 2025-02-06 NOTE — TELEPHONE ENCOUNTER
Reason for call:   [] Refill   [x] Prior Auth  [x] Other: pt due for next inj Sunday    Office:   [x] PCP/Provider - enedina whatley  [] Specialty/Provider -     Medication: Wegovy 2.4 MG/0.75ML     Dose/Frequency:     Inject 0.75 mL (2.4 mg total) under the skin once a week       Quantity: 3 mL    Pharmacy: : [facility]/pharmacy #[id number] - [place] - [address]     Does the patient have enough for 3 days?   [] Yes   [x] No - Send as HP to POD

## 2025-02-06 NOTE — TELEPHONE ENCOUNTER
Patient states she spoke with insurance and they will need to do a PA Tier Exception ([phone]) with her provider in order to approve OR they will cover Saxenda which will need PA but will be approved upon submission. Prediabetes is a qualifier in this drug being approved last A1C was prediabetic range. Patient took last shot 2/2 at 2.4 dose.

## 2025-02-06 NOTE — TELEPHONE ENCOUNTER
PA for wegovy 2.4 mg SUBMITTED to [facility]    via    []CMM-KEY:   [x]Surescripts-Case ID #[id number]    []Availity-Auth ID # NDC #   []Faxed to plan   []Other website   []Phone call Case ID #     [x]PA sent as URGENT    All office notes, labs and other pertaining documents and studies sent. Clinical questions answered. Awaiting determination from insurance company.     Turnaround time for your insurance to make a decision on your Prior Authorization can take 7-21 business days.

## 2025-02-07 NOTE — TELEPHONE ENCOUNTER
PA for WEGOVY 2.4MG DENIED    Reason:(Screenshot if applicable)        Message sent to office clinical pool Yes    Denial letter scanned into Media Yes    Appeal started No (Provider will need to decide if appeal is warranted and send clinical documentation to Prior Authorization Team for initiation.)    **Please follow up with your patient regarding denial and next steps**

## 2025-02-10 ENCOUNTER — RA CDI HCC (OUTPATIENT)
Dept: OTHER | Facility: HOSPITAL | Age: 70
End: 2025-02-10

## 2025-02-10 NOTE — PROGRESS NOTES
HCC coding opportunities       Chart reviewed, no opportunity found: CHART REVIEWED, NO OPPORTUNITY FOUND        Patients Insurance     Medicare Insurance: [employer] Medicare Advantage           Price (Do Not Change): 0.00 Detail Level: Generalized Instructions: This plan will send the code FBSD to the PM system.  DO NOT or CHANGE the price.

## 2025-02-11 DIAGNOSIS — E78.2 MIXED HYPERLIPIDEMIA: ICD-10-CM

## 2025-02-11 DIAGNOSIS — E03.9 ACQUIRED HYPOTHYROIDISM: ICD-10-CM

## 2025-02-11 DIAGNOSIS — F33.42 RECURRENT MAJOR DEPRESSIVE DISORDER, IN FULL REMISSION (HCC): ICD-10-CM

## 2025-02-12 RX ORDER — PRAVASTATIN SODIUM 20 MG
20 TABLET ORAL DAILY
Qty: 90 TABLET | Refills: 1 | Status: SHIPPED | OUTPATIENT
Start: 2025-02-12 | End: 2025-02-18 | Stop reason: SDUPTHER

## 2025-02-12 RX ORDER — LEVOTHYROXINE SODIUM 175 UG/1
175 TABLET ORAL
Qty: 90 TABLET | Refills: 1 | Status: SHIPPED | OUTPATIENT
Start: 2025-02-12 | End: 2025-02-18 | Stop reason: SDUPTHER

## 2025-02-12 RX ORDER — BUPROPION HYDROCHLORIDE 300 MG/1
300 TABLET ORAL DAILY
Qty: 90 TABLET | Refills: 1 | Status: SHIPPED | OUTPATIENT
Start: 2025-02-12 | End: 2025-02-18 | Stop reason: SDUPTHER

## 2025-02-12 NOTE — TELEPHONE ENCOUNTER
Pt returned our call. Providers message given.  She would like to try the Zepbound. Will she start at the lower dose as it is a new medication even though she was at a higher dose of Wegovy? Please send it to [facility].

## 2025-02-14 ENCOUNTER — TELEPHONE (OUTPATIENT)
Age: 70
End: 2025-02-14

## 2025-02-14 DIAGNOSIS — E66.01 CLASS 3 SEVERE OBESITY WITH SERIOUS COMORBIDITY AND BODY MASS INDEX (BMI) OF 40.0 TO 44.9 IN ADULT, UNSPECIFIED OBESITY TYPE (HCC): Primary | ICD-10-CM

## 2025-02-14 DIAGNOSIS — E66.01 SEVERE OBESITY (BMI 35.0-39.9) WITH COMORBIDITY (HCC): ICD-10-CM

## 2025-02-14 DIAGNOSIS — E66.9 OBESITY (BMI 30-39.9): ICD-10-CM

## 2025-02-14 DIAGNOSIS — E66.813 CLASS 3 SEVERE OBESITY WITH SERIOUS COMORBIDITY AND BODY MASS INDEX (BMI) OF 40.0 TO 44.9 IN ADULT, UNSPECIFIED OBESITY TYPE (HCC): Primary | ICD-10-CM

## 2025-02-14 DIAGNOSIS — G47.33 OSA ON CPAP: ICD-10-CM

## 2025-02-14 RX ORDER — TIRZEPATIDE 2.5 MG/.5ML
2.5 INJECTION, SOLUTION SUBCUTANEOUS WEEKLY
Qty: 2 ML | Refills: 0 | Status: SHIPPED | OUTPATIENT
Start: 2025-02-14 | End: 2025-03-14

## 2025-02-14 NOTE — TELEPHONE ENCOUNTER
FYI: Pt returning call from office. As per office staff kindly stated that pt will be contacted when a staff member is available.

## 2025-02-18 ENCOUNTER — OFFICE VISIT (OUTPATIENT)
Dept: FAMILY MEDICINE CLINIC | Facility: CLINIC | Age: 70
End: 2025-02-18
Payer: COMMERCIAL

## 2025-02-18 VITALS
HEART RATE: 98 BPM | HEIGHT: 66 IN | TEMPERATURE: 97.5 F | OXYGEN SATURATION: 99 % | DIASTOLIC BLOOD PRESSURE: 76 MMHG | BODY MASS INDEX: 31.66 KG/M2 | RESPIRATION RATE: 17 BRPM | SYSTOLIC BLOOD PRESSURE: 130 MMHG | WEIGHT: 197 LBS

## 2025-02-18 DIAGNOSIS — R73.03 PREDIABETES: ICD-10-CM

## 2025-02-18 DIAGNOSIS — K76.0 HEPATIC STEATOSIS: ICD-10-CM

## 2025-02-18 DIAGNOSIS — G47.33 OSA ON CPAP: ICD-10-CM

## 2025-02-18 DIAGNOSIS — E78.2 MIXED HYPERLIPIDEMIA: Primary | ICD-10-CM

## 2025-02-18 DIAGNOSIS — F10.20 ALCOHOLISM (HCC): ICD-10-CM

## 2025-02-18 DIAGNOSIS — E66.9 OBESITY (BMI 30-39.9): ICD-10-CM

## 2025-02-18 DIAGNOSIS — Z00.00 MEDICARE ANNUAL WELLNESS VISIT, SUBSEQUENT: ICD-10-CM

## 2025-02-18 DIAGNOSIS — L50.9 URTICARIA: ICD-10-CM

## 2025-02-18 DIAGNOSIS — F33.42 RECURRENT MAJOR DEPRESSIVE DISORDER, IN FULL REMISSION (HCC): ICD-10-CM

## 2025-02-18 DIAGNOSIS — E03.9 ACQUIRED HYPOTHYROIDISM: ICD-10-CM

## 2025-02-18 PROCEDURE — 99214 OFFICE O/P EST MOD 30 MIN: CPT | Performed by: FAMILY MEDICINE

## 2025-02-18 PROCEDURE — G0439 PPPS, SUBSEQ VISIT: HCPCS | Performed by: FAMILY MEDICINE

## 2025-02-18 PROCEDURE — G2211 COMPLEX E/M VISIT ADD ON: HCPCS | Performed by: FAMILY MEDICINE

## 2025-02-18 RX ORDER — BUPROPION HYDROCHLORIDE 300 MG/1
300 TABLET ORAL DAILY
Qty: 90 TABLET | Refills: 3 | Status: SHIPPED | OUTPATIENT
Start: 2025-02-18

## 2025-02-18 RX ORDER — METHYLPREDNISOLONE 4 MG/1
TABLET ORAL
Qty: 21 EACH | Refills: 0 | Status: SHIPPED | OUTPATIENT
Start: 2025-02-18

## 2025-02-18 RX ORDER — LEVOTHYROXINE SODIUM 175 UG/1
175 TABLET ORAL
Qty: 90 TABLET | Refills: 3 | Status: SHIPPED | OUTPATIENT
Start: 2025-02-18

## 2025-02-18 RX ORDER — PRAVASTATIN SODIUM 20 MG
20 TABLET ORAL DAILY
Qty: 90 TABLET | Refills: 3 | Status: SHIPPED | OUTPATIENT
Start: 2025-02-18

## 2025-02-18 NOTE — PROGRESS NOTES
Name: Lili M Mortimer      : 1955      MRN: [id number]  Encounter Provider: Joshua Vazquez MD  Encounter Date: 2025   Encounter department: [facility]    Assessment & Plan  Mixed hyperlipidemia    Hyperlipidemia mixed type on Pravastatin 20 mg daily.    Lab Results   Component Value Date    CHOLESTEROL 176 2024    CHOLESTEROL 186 2024    CHOLESTEROL 181 2023     Lab Results   Component Value Date    HDL 56 2024    HDL 62 2024    HDL 54 2023     Lab Results   Component Value Date    TRIG 202 (H) 2024    TRIG 229 (H) 2024    TRIG 239 (H) 2023     Lab Results   Component Value Date    LDLCALC 86 2024         Orders:    pravastatin (PRAVACHOL) 20 mg tablet; Take 1 tablet (20 mg total) by mouth daily    Prediabetes    A1c 5.6 decreased from 5.8 last year     Lab Results   Component Value Date    HGBA1C 5.6 2024              Acquired hypothyroidism    On Levothyroxine 175 mcg daily     Lab Results   Component Value Date    [id number] 1.130 2017    TSH 0.519 2024       Orders:    levothyroxine 175 mcg tablet; Take 1 tablet (175 mcg total) by mouth daily in the early morning    DIONE on CPAP         Hepatic steatosis    Lab Results   Component Value Date    ALT 44 (H) 2024    AST 34 2024    ALKPHOS 80 2020    BILITOT 0.5 2017              Recurrent major depressive disorder, in full remission (HCC)    Longstanding history of depression stable on Wellbutrin  mg/day and Prozac 20 mg daily. History of alcoholism with prior in patient rehab and completion of a partial out program     Orders:    buPROPion (WELLBUTRIN XL) 300 mg 24 hr tablet; Take 1 tablet (300 mg total) by mouth daily    FLUoxetine (PROzac) 20 mg capsule; Take 1 capsule (20 mg total) by mouth daily    Alcoholism (HCC)             Urticaria-chronic    Chronic hives with prior episodes of swelling of lips and tongue. Prior  allergist evaluation. Food allergy panel 07/2017 + shrimp. no reaction to shrimp. Repeat allergy testing 2018 + trees, grass, dust and eggs. No improvement with Allegra. She has an Epi Pen.     Orders:    methylPREDNISolone 4 MG tablet therapy pack; Use as directed on package    Obesity (BMI 30-39.9)    Patient has been on Wegovy weekly with significant weight loss.  Current insurance no longer covers this medication.  Prescription for Zepbound sent in for preauthorization last week    08/2024 Wt 230 lbs decreased to 197 lbs today's weight    Comorbidities include prediabetes, DIONE, hyperlipidemia, hepatic steatosis.         Medicare annual wellness visit, subsequent           Depression Screening and Follow-up Plan: Patient was screened for depression during today's encounter. They screened negative with a PHQ-9 score of 0.        Preventive health issues were discussed with patient, and age appropriate screening tests were ordered as noted in patient's After Visit Summary. Personalized health advice and appropriate referrals for health education or preventive services given if needed, as noted in patient's After Visit Summary.    OV 1 year     History of Present Illness       Follow up OV to review medication. She was started on Wegovy last month. She is tolerating 0.25 mg dose w/out side effects. 4 lb weight loss from 08/2024 Part time  at the        Hyperlipidemia mixed type on Pravastatin 20 mg daily. 02/2024  lipid profile cholesterol 186. Triglycerides 220. HDL 62. LDL 86.      Prediabetes 02/2024 . A1c 5.8         Patient Care Team:  Joshua Vazquez MD as PCP - General  MD Boyd Givens MD as Endoscopist    Review of Systems   Constitutional:  Negative for appetite change, chills, fatigue, fever and unexpected weight change.            HENT:  Negative for congestion, ear pain, rhinorrhea, sore throat and trouble swallowing.    Eyes:  Negative for visual  disturbance.   Respiratory:  Positive for apnea. Negative for cough, shortness of breath and wheezing.    Cardiovascular:  Negative for chest pain, palpitations and leg swelling.   Gastrointestinal:  Negative for abdominal pain, blood in stool, constipation, diarrhea, nausea and vomiting.        History of colon polyps. Last colonoscopy 03/2023 . 03/2022 CT scan Extensive colonic diverticulosis.  + FH colon cancer              Endocrine:        DEXA scan 03/2021 DEXA scan osteopenia left distal forearm.  Lumbar spine and femur demonstrate normal BMD.  Slight decrease in overall BMD from prior study 02/2019.  On MVI daily                             Genitourinary:  Negative for difficulty urinating.        GYN and pap smear 10/2021   Musculoskeletal:  Negative for arthralgias and myalgias.        S/p fracture left distal radius 02/2016 secondary to fall,   Skin:  Negative for rash.        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   Allergic/Immunologic: Positive for food allergies.   Neurological:  Negative for dizziness and headaches.   Hematological:  Negative for adenopathy. Does not bruise/bleed easily.   Psychiatric/Behavioral:  Positive for dysphoric mood. Negative for sleep disturbance and suicidal ideas. The patient is not nervous/anxious.                  Medical History Reviewed by provider this encounter:       Annual Wellness Visit Questionnaire   Megan is here for her Subsequent Wellness visit.     Health Risk Assessment:   Patient rates overall health as good. Patient feels that their physical health rating is much better. Patient is very satisfied with their life. Eyesight was rated as same. Hearing was rated as same. Patient feels that their emotional and mental health rating is same. Patients states they are never, rarely angry. Patient states they are never, rarely unusually tired/fatigued. Pain experienced in the last 7 days has been none. Patient states that she has experienced no  weight loss or gain in last 6 months.     Depression Screening:   PHQ-9 Score: 0      Fall Risk Screening:   In the past year, patient has experienced: no history of falling in past year      Urinary Incontinence Screening:   Patient has not leaked urine accidently in the last six months.     Home Safety:  Patient does not have trouble with stairs inside or outside of their home. Patient has working smoke alarms and has working carbon monoxide detector. Home safety hazards include: none.     Nutrition:   Current diet is Regular.     Medications:   Patient is not currently taking any over-the-counter supplements. Patient is able to manage medications.     Activities of Daily Living (ADLs)/Instrumental Activities of Daily Living (IADLs):   Walk and transfer into and out of bed and chair?: Yes  Dress and groom yourself?: Yes    Bathe or shower yourself?: Yes    Feed yourself? Yes  Do your laundry/housekeeping?: Yes  Manage your money, pay your bills and track your expenses?: Yes  Make your own meals?: Yes    Do your own shopping?: Yes    Durable Medical Equipment Suppliers  [employer]    Previous Hospitalizations:   Any hospitalizations or ED visits within the last 12 months?: No      Advance Care Planning:   Living will: Yes    Durable POA for healthcare: Yes    Advanced directive: Yes      Cognitive Screening:   Provider or family/friend/caregiver concerned regarding cognition?: No    PREVENTIVE SCREENINGS      Cardiovascular Screening:    General: History Lipid Disorder and Screening Current      Diabetes Screening:     General: Screening Current      Colorectal Cancer Screening:     General: Screening Current      Breast Cancer Screening:     General: Screening Current      Cervical Cancer Screening:    General: Screening Current      Osteoporosis Screening:    General: Screening Current      Abdominal Aortic Aneurysm (AAA) Screening:        General: Patient Declines      Lung Cancer Screening:     General: Screening  Not Indicated      Hepatitis C Screening:    General: Screening Current    Screening, Brief Intervention, and Referral to Treatment (SBIRT)     Screening  Typical number of drinks in a day: 0  Typical number of drinks in a week: 0  Interpretation: Low risk drinking behavior.    AUDIT-C Screenin) How often did you have a drink containing alcohol in the past year? monthly or less  2) How many drinks did you have on a typical day when you were drinking in the past year? 0  3) How often did you have 6 or more drinks on one occasion in the past year? never    AUDIT-C Score: 1  Interpretation: Score 0-2 (female): Negative screen for alcohol misuse    Single Item Drug Screening:  How often have you used an illegal drug (including marijuana) or a prescription medication for non-medical reasons in the past year? never    Single Item Drug Screen Score: 0  Interpretation: Negative screen for possible drug use disorder    Brief Intervention  Alcohol & drug use screenings were reviewed. No concerns regarding substance use disorder identified.     Other Counseling Topics:   Calcium and vitamin D intake and regular weightbearing exercise.     Social Drivers of Health     Financial Resource Strain: Low Risk  (2024)    Overall Financial Resource Strain (CARDIA)     Difficulty of Paying Living Expenses: Not hard at all   Food Insecurity: No Food Insecurity (2025)    Hunger Vital Sign     Worried About Running Out of Food in the Last Year: Never true     Ran Out of Food in the Last Year: Never true   Transportation Needs: No Transportation Needs (2025)    PRAPARE - Transportation     Lack of Transportation (Medical): No     Lack of Transportation (Non-Medical): No   Housing Stability: Unknown (2025)    Housing Stability Vital Sign     Unable to Pay for Housing in the Last Year: No     Homeless in the Last Year: No   Utilities: Not At Risk (2025)    [facility] Utilities     Threatened with loss of utilities: No  "        Objective     /76 (BP Location: Left arm, Patient Position: Sitting, Cuff Size: Standard)   Pulse 98   Temp 97.5 °F (36.4 °C) (Temporal)   Resp 17   Ht 5' 6\" (1.676 m)   Wt 89.4 kg (197 lb)   SpO2 99%   BMI 31.80 kg/m²        Wt Readings from Last 3 Encounters:   02/18/25 89.4 kg (197 lb)   01/10/25 91.6 kg (202 lb)   10/24/24 98 kg (216 lb)         Physical Exam  Constitutional:       General: She is not in acute distress.  HENT:      Right Ear: Tympanic membrane and ear canal normal.      Left Ear: Tympanic membrane and ear canal normal.   Eyes:      General: No scleral icterus.     Extraocular Movements: Extraocular movements intact.      Conjunctiva/sclera: Conjunctivae normal.      Pupils: Pupils are equal, round, and reactive to light.   Neck:      Thyroid: No thyroid mass or thyromegaly.      Vascular: Normal carotid pulses. No carotid bruit.   Cardiovascular:      Rate and Rhythm: Normal rate and regular rhythm.      Heart sounds: No murmur heard.     No gallop.   Pulmonary:      Effort: Pulmonary effort is normal.      Breath sounds: Normal breath sounds. No wheezing or rales.   Abdominal:      General: Bowel sounds are normal. There is no distension.      Palpations: Abdomen is soft. There is no hepatomegaly, splenomegaly or mass.      Tenderness: There is no abdominal tenderness. There is no guarding or rebound.   Musculoskeletal:      Right lower leg: No edema.      Left lower leg: No edema.   Lymphadenopathy:      Cervical: No cervical adenopathy.      Upper Body:      Right upper body: No supraclavicular adenopathy.      Left upper body: No supraclavicular adenopathy.   Skin:     Findings: No rash.   Neurological:      General: No focal deficit present.      Mental Status: She is alert and oriented to person, place, and time.   Psychiatric:         Mood and Affect: Mood normal.         Behavior: Behavior normal.         "

## 2025-02-18 NOTE — ASSESSMENT & PLAN NOTE
A1c 5.6 decreased from 5.8 last year     Lab Results   Component Value Date    HGBA1C 5.6 09/05/2024

## 2025-02-18 NOTE — ASSESSMENT & PLAN NOTE
Lab Results   Component Value Date    ALT 44 (H) 09/05/2024    AST 34 09/05/2024    ALKPHOS 80 06/20/2020    BILITOT 0.5 01/20/2017

## 2025-02-18 NOTE — ASSESSMENT & PLAN NOTE
On Levothyroxine 175 mcg daily     Lab Results   Component Value Date    [id number] 1.130 01/20/2017    TSH 0.519 08/09/2024       Orders:    levothyroxine 175 mcg tablet; Take 1 tablet (175 mcg total) by mouth daily in the early morning

## 2025-02-18 NOTE — ASSESSMENT & PLAN NOTE
Hyperlipidemia mixed type on Pravastatin 20 mg daily.    Lab Results   Component Value Date    CHOLESTEROL 176 09/05/2024    CHOLESTEROL 186 02/16/2024    CHOLESTEROL 181 02/14/2023     Lab Results   Component Value Date    HDL 56 09/05/2024    HDL 62 02/16/2024    HDL 54 02/14/2023     Lab Results   Component Value Date    TRIG 202 (H) 09/05/2024    TRIG 229 (H) 02/16/2024    TRIG 239 (H) 02/14/2023     Lab Results   Component Value Date    LDLCALC 86 09/05/2024         Orders:    pravastatin (PRAVACHOL) 20 mg tablet; Take 1 tablet (20 mg total) by mouth daily

## 2025-02-18 NOTE — PROGRESS NOTES
Name: Lili M Mortimer      : 1955      MRN: [id number]  Encounter Provider: Joshua Vazquez MD  Encounter Date: 2025   Encounter department: [facility]    Assessment & Plan       Preventive health issues were discussed with patient, and age appropriate screening tests were ordered as noted in patient's After Visit Summary. Personalized health advice and appropriate referrals for health education or preventive services given if needed, as noted in patient's After Visit Summary.    History of Present Illness   {?Quick Links Encounters * My Last Note * Last Note in Specialty * Snapshot * Since Last Visit * History :07001}  HPI   Patient Care Team:  Joshua Vazquez MD as PCP - General  MD Boyd Givens MD as Endoscopist    Review of Systems  Medical History Reviewed by provider this encounter:       Annual Wellness Visit Questionnaire   Megan is here for her Subsequent Wellness visit. Last Medicare Wellness visit information reviewed, patient interviewed and updates made to the record today.      Health Risk Assessment:   Patient rates overall health as good. Patient feels that their physical health rating is much better. Patient is very satisfied with their life. Eyesight was rated as same. Hearing was rated as same. Patient feels that their emotional and mental health rating is same. Patients states they are never, rarely angry. Patient states they are never, rarely unusually tired/fatigued. Pain experienced in the last 7 days has been none. Patient states that she has experienced no weight loss or gain in last 6 months.     Depression Screening:   PHQ-9 Score: 0      Fall Risk Screening:   In the past year, patient has experienced: no history of falling in past year      Urinary Incontinence Screening:   Patient has not leaked urine accidently in the last six months.     Home Safety:  Patient does not have trouble with stairs inside or outside of their home.  Patient has working smoke alarms and has working carbon monoxide detector. Home safety hazards include: none.     Nutrition:   Current diet is Regular.     Medications:   Patient is not currently taking any over-the-counter supplements. Patient is able to manage medications.     Activities of Daily Living (ADLs)/Instrumental Activities of Daily Living (IADLs):   Walk and transfer into and out of bed and chair?: Yes  Dress and groom yourself?: Yes    Bathe or shower yourself?: Yes    Feed yourself? Yes  Do your laundry/housekeeping?: Yes  Manage your money, pay your bills and track your expenses?: Yes  Make your own meals?: Yes    Do your own shopping?: Yes    Durable Medical Equipment Suppliers  [employer]    Previous Hospitalizations:   Any hospitalizations or ED visits within the last 12 months?: No      Advance Care Planning:   Living will: Yes    Durable POA for healthcare: Yes    Advanced directive: Yes      PREVENTIVE SCREENINGS      Cardiovascular Screening:    General: Screening Not Indicated and History Lipid Disorder      Diabetes Screening:     General: Screening Current      Colorectal Cancer Screening:     General: Screening Current      Breast Cancer Screening:     General: Screening Current      Cervical Cancer Screening:    General: Screening Not Indicated      Lung Cancer Screening:     General: Screening Not Indicated      Hepatitis C Screening:    General: Screening Current    Screening, Brief Intervention, and Referral to Treatment (SBIRT)     Screening  Typical number of drinks in a day: 0  Typical number of drinks in a week: 0  Interpretation: Low risk drinking behavior.    AUDIT-C Screenin) How often did you have a drink containing alcohol in the past year? monthly or less  2) How many drinks did you have on a typical day when you were drinking in the past year? 0  3) How often did you have 6 or more drinks on one occasion in the past year? never    AUDIT-C Score: 1  Interpretation: Score 0-2  (female): Negative screen for alcohol misuse    Single Item Drug Screening:  How often have you used an illegal drug (including marijuana) or a prescription medication for non-medical reasons in the past year? never    Single Item Drug Screen Score: 0  Interpretation: Negative screen for possible drug use disorder    Social Drivers of Health     Financial Resource Strain: Low Risk  (2/7/2024)    Overall Financial Resource Strain (CARDIA)     Difficulty of Paying Living Expenses: Not hard at all   Food Insecurity: No Food Insecurity (2/13/2025)    Hunger Vital Sign     Worried About Running Out of Food in the Last Year: Never true     Ran Out of Food in the Last Year: Never true   Transportation Needs: No Transportation Needs (2/13/2025)    PRAPARE - Transportation     Lack of Transportation (Medical): No     Lack of Transportation (Non-Medical): No   Housing Stability: Unknown (2/13/2025)    Housing Stability Vital Sign     Unable to Pay for Housing in the Last Year: No     Homeless in the Last Year: No   Utilities: Not At Risk (2/13/2025)    [facility] Utilities     Threatened with loss of utilities: No     No results found.    Objective {?Quick Links Trend Vitals * Enter New Vitals * Results Review * Timeline (Adult) * Labs * Imaging * Cardiology * Procedures * Lung Cancer Screening * Surgical eConsent :43644}  There were no vitals taken for this visit.    Physical Exam  {Administrative / Billing Section (Optional):96222}

## 2025-02-18 NOTE — ASSESSMENT & PLAN NOTE
Longstanding history of depression stable on Wellbutrin  mg/day and Prozac 20 mg daily. History of alcoholism with prior in patient rehab and completion of a partial out program     Orders:    buPROPion (WELLBUTRIN XL) 300 mg 24 hr tablet; Take 1 tablet (300 mg total) by mouth daily    FLUoxetine (PROzac) 20 mg capsule; Take 1 capsule (20 mg total) by mouth daily

## 2025-02-18 NOTE — PATIENT INSTRUCTIONS
Medicare Preventive Visit Patient Instructions  Thank you for completing your Welcome to Medicare Visit or Medicare Annual Wellness Visit today. Your next wellness visit will be due in one year (2/19/2026).  The screening/preventive services that you may require over the next 5-10 years are detailed below. Some tests may not apply to you based off risk factors and/or age. Screening tests ordered at today's visit but not completed yet may show as past due. Also, please note that scanned in results may not display below.  Preventive Screenings:  Service Recommendations Previous Testing/Comments   Colorectal Cancer Screening  * Colonoscopy    * Fecal Occult Blood Test (FOBT)/Fecal Immunochemical Test (FIT)  * Fecal DNA/Cologuard Test  * Flexible Sigmoidoscopy Age: 45-75 years old   Colonoscopy: every 10 years (may be performed more frequently if at higher risk)  OR  FOBT/FIT: every 1 year  OR  Cologuard: every 3 years  OR  Sigmoidoscopy: every 5 years  Screening may be recommended earlier than age 45 if at higher risk for colorectal cancer. Also, an individualized decision between you and your healthcare provider will decide whether screening between the ages of 76-85 would be appropriate. Colonoscopy: 03/27/2023  FOBT/FIT: Not on file  Cologuard: Not on file  Sigmoidoscopy: Not on file    Screening Current     Breast Cancer Screening Age: 40+ years old  Frequency: every 1-2 years  Not required if history of left and right mastectomy Mammogram: 10/09/2024    Screening Current   Cervical Cancer Screening Between the ages of 21-29, pap smear recommended once every 3 years.   Between the ages of 30-65, can perform pap smear with HPV co-testing every 5 years.   Recommendations may differ for women with a history of total hysterectomy, cervical cancer, or abnormal pap smears in past. Pap Smear: 10/24/2024    Screening Not Indicated   Hepatitis C Screening Once for adults born between 1945 and 1965  More frequently in  patients at high risk for Hepatitis C Hep C Antibody: 09/05/2024    Screening Current   Diabetes Screening 1-2 times per year if you're at risk for diabetes or have pre-diabetes Fasting glucose: No results in last 5 years (No results in last 5 years)  A1C: 5.6 % (9/5/2024)  Screening Current   Cholesterol Screening Once every 5 years if you don't have a lipid disorder. May order more often based on risk factors. Lipid panel: 09/05/2024    Screening Not Indicated  History Lipid Disorder     Other Preventive Screenings Covered by Medicare:  Abdominal Aortic Aneurysm (AAA) Screening: covered once if your at risk. You're considered to be at risk if you have a family history of AAA.  Lung Cancer Screening: covers low dose CT scan once per year if you meet all of the following conditions: (1) Age 55-77; (2) No signs or symptoms of lung cancer; (3) Current smoker or have quit smoking within the last 15 years; (4) You have a tobacco smoking history of at least 20 pack years (packs per day multiplied by number of years you smoked); (5) You get a written order from a healthcare provider.  Glaucoma Screening: covered annually if you're considered high risk: (1) You have diabetes OR (2) Family history of glaucoma OR (3)  aged 50 and older OR (4)  American aged 65 and older  Osteoporosis Screening: covered every 2 years if you meet one of the following conditions: (1) You're estrogen deficient and at risk for osteoporosis based off medical history and other findings; (2) Have a vertebral abnormality; (3) On glucocorticoid therapy for more than 3 months; (4) Have primary hyperparathyroidism; (5) On osteoporosis medications and need to assess response to drug therapy.   Last bone density test (DXA Scan): 03/01/2021.  HIV Screening: covered annually if you're between the age of 15-65. Also covered annually if you are younger than 15 and older than 65 with risk factors for HIV infection. For pregnant  patients, it is covered up to 3 times per pregnancy.    Immunizations:  Immunization Recommendations   Influenza Vaccine Annual influenza vaccination during flu season is recommended for all persons aged >= 6 months who do not have contraindications   Pneumococcal Vaccine   * Pneumococcal conjugate vaccine = PCV13 (Prevnar 13), PCV15 (Vaxneuvance), PCV20 (Prevnar 20)  * Pneumococcal polysaccharide vaccine = PPSV23 (Pneumovax) Adults 19-65 yo with certain risk factors or if 65+ yo  If never received any pneumonia vaccine: recommend Prevnar 20 (PCV20)  Give PCV20 if previously received 1 dose of PCV13 or PPSV23   Hepatitis B Vaccine 3 dose series if at intermediate or high risk (ex: diabetes, end stage renal disease, liver disease)   Respiratory syncytial virus (RSV) Vaccine - COVERED BY MEDICARE PART D  * RSVPreF3 (Arexvy) CDC recommends that adults 60 years of age and older may receive a single dose of RSV vaccine using shared clinical decision-making (SCDM)   Tetanus (Td) Vaccine - COST NOT COVERED BY MEDICARE PART B Following completion of primary series, a booster dose should be given every 10 years to maintain immunity against tetanus. Td may also be given as tetanus wound prophylaxis.   Tdap Vaccine - COST NOT COVERED BY MEDICARE PART B Recommended at least once for all adults. For pregnant patients, recommended with each pregnancy.   Shingles Vaccine (Shingrix) - COST NOT COVERED BY MEDICARE PART B  2 shot series recommended in those 19 years and older who have or will have weakened immune systems or those 50 years and older     Health Maintenance Due:      Topic Date Due   • Breast Cancer Screening: Mammogram  10/09/2025   • Colorectal Cancer Screening  03/27/2028   • Hepatitis C Screening  Completed     Immunizations Due:      Topic Date Due   • Pneumococcal Vaccine: 65+ Years (2 of 2 - PCV) 02/10/2023   • COVID-19 Vaccine (8 - 2024-25 season) 09/01/2024     Advance Directives   What are advance directives?   Advance directives are legal documents that state your wishes and plans for medical care. These plans are made ahead of time in case you lose your ability to make decisions for yourself. Advance directives can apply to any medical decision, such as the treatments you want, and if you want to donate organs.   What are the types of advance directives?  There are many types of advance directives, and each state has rules about how to use them. You may choose a combination of any of the following:  Living will:  This is a written record of the treatment you want. You can also choose which treatments you do not want, which to limit, and which to stop at a certain time. This includes surgery, medicine, IV fluid, and tube feedings.   Durable power of  for healthcare (DPAHC):  This is a written record that states who you want to make healthcare choices for you when you are unable to make them for yourself. This person, called a proxy, is usually a family member or a friend. You may choose more than 1 proxy.  Do not resuscitate (DNR) order:  A DNR order is used in case your heart stops beating or you stop breathing. It is a request not to have certain forms of treatment, such as CPR. A DNR order may be included in other types of advance directives.  Medical directive:  This covers the care that you want if you are in a coma, near death, or unable to make decisions for yourself. You can list the treatments you want for each condition. Treatment may include pain medicine, surgery, blood transfusions, dialysis, IV or tube feedings, and a ventilator (breathing machine).  Values history:  This document has questions about your views, beliefs, and how you feel and think about life. This information can help others choose the care that you would choose.  Why are advance directives important?  An advance directive helps you control your care. Although spoken wishes may be used, it is better to have your wishes written down.  Spoken wishes can be misunderstood, or not followed. Treatments may be given even if you do not want them. An advance directive may make it easier for your family to make difficult choices about your care.   Weight Management   Why it is important to manage your weight:  Being overweight increases your risk of health conditions such as heart disease, high blood pressure, type 2 diabetes, and certain types of cancer. It can also increase your risk for osteoarthritis, sleep apnea, and other respiratory problems. Aim for a slow, steady weight loss. Even a small amount of weight loss can lower your risk of health problems.  How to lose weight safely:  A safe and healthy way to lose weight is to eat fewer calories and get regular exercise. You can lose up about 1 pound a week by decreasing the number of calories you eat by 500 calories each day.   Healthy meal plan for weight management:  A healthy meal plan includes a variety of foods, contains fewer calories, and helps you stay healthy. A healthy meal plan includes the following:  Eat whole-grain foods more often.  A healthy meal plan should contain fiber. Fiber is the part of grains, fruits, and vegetables that is not broken down by your body. Whole-grain foods are healthy and provide extra fiber in your diet. Some examples of whole-grain foods are whole-wheat breads and pastas, oatmeal, brown rice, and bulgur.  Eat a variety of vegetables every day.  Include dark, leafy greens such as spinach, kale, umesh greens, and mustard greens. Eat yellow and orange vegetables such as carrots, sweet potatoes, and winter squash.   Eat a variety of fruits every day.  Choose fresh or canned fruit (canned in its own juice or light syrup) instead of juice. Fruit juice has very little or no fiber.  Eat low-fat dairy foods.  Drink fat-free (skim) milk or 1% milk. Eat fat-free yogurt and low-fat cottage cheese. Try low-fat cheeses such as mozzarella and other reduced-fat  cheeses.  Choose meat and other protein foods that are low in fat.  Choose beans or other legumes such as split peas or lentils. Choose fish, skinless poultry (chicken or turkey), or lean cuts of red meat (beef or pork). Before you cook meat or poultry, cut off any visible fat.   Use less fat and oil.  Try baking foods instead of frying them. Add less fat, such as margarine, sour cream, regular salad dressing and mayonnaise to foods. Eat fewer high-fat foods. Some examples of high-fat foods include french fries, doughnuts, ice cream, and cakes.  Eat fewer sweets.  Limit foods and drinks that are high in sugar. This includes candy, cookies, regular soda, and sweetened drinks.  Exercise:  Exercise at least 30 minutes per day on most days of the week. Some examples of exercise include walking, biking, dancing, and swimming. You can also fit in more physical activity by taking the stairs instead of the elevator or parking farther away from stores. Ask your healthcare provider about the best exercise plan for you.      © Copyright [employer] 2018 Information is for End User's use only and may not be sold, redistributed or otherwise used for commercial purposes. All illustrations and images included in CareNotes® are the copyrighted property of [employer].A[employer]M., Inc[employer] or [employer]      Medicare Preventive Visit Patient Instructions  Thank you for completing your Welcome to Medicare Visit or Medicare Annual Wellness Visit today. Your next wellness visit will be due in one year (2/19/2026).  The screening/preventive services that you may require over the next 5-10 years are detailed below. Some tests may not apply to you based off risk factors and/or age. Screening tests ordered at today's visit but not completed yet may show as past due. Also, please note that scanned in results may not display below.  Preventive Screenings:  Service Recommendations Previous Testing/Comments   Colorectal Cancer Screening  * Colonoscopy     * Fecal Occult Blood Test (FOBT)/Fecal Immunochemical Test (FIT)  * Fecal DNA/Cologuard Test  * Flexible Sigmoidoscopy Age: 45-75 years old   Colonoscopy: every 10 years (may be performed more frequently if at higher risk)  OR  FOBT/FIT: every 1 year  OR  Cologuard: every 3 years  OR  Sigmoidoscopy: every 5 years  Screening may be recommended earlier than age 45 if at higher risk for colorectal cancer. Also, an individualized decision between you and your healthcare provider will decide whether screening between the ages of 76-85 would be appropriate. Colonoscopy: 03/27/2023  FOBT/FIT: Not on file  Cologuard: Not on file  Sigmoidoscopy: Not on file    Screening Current     Breast Cancer Screening Age: 40+ years old  Frequency: every 1-2 years  Not required if history of left and right mastectomy Mammogram: 10/09/2024    Screening Current   Cervical Cancer Screening Between the ages of 21-29, pap smear recommended once every 3 years.   Between the ages of 30-65, can perform pap smear with HPV co-testing every 5 years.   Recommendations may differ for women with a history of total hysterectomy, cervical cancer, or abnormal pap smears in past. Pap Smear: 10/24/2024    Screening Not Indicated   Hepatitis C Screening Once for adults born between 1945 and 1965  More frequently in patients at high risk for Hepatitis C Hep C Antibody: 09/05/2024    Screening Current   Diabetes Screening 1-2 times per year if you're at risk for diabetes or have pre-diabetes Fasting glucose: No results in last 5 years (No results in last 5 years)  A1C: 5.6 % (9/5/2024)  Screening Current   Cholesterol Screening Once every 5 years if you don't have a lipid disorder. May order more often based on risk factors. Lipid panel: 09/05/2024    Screening Not Indicated  History Lipid Disorder     Other Preventive Screenings Covered by Medicare:  Abdominal Aortic Aneurysm (AAA) Screening: covered once if your at risk. You're considered to be at risk  if you have a family history of AAA.  Lung Cancer Screening: covers low dose CT scan once per year if you meet all of the following conditions: (1) Age 55-77; (2) No signs or symptoms of lung cancer; (3) Current smoker or have quit smoking within the last 15 years; (4) You have a tobacco smoking history of at least 20 pack years (packs per day multiplied by number of years you smoked); (5) You get a written order from a healthcare provider.  Glaucoma Screening: covered annually if you're considered high risk: (1) You have diabetes OR (2) Family history of glaucoma OR (3)  aged 50 and older OR (4)  American aged 65 and older  Osteoporosis Screening: covered every 2 years if you meet one of the following conditions: (1) You're estrogen deficient and at risk for osteoporosis based off medical history and other findings; (2) Have a vertebral abnormality; (3) On glucocorticoid therapy for more than 3 months; (4) Have primary hyperparathyroidism; (5) On osteoporosis medications and need to assess response to drug therapy.   Last bone density test (DXA Scan): 03/01/2021.  HIV Screening: covered annually if you're between the age of 15-65. Also covered annually if you are younger than 15 and older than 65 with risk factors for HIV infection. For pregnant patients, it is covered up to 3 times per pregnancy.    Immunizations:  Immunization Recommendations   Influenza Vaccine Annual influenza vaccination during flu season is recommended for all persons aged >= 6 months who do not have contraindications   Pneumococcal Vaccine   * Pneumococcal conjugate vaccine = PCV13 (Prevnar 13), PCV15 (Vaxneuvance), PCV20 (Prevnar 20)  * Pneumococcal polysaccharide vaccine = PPSV23 (Pneumovax) Adults 19-65 yo with certain risk factors or if 65+ yo  If never received any pneumonia vaccine: recommend Prevnar 20 (PCV20)  Give PCV20 if previously received 1 dose of PCV13 or PPSV23   Hepatitis B Vaccine 3 dose series if at  intermediate or high risk (ex: diabetes, end stage renal disease, liver disease)   Respiratory syncytial virus (RSV) Vaccine - COVERED BY MEDICARE PART D  * RSVPreF3 (Arexvy) CDC recommends that adults 60 years of age and older may receive a single dose of RSV vaccine using shared clinical decision-making (SCDM)   Tetanus (Td) Vaccine - COST NOT COVERED BY MEDICARE PART B Following completion of primary series, a booster dose should be given every 10 years to maintain immunity against tetanus. Td may also be given as tetanus wound prophylaxis.   Tdap Vaccine - COST NOT COVERED BY MEDICARE PART B Recommended at least once for all adults. For pregnant patients, recommended with each pregnancy.   Shingles Vaccine (Shingrix) - COST NOT COVERED BY MEDICARE PART B  2 shot series recommended in those 19 years and older who have or will have weakened immune systems or those 50 years and older     Health Maintenance Due:      Topic Date Due   • Breast Cancer Screening: Mammogram  10/09/2025   • Colorectal Cancer Screening  03/27/2028   • Hepatitis C Screening  Completed     Immunizations Due:      Topic Date Due   • Pneumococcal Vaccine: 65+ Years (2 of 2 - PCV) 02/10/2023   • COVID-19 Vaccine (8 - 2024-25 season) 09/01/2024     Advance Directives   What are advance directives?  Advance directives are legal documents that state your wishes and plans for medical care. These plans are made ahead of time in case you lose your ability to make decisions for yourself. Advance directives can apply to any medical decision, such as the treatments you want, and if you want to donate organs.   What are the types of advance directives?  There are many types of advance directives, and each state has rules about how to use them. You may choose a combination of any of the following:  Living will:  This is a written record of the treatment you want. You can also choose which treatments you do not want, which to limit, and which to stop at a  certain time. This includes surgery, medicine, IV fluid, and tube feedings.   Durable power of  for healthcare (DPAHC):  This is a written record that states who you want to make healthcare choices for you when you are unable to make them for yourself. This person, called a proxy, is usually a family member or a friend. You may choose more than 1 proxy.  Do not resuscitate (DNR) order:  A DNR order is used in case your heart stops beating or you stop breathing. It is a request not to have certain forms of treatment, such as CPR. A DNR order may be included in other types of advance directives.  Medical directive:  This covers the care that you want if you are in a coma, near death, or unable to make decisions for yourself. You can list the treatments you want for each condition. Treatment may include pain medicine, surgery, blood transfusions, dialysis, IV or tube feedings, and a ventilator (breathing machine).  Values history:  This document has questions about your views, beliefs, and how you feel and think about life. This information can help others choose the care that you would choose.  Why are advance directives important?  An advance directive helps you control your care. Although spoken wishes may be used, it is better to have your wishes written down. Spoken wishes can be misunderstood, or not followed. Treatments may be given even if you do not want them. An advance directive may make it easier for your family to make difficult choices about your care.   Weight Management   Why it is important to manage your weight:  Being overweight increases your risk of health conditions such as heart disease, high blood pressure, type 2 diabetes, and certain types of cancer. It can also increase your risk for osteoarthritis, sleep apnea, and other respiratory problems. Aim for a slow, steady weight loss. Even a small amount of weight loss can lower your risk of health problems.  How to lose weight safely:  A  safe and healthy way to lose weight is to eat fewer calories and get regular exercise. You can lose up about 1 pound a week by decreasing the number of calories you eat by 500 calories each day.   Healthy meal plan for weight management:  A healthy meal plan includes a variety of foods, contains fewer calories, and helps you stay healthy. A healthy meal plan includes the following:  Eat whole-grain foods more often.  A healthy meal plan should contain fiber. Fiber is the part of grains, fruits, and vegetables that is not broken down by your body. Whole-grain foods are healthy and provide extra fiber in your diet. Some examples of whole-grain foods are whole-wheat breads and pastas, oatmeal, brown rice, and bulgur.  Eat a variety of vegetables every day.  Include dark, leafy greens such as spinach, kale, umesh greens, and mustard greens. Eat yellow and orange vegetables such as carrots, sweet potatoes, and winter squash.   Eat a variety of fruits every day.  Choose fresh or canned fruit (canned in its own juice or light syrup) instead of juice. Fruit juice has very little or no fiber.  Eat low-fat dairy foods.  Drink fat-free (skim) milk or 1% milk. Eat fat-free yogurt and low-fat cottage cheese. Try low-fat cheeses such as mozzarella and other reduced-fat cheeses.  Choose meat and other protein foods that are low in fat.  Choose beans or other legumes such as split peas or lentils. Choose fish, skinless poultry (chicken or turkey), or lean cuts of red meat (beef or pork). Before you cook meat or poultry, cut off any visible fat.   Use less fat and oil.  Try baking foods instead of frying them. Add less fat, such as margarine, sour cream, regular salad dressing and mayonnaise to foods. Eat fewer high-fat foods. Some examples of high-fat foods include french fries, doughnuts, ice cream, and cakes.  Eat fewer sweets.  Limit foods and drinks that are high in sugar. This includes candy, cookies, regular soda, and  sweetened drinks.  Exercise:  Exercise at least 30 minutes per day on most days of the week. Some examples of exercise include walking, biking, dancing, and swimming. You can also fit in more physical activity by taking the stairs instead of the elevator or parking farther away from stores. Ask your healthcare provider about the best exercise plan for you.      © Copyright [employer] 2018 Information is for End User's use only and may not be sold, redistributed or otherwise used for commercial purposes. All illustrations and images included in CareNotes® are the copyrighted property of [employer] Inc[employer] or [employer]

## 2025-02-26 DIAGNOSIS — E66.01 OBESITY, MORBID (HCC): Primary | ICD-10-CM

## 2025-02-27 ENCOUNTER — PATIENT MESSAGE (OUTPATIENT)
Dept: FAMILY MEDICINE CLINIC | Facility: CLINIC | Age: 70
End: 2025-02-27

## 2025-03-03 ENCOUNTER — TELEPHONE (OUTPATIENT)
Dept: FAMILY MEDICINE CLINIC | Facility: CLINIC | Age: 70
End: 2025-03-03

## 2025-03-03 DIAGNOSIS — E66.9 OBESITY (BMI 30-39.9): Primary | ICD-10-CM

## 2025-03-03 NOTE — TELEPHONE ENCOUNTER
I called and spoke with patient today-both Zepbound and Wegovy were not covered by her insurance.  Saxenda will be covered with preauthorization.  I explained the difference between these medications Saxenda is a daily injection with a weekly titration dosage schedule.  Prescription for this sent to her local pharmacy

## 2025-03-05 ENCOUNTER — TELEPHONE (OUTPATIENT)
Age: 70
End: 2025-03-05

## 2025-03-06 NOTE — TELEPHONE ENCOUNTER
JAYLA ANTOINE APPROVED         Patient advised by          []MyChart Message  [x]Phone call   []LMOM  []L/M to call office as no active Communication consent on file  []Unable to leave detailed message as VM not approved on Communication consent       Pharmacy advised by    [x]Fax  []Phone call  []Secure Chat    Specialty Pharmacy    []        
JAYLA ANTOINE SUBMITTED     to [email or web address]     via    []CMM-KEY:    [x]Surescripts-Case ID # [id number]  []Availity-Auth ID #  NDC #    []Faxed to plan   []Other website    []Phone call Case ID #      [x]PA sent as URGENT    All office notes, labs and other pertaining documents and studies sent. Clinical questions answered. Awaiting determination from insurance company.     Turnaround time for your insurance to make a decision on your Prior Authorization can take 7-21 business days.               
Patient received medication was given temp supply but needs needles ordered for Saxenda BD Alessandra 4ml needles. Please send to [facility].  
Recommendation Preamble: The following recommendations were made during the visit:
Detail Level: Zone
Render Risk Assessment In Note?: no

## 2025-03-07 DIAGNOSIS — E66.9 OBESITY (BMI 30-39.9): Primary | ICD-10-CM

## 2025-03-10 ENCOUNTER — OFFICE VISIT (OUTPATIENT)
Dept: PULMONOLOGY | Facility: CLINIC | Age: 70
End: 2025-03-10
Payer: COMMERCIAL

## 2025-03-10 VITALS
BODY MASS INDEX: 32.69 KG/M2 | DIASTOLIC BLOOD PRESSURE: 64 MMHG | HEART RATE: 90 BPM | TEMPERATURE: 96.6 F | SYSTOLIC BLOOD PRESSURE: 116 MMHG | OXYGEN SATURATION: 96 % | HEIGHT: 66 IN | WEIGHT: 203.4 LBS

## 2025-03-10 DIAGNOSIS — G47.33 OSA ON CPAP: Primary | ICD-10-CM

## 2025-03-10 DIAGNOSIS — E66.01 OBESITY, MORBID (HCC): ICD-10-CM

## 2025-03-10 PROCEDURE — G2211 COMPLEX E/M VISIT ADD ON: HCPCS | Performed by: INTERNAL MEDICINE

## 2025-03-10 PROCEDURE — 99214 OFFICE O/P EST MOD 30 MIN: CPT | Performed by: INTERNAL MEDICINE

## 2025-03-10 NOTE — ASSESSMENT & PLAN NOTE
She is on GLP-1 agonist currently her BMI is 32.83, this is likely contributing to her DIONE and will help to continue weight losing weight  Orders:    PAP DME Resupply/Reorder

## 2025-03-10 NOTE — PATIENT INSTRUCTIONS
"Patient Education     Sleep apnea in adults   The Basics   Written by the doctors and editors at [facility]   What is sleep apnea? -- Sleep apnea is a condition that makes you stop breathing for short periods while you are asleep. There are 2 types of sleep apnea. One is called \"obstructive sleep apnea.\" The other is called \"central sleep apnea.\"  In obstructive sleep apnea, you stop breathing because your throat narrows or closes (figure 1). In central sleep apnea, you stop breathing because your brain does not send the right signals to your muscles to make you breathe. When people talk about sleep apnea, they are usually referring to obstructive sleep apnea, which is what this article is about.  People with sleep apnea do not know that they stop breathing when they are asleep. But they do sometimes wake up startled or gasping for breath. They also often hear from loved ones that they snore.  What are the symptoms of sleep apnea? -- The main symptoms of sleep apnea are loud snoring, tiredness, and daytime sleepiness. Other symptoms can include:   Restless sleep   Waking up choking or gasping   Morning headaches, dry mouth, or sore throat   Waking up often to urinate   Waking up feeling unrested or groggy   Trouble thinking clearly or remembering things  Some people with sleep apnea don't have symptoms, or don't realize that they have them.  Should I see a doctor or nurse? -- Yes. If you think that you might have sleep apnea, see your doctor.  Is there a test for sleep apnea? -- Yes. First, your doctor or nurse will ask about your symptoms. If you have a bed partner, they might also ask that person if you snore or gasp in your sleep. If the doctor or nurse suspects that you have sleep apnea, they might send you for a \"sleep study.\"  Sleep studies can sometimes be done at home, but they are usually done in a sleep lab. For the study, you spend the night in the lab, and you are hooked up to different machines that " "monitor your heart rate, breathing, and other body functions. The results of the test tell your doctor or nurse if you have the disorder.  Is there anything I can do on my own to help my sleep apnea? -- Yes. Some things that might help:   Try to avoid sleeping on your back, if possible. This might help some people.   Lose weight, if you have excess body weight.   Get regular physical activity. This might help you lose weight. But even if it doesn't, being active is good for your health.   Avoid alcohol, especially in the evening. Alcohol can make sleep apnea worse.  How is sleep apnea treated? -- Treatment can include:   Weight loss - As mentioned above, weight loss can help if you have excess weight or obesity. But losing weight can be challenging, and it takes time to lose enough weight to help with your sleep apnea. Most people need other treatment while they work on losing weight.   CPAP - The most effective treatment for sleep apnea is a device that keeps your airway open while you sleep. Treatment with this device is called \"continuous positive airway pressure\" (\"CPAP\"). People getting CPAP wear a face mask at night that keeps them breathing (figure 2).  If your doctor or nurse recommends a CPAP machine, be patient about using it. The mask might seem uncomfortable to wear at first, and the machine might seem noisy, but using the machine can really help you. People with sleep apnea who use a CPAP machine feel more rested and generally feel better.  If CPAP does not work, your doctor might suggest other treatment. Options might include:   An oral device - This is a device that you wear in your mouth. It is called an \"oral appliance\" or \"mandibular advancement device.\" It helps keep your airway open while you sleep.   Hypoglossal nerve stimulation - This involves a procedure to implant a small device into your chest. The device has a wire that connects to the nerve under your tongue. While you are sleeping, it " sends an electrical signal that causes the tongue to push forward. This helps open up your airway.   Surgery to widen your airway - This might involve removing your tonsils or other tissue that blocks the airway.  Is sleep apnea dangerous? -- It can be. Risks include:   Accidents - People with sleep apnea do not get good-quality sleep, so they are often tired and not alert. This puts them at risk for car accidents and other types of accidents.   Other health problems - Studies show that people with sleep apnea are more likely than others to have high blood pressure, heart attacks, and other serious heart problems. Some people also have mood changes or depression.  In people with severe sleep apnea, getting treated (for example, with CPAP) can help lower these risks.  All topics are updated as new evidence becomes available and our peer review process is complete.  This topic retrieved from [employer] on: Feb 28, 2024.  Topic 84834 Version 18.0  Release: 32.2.4 - C32.58  © 2024 UpToDate, Inc. and/or its affiliates. All rights reserved.  figure 1: Airway in a person with sleep apnea     Normally, when a person sleeps, the airway remains open, and air can pass from the nose and mouth to the lungs. In a person with sleep apnea, parts of the throat and mouth drop into the airway and block off the flow of air. This can cause loud snoring and interrupt breathing for short periods.  Graphic 69320 Version 6.0  figure 2: Continuous positive airway pressure (CPAP) for sleep apnea     The CPAP mask gently blows air into your nose while you sleep. It puts just enough pressure on your airway to keep it from closing. The mask in this picture fits over just the nose. Other CPAP devices have masks that fit over the nose and mouth.  Graphic 17732 Version 5.0  Consumer Information Use and Disclaimer   Disclaimer: This generalized information is a limited summary of diagnosis, treatment, and/or medication information. It is not meant to  be comprehensive and should be used as a tool to help the user understand and/or assess potential diagnostic and treatment options. It does NOT include all information about conditions, treatments, medications, side effects, or risks that may apply to a specific patient. It is not intended to be medical advice or a substitute for the medical advice, diagnosis, or treatment of a health care provider based on the health care provider's examination and assessment of a patient's specific and unique circumstances. Patients must speak with a health care provider for complete information about their health, medical questions, and treatment options, including any risks or benefits regarding use of medications. This information does not endorse any treatments or medications as safe, effective, or approved for treating a specific patient. UpToDate, Inc. and its affiliates disclaim any warranty or liability relating to this information or the use thereof.The use of this information is governed by the Terms of Use, available at https://www.woltersNearlywedsuwer.com/en/know/clinical-effectiveness-terms. 2024© UpToDate, Inc. and its affiliates and/or licensors. All rights reserved.  Copyright   © 2024 UpToDate, Inc. and/or its affiliates. All rights reserved.

## 2025-03-10 NOTE — PROGRESS NOTES
Follow-up  Visit - Pulmonary Medicine   Name: Lili M Mortimer      : 1955      MRN: [id number]  Encounter Provider: Tl Garcia MD  Encounter Date: 3/10/2025   Encounter department: [facility]  :  Assessment & Plan  DIONE on CPAP  Severe DIONE diagnosed remotely reestablished recently in  AHI of 33.6 events per hour   VIPIN - Obdulia  She has a Emelia CPAP machine I could not obtain an official report however look that the provider access on the machine over the last month she is 100% using the machine averaging 10 hours, had 95th percentile pressure 7.5 cm H2O, residual AHI of 1.3, mask leak is 1.1 L/min-consistent with excellent compliance and adequate treatment of her DIONE    I spoke to her when she gets 5 years and this machine should be eligible for a new CPAP machine she will check this information with Obdulia    I reminded  the patient the pathophysiology of obstructive sleep apnea (IDONE), explaining that the condition involves intermittent blockage of the upper airway during sleep, which leads to brief pauses in breathing and drops in oxygen levels. These frequent episodes of hypoxia and disrupted sleep trigger an increase in sympathetic nervous system activity, which raises blood pressure and stresses the cardiovascular system. I emphasized that inadequately treated DIONE is strongly associated with a range of serious health consequences. Specifically, chronic untreated DIONE significantly raises the risk of developing hypertension, atrial fibrillation (A-fib), and heart failure due to the prolonged strain on the heart and blood vessels. I discussed how untreated DIONE is also a major risk factor for stroke, as intermittent hypoxia and elevated blood pressure can lead to the development of atherosclerosis and the formation of blood clots. Furthermore, I explained the growing body of evidence suggesting that untreated DIONE is linked to cognitive decline, including an increased risk  of early-onset dementia, particularly Alzheimer's disease. This is thought to result from the effects of chronic sleep disruption and hypoxia on brain function.     I stressed that effective management of DIONE, through adherence to CPAP/BiPAP therapy, and weight loss, are essential to reducing these risks.     She is satisfied with using CPAP, benefiting from it and will continue to use it    Orders:    PAP DME Resupply/Reorder    Obesity, morbid (HCC)  She is on GLP-1 agonist currently her BMI is 32.83, this is likely contributing to her DIONE and will help to continue weight losing weight  Orders:    PAP DME Resupply/Reorder      Return in about 1 year (around 3/10/2026).    History of Present Illness   Lili M Mortimer is a 69 y.o. female who presents for her annual visit she has history as detailed below of hypothyroidism depression and obstructive sleep apnea diagnosed remotely underwent a home sleep study in 2021 to reestablish her diagnosis restarted on CPAP got a new Emelia machine through the recall duration as she had a Shaila machine.       Aside from what is mentioned in the HPI, ROS is otherwise negative    Medical History Reviewed by provider this encounter:  Tobacco  Allergies  Meds  Problems  Med Hx  Surg Hx  Fam Hx     .  Past Medical History   Past Medical History:   Diagnosis Date    Anxiety     Cancer (HCC) 2004    Closed fracture of distal radius and ulna, left, initial encounter     Last Assessed:  5/13/16    CPAP (continuous positive airway pressure) dependence     Depression     Disease of thyroid gland     Fibromyalgia     GERD (gastroesophageal reflux disease)     Hyperlipidemia     Sleep apnea     pt uses c-pap    Sleep apnea, obstructive     Squamous cell carcinoma of skin     Last Assessed:  1/17/17     Past Surgical History:   Procedure Laterality Date    BREAST EXCISIONAL BIOPSY  1988    doesnt remember which breast and cant see a scar    COLON SURGERY      COLONOSCOPY N/A 07/14/2016     Procedure: COLONOSCOPY WITH ENDOCLIPS AND INJECTION FOR HEMOSTASIS;  Surgeon: Boyd Holt MD;  Location: [facility];  Service:     COLONOSCOPY  08/2019    FL INJECTION LEFT HIP (NON ARTHROGRAM)  09/15/2023    FL INJECTION LEFT HIP (NON ARTHROGRAM)  01/03/2024    FL INJECTION LEFT HIP (NON ARTHROGRAM)  10/30/2024    FL INJECTION LEFT HIP (NON ARTHROGRAM)  1/17/2025    FRACTURE SURGERY      MOHS RECONSTRUCTION      NASAL FRACTURE SURGERY      OK OPTX DSTL RADL I-ARTIC FX/EPIPHYSL SEP 3+ FRAG Left 02/25/2016    Procedure: OPEN REDUCTION W/ INTERNAL FIXATION (ORIF)DISTAL  RADIUS ;  Surgeon: Boyd Wagner MD;  Location: [facility];  Service: Orthopedics     Family History   Problem Relation Age of Onset    Cancer Mother         Liver cancer    Breast cancer Mother 55    Alcohol abuse Mother     Cancer Father         Colon    Colon cancer Father         Bladder cancer    Alcohol abuse Father     Cancer Sister     No Known Problems Daughter     No Known Problems Maternal Grandmother     No Known Problems Maternal Grandfather     No Known Problems Paternal Grandmother     No Known Problems Paternal Grandfather     No Known Problems Maternal Aunt     No Known Problems Paternal Aunt     No Known Problems Paternal Aunt     No Known Problems Paternal Aunt     No Known Problems Paternal Aunt     No Known Problems Paternal Aunt       reports that she quit smoking about 23 years ago. Her smoking use included cigarettes. She started smoking about 53 years ago. She has a 30 pack-year smoking history. She has been exposed to tobacco smoke. She has never used smokeless tobacco. She reports that she does not currently use alcohol. She reports that she does not use drugs.  Current Outpatient Medications   Medication Instructions    buPROPion (WELLBUTRIN XL) 300 mg, Oral, Daily    Calcium Carbonate-Vit D-Min (CALCIUM 1200 PO) Daily    cholecalciferol (VITAMIN D3) 25 mcg (1,000 units) tablet No dose, route, or frequency  recorded.    FLUoxetine (PROZAC) 20 mg, Oral, Daily    Insulin Pen Needle 32G X 4 MM MISC Does not apply, Daily    levothyroxine 175 mcg, Oral, Daily (early morning)    liraglutide (SAXENDA) injection Inject 0.1 mL (0.6 mg total) under the skin daily for 7 days, THEN 0.2 mL (1.2 mg total) daily for 7 days, THEN 0.3 mL (1.8 mg total) daily for 7 days, THEN 0.4 mL (2.4 mg total) daily for 7 days, THEN 0.5 mL (3 mg total) daily for 28 days.    Magnesium 200 MG TABS 1 tablet, Daily    methylPREDNISolone 4 MG tablet therapy pack Use as directed on package    Multiple Vitamins-Minerals (MULTIVITAMIN ADULT PO) 1 tablet, Daily    Omega-3 Fatty Acids (FISH OIL) 1000 MG CPDR 1 capsule, Daily    pravastatin (PRAVACHOL) 20 mg, Oral, Daily    vitamin E 100 UNIT capsule 1 capsule, Daily     Allergies   Allergen Reactions    Erythromycin Shortness Of Breath     Reaction Date: 20Oct2011;     Cefprozil      Reaction Date: 20Oct2011;     Cephalosporins       Current Outpatient Medications on File Prior to Visit   Medication Sig Dispense Refill    buPROPion (WELLBUTRIN XL) 300 mg 24 hr tablet Take 1 tablet (300 mg total) by mouth daily 90 tablet 3    Calcium Carbonate-Vit D-Min (CALCIUM 1200 PO) Take by mouth daily      cholecalciferol (VITAMIN D3) 25 mcg (1,000 units) tablet       FLUoxetine (PROzac) 20 mg capsule Take 1 capsule (20 mg total) by mouth daily 90 capsule 3    Insulin Pen Needle 32G X 4 MM MISC Use daily 100 each 1    levothyroxine 175 mcg tablet Take 1 tablet (175 mcg total) by mouth daily in the early morning 90 tablet 3    liraglutide (SAXENDA) injection Inject 0.1 mL (0.6 mg total) under the skin daily for 7 days, THEN 0.2 mL (1.2 mg total) daily for 7 days, THEN 0.3 mL (1.8 mg total) daily for 7 days, THEN 0.4 mL (2.4 mg total) daily for 7 days, THEN 0.5 mL (3 mg total) daily for 28 days. 15 mL 1    Magnesium 200 MG TABS Take 1 tablet by mouth daily      methylPREDNISolone 4 MG tablet therapy pack Use as directed on  "package 21 each 0    Multiple Vitamins-Minerals (MULTIVITAMIN ADULT PO) Take 1 tablet by mouth daily      Omega-3 Fatty Acids (FISH OIL) 1000 MG CPDR Take 1 capsule by mouth daily      pravastatin (PRAVACHOL) 20 mg tablet Take 1 tablet (20 mg total) by mouth daily 90 tablet 3    vitamin E 100 UNIT capsule Take 1 capsule by mouth daily       Current Facility-Administered Medications on File Prior to Visit   Medication Dose Route Frequency Provider Last Rate Last Admin    EPINEPHrine PF (ADRENALIN) 1 mg/mL injection 0.5 mg  0.5 mg Intramuscular Once Aida Urias MD          Social History     Tobacco Use    Smoking status: Former     Current packs/day: 0.00     Average packs/day: 1 pack/day for 30.0 years (30.0 ttl pk-yrs)     Types: Cigarettes     Start date: 1972     Quit date: 2002     Years since quittin.2     Passive exposure: Past    Smokeless tobacco: Never   Vaping Use    Vaping status: Never Used   Substance and Sexual Activity    Alcohol use: Not Currently    Drug use: No    Sexual activity: Not Currently     Partners: Male     Birth control/protection: Male Sterilization        Medical History Reviewed by provider this encounter:  Tobacco  Allergies  Meds  Problems  Med Hx  Surg Hx  Fam Hx     .    Objective   /64 (BP Location: Right arm, Patient Position: Sitting, Cuff Size: Standard)   Pulse 90   Temp (!) 96.6 °F (35.9 °C) (Tympanic Core)   Ht 5' 6\" (1.676 m)   Wt 92.3 kg (203 lb 6.4 oz)   SpO2 96%   BMI 32.83 kg/m²     Physical Exam  Constitutional:       Appearance: Normal appearance. She is not ill-appearing or diaphoretic.   HENT:      Head: Normocephalic and atraumatic.      Nose: No congestion or rhinorrhea.      Mouth/Throat:      Mouth: Mucous membranes are moist.      Pharynx: Oropharynx is clear.   Eyes:      General: No scleral icterus.        Right eye: No discharge.         Left eye: No discharge.      Extraocular Movements: Extraocular movements intact. "   Cardiovascular:      Rate and Rhythm: Normal rate and regular rhythm.      Pulses: Normal pulses.      Heart sounds: Normal heart sounds. No murmur heard.     No gallop.   Pulmonary:      Effort: Pulmonary effort is normal. No respiratory distress.      Breath sounds: Normal breath sounds. No wheezing, rhonchi or rales.   Musculoskeletal:         General: No swelling, tenderness or deformity.      Cervical back: No rigidity.   Skin:     General: Skin is warm and dry.   Neurological:      General: No focal deficit present.      Mental Status: She is alert and oriented to person, place, and time. Mental status is at baseline.   Psychiatric:         Mood and Affect: Mood normal.         Behavior: Behavior normal.         Thought Content: Thought content normal.         Judgment: Judgment normal.           Diagnostic Data:  Labs: I personally reviewed the most recent laboratory data pertinent to today's visit.        Other studies:  Home sleep study on 9/7/2021 which showed an LISA of 33.6 with oxygen merlin of 84%.     Compliance: over the last month she is 100% using the machine averaging 10 hours, had 95th percentile pressure 7.5 cm H2O, residual AHI of 1.3, mask leak is 1.1 L/min-consistent with excellent compliance and adequate treatment of her DIONE      Administrative Statements   I have spent a total time of 31 minutes in caring for this patient on the day of the visit/encounter including Risks and benefits of tx options, Importance of tx compliance, Documenting in the medical record, and Reviewing/placing orders in the medical record (including tests, medications, and/or procedures).      Tl Garcia MD

## 2025-03-10 NOTE — ASSESSMENT & PLAN NOTE
Severe DIONE diagnosed remotely reestablished recently in 2021 AHI of 33.6 events per hour   VIPIN - Obdulia  She has a Emelia CPAP machine I could not obtain an official report however look that the provider access on the machine over the last month she is 100% using the machine averaging 10 hours, had 95th percentile pressure 7.5 cm H2O, residual AHI of 1.3, mask leak is 1.1 L/min-consistent with excellent compliance and adequate treatment of her DIONE    I spoke to her when she gets 6 years and this machine should be eligible for a new CPAP machine she will check this information with Obdulia    I reminded  the patient the pathophysiology of obstructive sleep apnea (DIONE), explaining that the condition involves intermittent blockage of the upper airway during sleep, which leads to brief pauses in breathing and drops in oxygen levels. These frequent episodes of hypoxia and disrupted sleep trigger an increase in sympathetic nervous system activity, which raises blood pressure and stresses the cardiovascular system. I emphasized that inadequately treated DIONE is strongly associated with a range of serious health consequences. Specifically, chronic untreated DIONE significantly raises the risk of developing hypertension, atrial fibrillation (A-fib), and heart failure due to the prolonged strain on the heart and blood vessels. I discussed how untreated DIONE is also a major risk factor for stroke, as intermittent hypoxia and elevated blood pressure can lead to the development of atherosclerosis and the formation of blood clots. Furthermore, I explained the growing body of evidence suggesting that untreated DIONE is linked to cognitive decline, including an increased risk of early-onset dementia, particularly Alzheimer's disease. This is thought to result from the effects of chronic sleep disruption and hypoxia on brain function.     I stressed that effective management of DIONE, through adherence to CPAP/BiPAP therapy, and weight loss, are  essential to reducing these risks.     She is satisfied with using CPAP, benefiting from it and will continue to use it    Orders:    PAP DME Resupply/Reorder

## 2025-03-11 LAB

## 2025-04-26 DIAGNOSIS — E66.9 OBESITY (BMI 30-39.9): ICD-10-CM

## 2025-05-02 ENCOUNTER — OFFICE VISIT (OUTPATIENT)
Dept: OBGYN CLINIC | Facility: CLINIC | Age: 70
End: 2025-05-02
Payer: COMMERCIAL

## 2025-05-02 VITALS — HEIGHT: 66 IN | BODY MASS INDEX: 33.27 KG/M2 | WEIGHT: 207 LBS

## 2025-05-02 DIAGNOSIS — M16.12 PRIMARY OSTEOARTHRITIS OF ONE HIP, LEFT: ICD-10-CM

## 2025-05-02 DIAGNOSIS — M70.62 GREATER TROCHANTERIC BURSITIS OF LEFT HIP: Primary | ICD-10-CM

## 2025-05-02 DIAGNOSIS — M25.552 CHRONIC LEFT HIP PAIN: ICD-10-CM

## 2025-05-02 DIAGNOSIS — G89.29 CHRONIC LEFT HIP PAIN: ICD-10-CM

## 2025-05-02 DIAGNOSIS — E66.9 OBESITY (BMI 30-39.9): Primary | ICD-10-CM

## 2025-05-02 DIAGNOSIS — G47.33 OSA ON CPAP: ICD-10-CM

## 2025-05-02 PROCEDURE — 99213 OFFICE O/P EST LOW 20 MIN: CPT | Performed by: ORTHOPAEDIC SURGERY

## 2025-05-02 PROCEDURE — 20610 DRAIN/INJ JOINT/BURSA W/O US: CPT | Performed by: ORTHOPAEDIC SURGERY

## 2025-05-02 RX ORDER — LIRAGLUTIDE 6 MG/ML
INJECTION, SOLUTION SUBCUTANEOUS
Refills: 1 | OUTPATIENT
Start: 2025-05-02

## 2025-05-02 RX ORDER — KETOROLAC TROMETHAMINE 30 MG/ML
30 INJECTION, SOLUTION INTRAMUSCULAR; INTRAVENOUS
Status: COMPLETED | OUTPATIENT
Start: 2025-05-02 | End: 2025-05-02

## 2025-05-02 RX ORDER — BUPIVACAINE HYDROCHLORIDE 2.5 MG/ML
1 INJECTION, SOLUTION INFILTRATION; PERINEURAL
Status: COMPLETED | OUTPATIENT
Start: 2025-05-02 | End: 2025-05-02

## 2025-05-02 RX ORDER — TIRZEPATIDE 2.5 MG/.5ML
2.5 INJECTION, SOLUTION SUBCUTANEOUS WEEKLY
Qty: 2 ML | Refills: 0 | Status: SHIPPED | OUTPATIENT
Start: 2025-05-02 | End: 2025-05-30

## 2025-05-02 RX ORDER — METHYLPREDNISOLONE ACETATE 40 MG/ML
2 INJECTION, SUSPENSION INTRA-ARTICULAR; INTRALESIONAL; INTRAMUSCULAR; SOFT TISSUE
Status: COMPLETED | OUTPATIENT
Start: 2025-05-02 | End: 2025-05-02

## 2025-05-02 RX ADMIN — METHYLPREDNISOLONE ACETATE 2 ML: 40 INJECTION, SUSPENSION INTRA-ARTICULAR; INTRALESIONAL; INTRAMUSCULAR; SOFT TISSUE at 08:15

## 2025-05-02 RX ADMIN — KETOROLAC TROMETHAMINE 30 MG: 30 INJECTION, SOLUTION INTRAMUSCULAR; INTRAVENOUS at 08:15

## 2025-05-02 RX ADMIN — BUPIVACAINE HYDROCHLORIDE 1 ML: 2.5 INJECTION, SOLUTION INFILTRATION; PERINEURAL at 08:15

## 2025-05-02 NOTE — PROGRESS NOTES
Assessment:  Assessment & Plan  Greater trochanteric bursitis of left hip  On exam patient has tenderness on the lateral aspect of the hip.  Patient continues to get significant relief with cortisone injections.  Treatment options were discussed with patient that included continue with the injections, she tolerated the injection well.  Will follow-up with the patient in 3 months for reevaluation.       Primary osteoarthritis of one hip, left  On exam, she has limited motion with mild pain with internal and external.   She does have relief with periodic left IA injection and wishes to have an order in if she needs it.  We will obtain new xrays of the left hip at her next visit   Orders:    FL injection left hip (non arthrogram); Future    Chronic left hip pain       worsening left hip pain      To do next visit:  Return in about 14 weeks (around 8/8/2025) for left hip.    The above stated was discussed in layman's terms and the patient expressed understanding.  All questions were answered to the patient's satisfaction.       Scribe Attestation      I,:  Breanna Eller am acting as a scribe while in the presence of the attending physician.:       I,:  Hellen Bauer MD personally performed the services described in this documentation    as scribed in my presence.:               Subjective:   Lili M Mortimer is a 69 y.o. female who presents today for a follow-up for her left hip pain due to osteoarthritis along with greater trochanteric bursitis.  Patient has treated with injections for both, last trochanteric bursa injection was 10/14/2024 and her last left intra-articular hip injection was 1/17/2025.  Patient reports that most of her pain today is located on the lateral aspect of the hip and she would like to proceed with a bursa injection.      Review of systems negative unless otherwise specified in HPI  Review of Systems   Constitutional:  Negative for chills, fever and unexpected weight change.   HENT:   Negative for hearing loss, nosebleeds and sore throat.    Eyes:  Negative for pain, redness and visual disturbance.   Respiratory:  Negative for cough, shortness of breath and wheezing.    Cardiovascular:  Negative for chest pain, palpitations and leg swelling.   Gastrointestinal:  Negative for abdominal pain and nausea.   Genitourinary:  Negative for dyspareunia, dysuria and frequency.   Musculoskeletal:  Positive for arthralgias (Left hip joint).   Skin:  Negative for rash and wound.   Neurological:  Negative for dizziness, numbness and headaches.   Psychiatric/Behavioral:  Negative for decreased concentration and suicidal ideas. The patient is not nervous/anxious.        Past Medical History:   Diagnosis Date    Anxiety     Cancer (HCC) 2004    Closed fracture of distal radius and ulna, left, initial encounter     Last Assessed:  5/13/16    CPAP (continuous positive airway pressure) dependence     Depression     Disease of thyroid gland     Fibromyalgia     GERD (gastroesophageal reflux disease)     Hyperlipidemia     Sleep apnea     pt uses c-pap    Sleep apnea, obstructive     Squamous cell carcinoma of skin     Last Assessed:  1/17/17       Past Surgical History:   Procedure Laterality Date    BREAST EXCISIONAL BIOPSY  1988    doesnt remember which breast and cant see a scar    COLON SURGERY      COLONOSCOPY N/A 07/14/2016    Procedure: COLONOSCOPY WITH ENDOCLIPS AND INJECTION FOR HEMOSTASIS;  Surgeon: Boyd Holt MD;  Location: [facility];  Service:     COLONOSCOPY  08/2019    FL INJECTION LEFT HIP (NON ARTHROGRAM)  09/15/2023    FL INJECTION LEFT HIP (NON ARTHROGRAM)  01/03/2024    FL INJECTION LEFT HIP (NON ARTHROGRAM)  10/30/2024    FL INJECTION LEFT HIP (NON ARTHROGRAM)  1/17/2025    FRACTURE SURGERY      MOHS RECONSTRUCTION      NASAL FRACTURE SURGERY      WI OPTX DSTL RADL I-ARTIC FX/EPIPHYSL SEP 3+ FRAG Left 02/25/2016    Procedure: OPEN REDUCTION W/ INTERNAL FIXATION (ORIF)DISTAL  RADIUS ;   Surgeon: Boyd Wagner MD;  Location: [facility] OR;  Service: Orthopedics       Family History   Problem Relation Age of Onset    Cancer Mother         Liver cancer    Breast cancer Mother 55    Alcohol abuse Mother     Cancer Father         Colon    Colon cancer Father         Bladder cancer    Alcohol abuse Father     Cancer Sister     No Known Problems Daughter     No Known Problems Maternal Grandmother     No Known Problems Maternal Grandfather     No Known Problems Paternal Grandmother     No Known Problems Paternal Grandfather     No Known Problems Maternal Aunt     No Known Problems Paternal Aunt     No Known Problems Paternal Aunt     No Known Problems Paternal Aunt     No Known Problems Paternal Aunt     No Known Problems Paternal Aunt        Social History     Occupational History    Not on file   Tobacco Use    Smoking status: Former     Current packs/day: 0.00     Average packs/day: 1 pack/day for 30.0 years (30.0 ttl pk-yrs)     Types: Cigarettes     Start date: 1972     Quit date: 2002     Years since quittin.3     Passive exposure: Past    Smokeless tobacco: Never   Vaping Use    Vaping status: Never Used   Substance and Sexual Activity    Alcohol use: Not Currently    Drug use: No    Sexual activity: Not Currently     Partners: Male     Birth control/protection: Male Sterilization         Current Outpatient Medications:     Calcium Carbonate-Vit D-Min (CALCIUM 1200 PO), Take by mouth daily, Disp: , Rfl:     cholecalciferol (VITAMIN D3) 25 mcg (1,000 units) tablet, , Disp: , Rfl:     Insulin Pen Needle 32G X 4 MM MISC, Use daily, Disp: 100 each, Rfl: 1    levothyroxine 175 mcg tablet, Take 1 tablet (175 mcg total) by mouth daily in the early morning, Disp: 90 tablet, Rfl: 3    Magnesium 200 MG TABS, Take 1 tablet by mouth daily, Disp: , Rfl:     methylPREDNISolone 4 MG tablet therapy pack, Use as directed on package (Patient taking differently: Use as directed on package As needed), Disp:  21 each, Rfl: 0    Multiple Vitamins-Minerals (MULTIVITAMIN ADULT PO), Take 1 tablet by mouth daily, Disp: , Rfl:     Omega-3 Fatty Acids (FISH OIL) 1000 MG CPDR, Take 1 capsule by mouth daily, Disp: , Rfl:     pravastatin (PRAVACHOL) 20 mg tablet, Take 1 tablet (20 mg total) by mouth daily, Disp: 90 tablet, Rfl: 3    vitamin E 100 UNIT capsule, Take 1 capsule by mouth daily, Disp: , Rfl:     liraglutide (SAXENDA) injection, Inject 0.1 mL (0.6 mg total) under the skin daily for 7 days, THEN 0.2 mL (1.2 mg total) daily for 7 days, THEN 0.3 mL (1.8 mg total) daily for 7 days, THEN 0.4 mL (2.4 mg total) daily for 7 days, THEN 0.5 mL (3 mg total) daily for 28 days., Disp: 15 mL, Rfl: 1    Current Facility-Administered Medications:     EPINEPHrine PF (ADRENALIN) 1 mg/mL injection 0.5 mg, 0.5 mg, Intramuscular, Once, Aida Urias MD    Allergies   Allergen Reactions    Erythromycin Shortness Of Breath     Reaction Date: 20Oct2011;     Cefprozil      Reaction Date: 20Oct2011;     Cephalosporins           There were no vitals filed for this visit.    Body mass index is 33.41 kg/m².  Wt Readings from Last 3 Encounters:   05/02/25 93.9 kg (207 lb)   03/10/25 92.3 kg (203 lb 6.4 oz)   02/18/25 89.4 kg (197 lb)       Objective:                    Left Hip Exam     Tenderness   The patient is experiencing tenderness in the lateral.    Range of Motion   Flexion:  70   External rotation:  20   Internal rotation: 10     Muscle Strength   Abduction: 5/5   Adduction: 5/5   Flexion: 5/5     Other   Erythema: absent  Sensation: normal  Pulse: present    Comments:  Calf is soft and non tender  NVI            Diagnostics, reviewed and taken today if performed as documented:    None performed      The attending physician has personally reviewed the pertinent films in PACS and interpretation is as follows:      Procedures, if performed today:    Large joint arthrocentesis: L greater trochanteric bursa  Universal Protocol:  Consent:  "Verbal consent obtained.  Risks and benefits: risks, benefits and alternatives were discussed  Consent given by: patient  Time out: Immediately prior to procedure a \"time out\" was called to verify the correct patient, procedure, equipment, support staff and site/side marked as required.  Timeout called at: 5/2/2025 9:07 AM.  Patient understanding: patient states understanding of the procedure being performed  Site marked: the operative site was marked  Patient identity confirmed: verbally with patient  Supporting Documentation  Indications: pain     Is this a Visco injection? NoProcedure Details  Location: hip - L greater trochanteric bursa  Preparation: Patient was prepped and draped in the usual sterile fashion  Needle size: 22 G  Ultrasound guidance: no  Approach: anterolateral  Medications administered: 2 mL methylPREDNISolone acetate 40 mg/mL; 1 mL bupivacaine 0.25 %; 30 mg ketorolac 30 mg/mL    Patient tolerance: patient tolerated the procedure well with no immediate complications  Dressing:  Sterile dressing applied          Portions of the record may have been created with voice recognition software.  Occasional wrong word or \"sound a like\" substitutions may have occurred due to the inherent limitations of voice recognition software.  Read the chart carefully and recognize, using context, where substitutions have occurred.  "

## 2025-05-12 ENCOUNTER — TELEPHONE (OUTPATIENT)
Age: 70
End: 2025-05-12

## 2025-05-12 NOTE — TELEPHONE ENCOUNTER
PA Zepbound 2.5 MG/0.5ML SUBMITTED    to [email or web address]     via    []CMM-KEY:    [x]Surescripts-Case ID # [id number]  []Availity-Auth ID #  NDC #    []Faxed to plan   []Other website    []Phone call Case ID #      []PA sent as URGENT    All office notes, labs and other pertaining documents and studies sent. Clinical questions answered. Awaiting determination from insurance company.     Turnaround time for your insurance to make a decision on your Prior Authorization can take 7-21 business days.

## 2025-05-12 NOTE — TELEPHONE ENCOUNTER
Message sent via [email or web address].     will send a script for Zepbound starting dose 2.5 mg weekly x 4 weeks  Similar to Wegovy the dose is increased every 4 weeks trying to achieve one of the higher doses. For now I would continue with Saxenda.      Zepbound will need a pre authorization may take up to 2 weeks      It may or may not be covered       Dr Vazquez     Last read by Lili M Mortimer at 1:48PM on 5/12/2025.    Have you gotten a response from [employer] about covering Zepbound

## 2025-05-13 DIAGNOSIS — G47.33 OSA ON CPAP: ICD-10-CM

## 2025-05-13 DIAGNOSIS — E66.9 OBESITY (BMI 30-39.9): ICD-10-CM

## 2025-05-13 RX ORDER — TIRZEPATIDE 2.5 MG/.5ML
2.5 INJECTION, SOLUTION SUBCUTANEOUS WEEKLY
Qty: 2 ML | Refills: 0 | Status: SHIPPED | OUTPATIENT
Start: 2025-05-13 | End: 2025-06-10

## 2025-05-13 NOTE — TELEPHONE ENCOUNTER
PA for zepbound 2.5 mg/0.5 ml APPROVED     Date(s) approved May 12, 2025 to December 31, 2025     Case # [id number]     Patient advised by          [x]Lieferheldhart Message  []Phone call   [x]LMOM  []L/M to call office as no active Communication consent on file  []Unable to leave detailed message as VM not approved on Communication consent       Pharmacy advised by    [x]Fax  []Phone call  []Secure Chat    Specialty Pharmacy    []     Approval letter scanned into Media Yes

## 2025-06-05 ENCOUNTER — PATIENT MESSAGE (OUTPATIENT)
Dept: FAMILY MEDICINE CLINIC | Facility: CLINIC | Age: 70
End: 2025-06-05

## 2025-06-06 ENCOUNTER — TELEPHONE (OUTPATIENT)
Dept: FAMILY MEDICINE CLINIC | Facility: CLINIC | Age: 70
End: 2025-06-06

## 2025-06-06 DIAGNOSIS — G47.33 OSA ON CPAP: Primary | ICD-10-CM

## 2025-06-06 DIAGNOSIS — E66.01 OBESITY, MORBID (HCC): ICD-10-CM

## 2025-06-06 RX ORDER — TIRZEPATIDE 5 MG/.5ML
5 INJECTION, SOLUTION SUBCUTANEOUS WEEKLY
Qty: 2 ML | Refills: 0 | Status: SHIPPED | OUTPATIENT
Start: 2025-06-06

## 2025-06-06 NOTE — TELEPHONE ENCOUNTER
Patient called the RX Refill Line. Message is being forwarded to the office.     Patient is requesting the next dose up for zepbound.  She is currently on 2.5mg and is tolerating the med well.  If a dose increase is appropriate, please send to [facility]    She has 0 pens left and she is due for her next injection on 06/12/2025    Please contact patient at [phone] with any questions

## 2025-06-10 ENCOUNTER — TELEPHONE (OUTPATIENT)
Age: 70
End: 2025-06-10

## 2025-06-10 DIAGNOSIS — E78.2 MIXED HYPERLIPIDEMIA: Primary | ICD-10-CM

## 2025-06-10 DIAGNOSIS — K76.0 HEPATIC STEATOSIS: ICD-10-CM

## 2025-06-10 DIAGNOSIS — E03.9 ACQUIRED HYPOTHYROIDISM: ICD-10-CM

## 2025-06-10 DIAGNOSIS — R73.03 PREDIABETES: ICD-10-CM

## 2025-06-10 NOTE — TELEPHONE ENCOUNTER
Caller: Patient    Doctor: Dr. Bauer / Usman     Reason for call: Patient is calling to request FL hip injection,  last received on 1/17/25; please advise.     Call back#: [phone]

## 2025-06-10 NOTE — TELEPHONE ENCOUNTER
Spoke with patient, FL injection order was placed at last office visit. Patient given central scheduling phone number and was advised to give us a call back if needing a sooner appointment

## 2025-06-12 ENCOUNTER — TELEPHONE (OUTPATIENT)
Dept: RADIOLOGY | Facility: HOSPITAL | Age: 70
End: 2025-06-12

## 2025-06-12 NOTE — NURSING NOTE
Call placed to pt to discuss upcoming appointment at [facility].  No answer.  Information left on pts voicemail.  Pt is having a L Hip Non-Arthrogram completed on 6/20/2025.  Pre procedure instructions given including diet and taking own medications.  Instructed pt that she may eat normally and take medications as usual before the procedure.  Reminded pt of the location, date and time of procedure.  Phone number given for pt to call with any questions.

## 2025-06-14 ENCOUNTER — RESULTS FOLLOW-UP (OUTPATIENT)
Dept: FAMILY MEDICINE CLINIC | Facility: CLINIC | Age: 70
End: 2025-06-14

## 2025-06-14 DIAGNOSIS — E87.8 LOW BICARBONATE LEVEL: Primary | ICD-10-CM

## 2025-06-14 LAB
ALBUMIN SERPL-MCNC: 4.3 G/DL (ref 3.9–4.9)
ALP SERPL-CCNC: 98 IU/L (ref 44–121)
ALT SERPL-CCNC: 17 IU/L (ref 0–32)
AST SERPL-CCNC: 19 IU/L (ref 0–40)
BASOPHILS # BLD AUTO: 0.1 X10E3/UL (ref 0–0.2)
BASOPHILS NFR BLD AUTO: 1 %
BILIRUB SERPL-MCNC: 1 MG/DL (ref 0–1.2)
BUN SERPL-MCNC: 16 MG/DL (ref 8–27)
BUN/CREAT SERPL: 25 (ref 12–28)
CALCIUM SERPL-MCNC: 9.4 MG/DL (ref 8.7–10.3)
CHLORIDE SERPL-SCNC: 103 MMOL/L (ref 96–106)
CHOLEST SERPL-MCNC: 159 MG/DL (ref 100–199)
CHOLEST/HDLC SERPL: 2.1 RATIO (ref 0–4.4)
CO2 SERPL-SCNC: 19 MMOL/L (ref 20–29)
CREAT SERPL-MCNC: 0.63 MG/DL (ref 0.57–1)
EGFR: 97 ML/MIN/1.73
EOSINOPHIL # BLD AUTO: 0.3 X10E3/UL (ref 0–0.4)
EOSINOPHIL NFR BLD AUTO: 3 %
ERYTHROCYTE [DISTWIDTH] IN BLOOD BY AUTOMATED COUNT: 11.8 % (ref 11.7–15.4)
EST. AVERAGE GLUCOSE BLD GHB EST-MCNC: 111 MG/DL
GLOBULIN SER-MCNC: 2.4 G/DL (ref 1.5–4.5)
GLUCOSE SERPL-MCNC: 92 MG/DL (ref 70–99)
HBA1C MFR BLD: 5.5 % (ref 4.8–5.6)
HCT VFR BLD AUTO: 38.8 % (ref 34–46.6)
HDLC SERPL-MCNC: 76 MG/DL
HGB BLD-MCNC: 12.5 G/DL (ref 11.1–15.9)
IMM GRANULOCYTES # BLD: 0 X10E3/UL (ref 0–0.1)
IMM GRANULOCYTES NFR BLD: 0 %
LDLC SERPL CALC-MCNC: 70 MG/DL (ref 0–99)
LYMPHOCYTES # BLD AUTO: 1.9 X10E3/UL (ref 0.7–3.1)
LYMPHOCYTES NFR BLD AUTO: 20 %
MCH RBC QN AUTO: 30.3 PG (ref 26.6–33)
MCHC RBC AUTO-ENTMCNC: 32.2 G/DL (ref 31.5–35.7)
MCV RBC AUTO: 94 FL (ref 79–97)
MONOCYTES # BLD AUTO: 1 X10E3/UL (ref 0.1–0.9)
MONOCYTES NFR BLD AUTO: 10 %
NEUTROPHILS # BLD AUTO: 6.5 X10E3/UL (ref 1.4–7)
NEUTROPHILS NFR BLD AUTO: 66 %
PLATELET # BLD AUTO: 176 X10E3/UL (ref 150–450)
POTASSIUM SERPL-SCNC: 4.5 MMOL/L (ref 3.5–5.2)
PROT SERPL-MCNC: 6.7 G/DL (ref 6–8.5)
RBC # BLD AUTO: 4.12 X10E6/UL (ref 3.77–5.28)
SL AMB VLDL CHOLESTEROL CALC: 13 MG/DL (ref 5–40)
SODIUM SERPL-SCNC: 140 MMOL/L (ref 134–144)
TRIGL SERPL-MCNC: 69 MG/DL (ref 0–149)
TSH SERPL DL<=0.005 MIU/L-ACNC: 0.83 UIU/ML (ref 0.45–4.5)
WBC # BLD AUTO: 9.8 X10E3/UL (ref 3.4–10.8)

## 2025-06-20 ENCOUNTER — HOSPITAL ENCOUNTER (OUTPATIENT)
Dept: RADIOLOGY | Facility: HOSPITAL | Age: 70
Discharge: HOME | End: 2025-06-20
Attending: ORTHOPAEDIC SURGERY | Admitting: RADIOLOGY
Payer: COMMERCIAL

## 2025-06-20 DIAGNOSIS — M16.12 PRIMARY OSTEOARTHRITIS OF ONE HIP, LEFT: ICD-10-CM

## 2025-06-20 PROCEDURE — 20610 DRAIN/INJ JOINT/BURSA W/O US: CPT

## 2025-06-20 PROCEDURE — 77002 NEEDLE LOCALIZATION BY XRAY: CPT

## 2025-06-20 RX ORDER — LIDOCAINE HYDROCHLORIDE 10 MG/ML
10 INJECTION, SOLUTION EPIDURAL; INFILTRATION; INTRACAUDAL; PERINEURAL ONCE
Status: COMPLETED | OUTPATIENT
Start: 2025-06-20 | End: 2025-06-20

## 2025-06-20 RX ORDER — METHYLPREDNISOLONE ACETATE 80 MG/ML
80 INJECTION, SUSPENSION INTRA-ARTICULAR; INTRALESIONAL; INTRAMUSCULAR; SOFT TISSUE ONCE
Status: COMPLETED | OUTPATIENT
Start: 2025-06-20 | End: 2025-06-20

## 2025-06-20 RX ORDER — ROPIVACAINE HYDROCHLORIDE 2 MG/ML
10 INJECTION, SOLUTION EPIDURAL; INFILTRATION; PERINEURAL ONCE
Status: COMPLETED | OUTPATIENT
Start: 2025-06-20 | End: 2025-06-20

## 2025-06-20 RX ADMIN — LIDOCAINE HYDROCHLORIDE 3 ML: 10 INJECTION, SOLUTION EPIDURAL; INFILTRATION; INTRACAUDAL at 13:30

## 2025-06-20 RX ADMIN — IOHEXOL 2 ML: 300 INJECTION, SOLUTION INTRAVENOUS at 13:30

## 2025-06-20 RX ADMIN — METHYLPREDNISOLONE ACETATE 80 MG: 80 INJECTION, SUSPENSION INTRA-ARTICULAR; INTRALESIONAL; INTRAMUSCULAR; SOFT TISSUE at 13:30

## 2025-06-20 RX ADMIN — ROPIVACAINE HYDROCHLORIDE 2 ML: 2 INJECTION, SOLUTION EPIDURAL; INFILTRATION at 13:30

## 2025-06-28 LAB
BUN SERPL-MCNC: 18 MG/DL (ref 8–27)
BUN/CREAT SERPL: 24 (ref 12–28)
CALCIUM SERPL-MCNC: 10.1 MG/DL (ref 8.7–10.3)
CHLORIDE SERPL-SCNC: 103 MMOL/L (ref 96–106)
CO2 SERPL-SCNC: 22 MMOL/L (ref 20–29)
CREAT SERPL-MCNC: 0.74 MG/DL (ref 0.57–1)
EGFR: 88 ML/MIN/1.73
GLUCOSE SERPL-MCNC: 86 MG/DL (ref 70–99)
POTASSIUM SERPL-SCNC: 4.8 MMOL/L (ref 3.5–5.2)
SODIUM SERPL-SCNC: 141 MMOL/L (ref 134–144)

## 2025-06-30 ENCOUNTER — RESULTS FOLLOW-UP (OUTPATIENT)
Dept: FAMILY MEDICINE CLINIC | Facility: CLINIC | Age: 70
End: 2025-06-30

## 2025-07-02 NOTE — PATIENT COMMUNICATION
Patient calling to request the next dosage of zepbound (7.5m).  Please send to the [facility] pharmacy, may need new prior authorization

## 2025-07-06 DIAGNOSIS — G47.33 OSA ON CPAP: Primary | ICD-10-CM

## 2025-07-06 DIAGNOSIS — E66.01 OBESITY, MORBID (HCC): ICD-10-CM

## 2025-07-06 RX ORDER — TIRZEPATIDE 7.5 MG/.5ML
7.5 INJECTION, SOLUTION SUBCUTANEOUS WEEKLY
Qty: 2 ML | Refills: 0 | Status: SHIPPED | OUTPATIENT
Start: 2025-07-06

## 2025-07-22 DIAGNOSIS — E66.01 OBESITY, MORBID (HCC): ICD-10-CM

## 2025-07-22 DIAGNOSIS — G47.33 OSA ON CPAP: ICD-10-CM

## 2025-07-23 RX ORDER — TIRZEPATIDE 7.5 MG/.5ML
7.5 INJECTION, SOLUTION SUBCUTANEOUS WEEKLY
Qty: 2 ML | Refills: 0 | OUTPATIENT
Start: 2025-07-23

## 2025-07-25 DIAGNOSIS — G47.33 OSA ON CPAP: Primary | ICD-10-CM

## 2025-07-25 RX ORDER — TIRZEPATIDE 10 MG/.5ML
10 INJECTION, SOLUTION SUBCUTANEOUS WEEKLY
Qty: 2 ML | Refills: 0 | Status: SHIPPED | OUTPATIENT
Start: 2025-07-25

## 2025-08-03 ENCOUNTER — OFFICE VISIT (OUTPATIENT)
Dept: URGENT CARE | Age: 70
End: 2025-08-03
Payer: COMMERCIAL

## 2025-08-03 VITALS
HEART RATE: 108 BPM | RESPIRATION RATE: 18 BRPM | DIASTOLIC BLOOD PRESSURE: 75 MMHG | SYSTOLIC BLOOD PRESSURE: 113 MMHG | TEMPERATURE: 97.6 F | OXYGEN SATURATION: 99 %

## 2025-08-03 DIAGNOSIS — L50.9 URTICARIA: Primary | ICD-10-CM

## 2025-08-03 PROCEDURE — 99212 OFFICE O/P EST SF 10 MIN: CPT | Performed by: NURSE PRACTITIONER

## 2025-08-03 PROCEDURE — S9083 URGENT CARE CENTER GLOBAL: HCPCS | Performed by: NURSE PRACTITIONER

## 2025-08-03 PROCEDURE — 96372 THER/PROPH/DIAG INJ SC/IM: CPT | Performed by: NURSE PRACTITIONER

## 2025-08-03 RX ORDER — DIPHENHYDRAMINE HCL 12.5 MG/5ML
25 SOLUTION ORAL ONCE
Status: COMPLETED | OUTPATIENT
Start: 2025-08-03 | End: 2025-08-03

## 2025-08-03 RX ORDER — PREDNISONE 20 MG/1
TABLET ORAL
Qty: 23 TABLET | Refills: 0 | Status: SHIPPED | OUTPATIENT
Start: 2025-08-03 | End: 2025-08-18

## 2025-08-03 RX ORDER — DEXAMETHASONE SODIUM PHOSPHATE 10 MG/ML
10 INJECTION, SOLUTION INTRAMUSCULAR; INTRAVENOUS ONCE
Status: COMPLETED | OUTPATIENT
Start: 2025-08-03 | End: 2025-08-03

## 2025-08-03 RX ADMIN — DEXAMETHASONE SODIUM PHOSPHATE 10 MG: 10 INJECTION, SOLUTION INTRAMUSCULAR; INTRAVENOUS at 19:53

## 2025-08-03 RX ADMIN — DIPHENHYDRAMINE HCL 25 MG: 12.5 SOLUTION ORAL at 19:53

## 2025-08-04 ENCOUNTER — OFFICE VISIT (OUTPATIENT)
Dept: FAMILY MEDICINE CLINIC | Facility: CLINIC | Age: 70
End: 2025-08-04
Payer: COMMERCIAL

## 2025-08-04 VITALS
DIASTOLIC BLOOD PRESSURE: 64 MMHG | WEIGHT: 209 LBS | BODY MASS INDEX: 33.59 KG/M2 | HEIGHT: 66 IN | TEMPERATURE: 97.8 F | RESPIRATION RATE: 18 BRPM | SYSTOLIC BLOOD PRESSURE: 106 MMHG | OXYGEN SATURATION: 97 % | HEART RATE: 91 BPM

## 2025-08-04 DIAGNOSIS — L50.9 URTICARIA: Primary | ICD-10-CM

## 2025-08-04 DIAGNOSIS — E66.01 OBESITY, MORBID (HCC): ICD-10-CM

## 2025-08-04 PROCEDURE — G2211 COMPLEX E/M VISIT ADD ON: HCPCS

## 2025-08-04 PROCEDURE — 99214 OFFICE O/P EST MOD 30 MIN: CPT

## 2025-08-04 RX ORDER — EPINEPHRINE 0.3 MG/.3ML
0.3 INJECTION SUBCUTANEOUS ONCE
Qty: 2 EACH | Refills: 1 | Status: SHIPPED | OUTPATIENT
Start: 2025-08-04 | End: 2025-08-04

## 2025-08-05 ENCOUNTER — OFFICE VISIT (OUTPATIENT)
Dept: FAMILY MEDICINE CLINIC | Facility: CLINIC | Age: 70
End: 2025-08-05
Payer: COMMERCIAL

## 2025-08-05 VITALS
HEIGHT: 66 IN | RESPIRATION RATE: 18 BRPM | BODY MASS INDEX: 33.59 KG/M2 | WEIGHT: 209 LBS | TEMPERATURE: 98.6 F | SYSTOLIC BLOOD PRESSURE: 128 MMHG | DIASTOLIC BLOOD PRESSURE: 78 MMHG | OXYGEN SATURATION: 100 % | HEART RATE: 68 BPM

## 2025-08-05 DIAGNOSIS — L50.9 URTICARIA: Primary | ICD-10-CM

## 2025-08-05 PROCEDURE — 99214 OFFICE O/P EST MOD 30 MIN: CPT

## 2025-08-05 PROCEDURE — G2211 COMPLEX E/M VISIT ADD ON: HCPCS

## 2025-08-08 ENCOUNTER — OFFICE VISIT (OUTPATIENT)
Dept: OBGYN CLINIC | Facility: CLINIC | Age: 70
End: 2025-08-08
Payer: COMMERCIAL

## 2025-08-08 VITALS — BODY MASS INDEX: 33.43 KG/M2 | HEIGHT: 66 IN | WEIGHT: 208 LBS

## 2025-08-08 DIAGNOSIS — M70.62 GREATER TROCHANTERIC BURSITIS OF LEFT HIP: Primary | ICD-10-CM

## 2025-08-08 DIAGNOSIS — M16.12 PRIMARY OSTEOARTHRITIS OF ONE HIP, LEFT: ICD-10-CM

## 2025-08-08 PROCEDURE — 99213 OFFICE O/P EST LOW 20 MIN: CPT | Performed by: ORTHOPAEDIC SURGERY

## 2025-08-14 ENCOUNTER — TELEPHONE (OUTPATIENT)
Dept: OBGYN CLINIC | Facility: CLINIC | Age: 70
End: 2025-08-14